# Patient Record
Sex: MALE | Race: WHITE
[De-identification: names, ages, dates, MRNs, and addresses within clinical notes are randomized per-mention and may not be internally consistent; named-entity substitution may affect disease eponyms.]

---

## 2021-01-14 ENCOUNTER — HOSPITAL ENCOUNTER (EMERGENCY)
Dept: HOSPITAL 47 - EC | Age: 41
Discharge: HOME | End: 2021-01-14
Payer: COMMERCIAL

## 2021-01-14 VITALS
RESPIRATION RATE: 22 BRPM | DIASTOLIC BLOOD PRESSURE: 92 MMHG | HEART RATE: 74 BPM | TEMPERATURE: 98.6 F | SYSTOLIC BLOOD PRESSURE: 142 MMHG

## 2021-01-14 DIAGNOSIS — Z88.5: ICD-10-CM

## 2021-01-14 DIAGNOSIS — M25.511: Primary | ICD-10-CM

## 2021-01-14 PROCEDURE — 73030 X-RAY EXAM OF SHOULDER: CPT

## 2021-01-14 PROCEDURE — 99283 EMERGENCY DEPT VISIT LOW MDM: CPT

## 2021-01-14 PROCEDURE — 96372 THER/PROPH/DIAG INJ SC/IM: CPT

## 2021-01-14 NOTE — ED
Upper Extremity HPI





- General


Chief Complaint: Extremity Injury, Upper


Stated Complaint: Rt shoulder pain


Time Seen by Provider: 01/14/21 02:35


Source: patient, family


Mode of arrival: ambulatory


Limitations: no limitations





- History of Present Illness


Initial Comments: 


40 year-old male patient presents to the emergency department for evaluation of 

increased right shoulder pain. Patient states he had a dislocation on 1/3/21 and

has been having pain since. He states that tonight while at work he suddenly 

started having worse pain. States that he has been wearing the sling and not 

using it much. Denies numbness or tingling to the hand. He denies any new 

injury. States that he has not been able to get his pain medications filled due 

to his working hours. Has not been able to follow up with orthopedics because he

is waiting for a referral.  Patient denies any headache, neck pain, back pain, 

chest pain, shortness of breath, dizziness, weakness, abdominal pain, nausea, 

vomiting, or difficulties with bowel movements or urination.





- Related Data


                                Home Medications











 Medication  Instructions  Recorded  Confirmed


 


Acetaminophen Tab [Tylenol Tab] 1,500 mg PO Q6HR PRN 11/25/20 11/25/20


 


Dm/Acetaminophen/Doxylamine [Vicks 30 ml PO Q8H PRN 11/25/20 11/25/20





Nyquil Cold-Flu Liquid]   








                                  Previous Rx's











 Medication  Instructions  Recorded


 


Ondansetron Odt [Zofran Odt] 4 mg PO Q8HR PRN #20 tab 11/25/20











                                    Allergies











Allergy/AdvReac Type Severity Reaction Status Date / Time


 


hydromorphone [From Dilaudid] Allergy  Rash/Hives Verified 01/14/21 02:29


 


meperidine [From Demerol] Allergy  Rash/Hives Verified 01/14/21 02:29














Review of Systems


ROS Statement: 


Those systems with pertinent positive or pertinent negative responses have been 

documented in the HPI.





ROS Other: All systems not noted in ROS Statement are negative.





Past Medical History


Additional Past Medical History / Comment(s): multiple dislocation of rt 

shoulder


History of Any Multi-Drug Resistant Organisms: None Reported


Past Surgical History: Orthopedic Surgery


Additional Past Surgical History / Comment(s): rt shoulder


Past Psychological History: No Psychological Hx Reported


Smoking Status: Never smoker


Past Alcohol Use History: None Reported


Past Drug Use History: None Reported





General Exam


Limitations: no limitations


General appearance: alert, in no apparent distress, other (This is a well-

developed, well-nourished adult male patient in no acute distress.  Vital signs 

upon presentation are temperature 98.6F, pulse 74, respirations 22, blood 

pressure 142/92, pulse ox 97% on room air.)


Respiratory exam: Present: normal lung sounds bilaterally.  Absent: respiratory 

distress, wheezes, rales, rhonchi, stridor


Cardiovascular Exam: Present: regular rate, normal rhythm, normal heart sounds. 

Absent: systolic murmur, diastolic murmur, rubs, gallop, clicks


Extremities exam: Present: normal inspection, tenderness (Right shoulder), 

normal capillary refill, other (Skin to the right arm is pink, warm, dry.  Cap 

refills less than 3 seconds.  Radial pulses 2)).  Absent: full ROM (decreased 

due to pain), pedal edema, joint swelling, calf tenderness


Neurological exam: Present: alert, oriented X3, CN II-XII intact


Psychiatric exam: Present: normal affect, normal mood


Skin exam: Present: warm, dry, intact, normal color.  Absent: rash





Course


                                   Vital Signs











  01/14/21





  02:25


 


Temperature 98.6 F


 


Pulse Rate 74


 


Respiratory 22





Rate 


 


Blood Pressure 142/92


 


O2 Sat by Pulse 97





Oximetry 














Medical Decision Making





- Medical Decision Making


40-year-old male patient presents to the emergency department today for 

evaluation of pain to the right shoulder.  Patient had a dislocation on 01/0

3/2021 and did have successful reduction.  Has not yet been able to follow-up 

with orthopedics due to referral issues also has not picked up his prescription 

from the pharmacy for pain.  Patient states his shoulder started to hurt worse 

tonight.  Physical examination is unremarkable.  He has good neurovascular 

status.  X-rays negative for acute abnormalities.  He will be discharged after 

receiving pain medication in the emergency department.  He is instructed to 

follow-up is as possible with orthopedics.  We did discuss range of motion of 

the shoulder while using the sling to prevent frozen shoulder.  Return 

parameters were discussed in detail.  He verbalizes understanding and agrees 

with this plan.








- Radiology Data


Radiology results: report reviewed, image reviewed


Two-view x-ray of the right shoulder is obtained.  Report was reviewed in its 

entirety.  Impression by Dr. Washington shows no acute osseous abnormalities.





Disposition


Clinical Impression: 


 Shoulder pain, right





Disposition: HOME SELF-CARE


Condition: Good


Instructions (If sedation given, give patient instructions):  Shoulder Pain (ED)


Additional Instructions: 


Take medications as directed.  Follow-up with your primary care physician and 

orthopedic specialist for further evaluation as soon as possible.  Return to the

emergency department for any new, worsening, or concerning symptoms.


Is patient prescribed a controlled substance at d/c from ED?: No


Referrals: 


Tyra Marvin MD [Primary Care Provider] - 1-2 days


Time of Disposition: 03:23

## 2021-01-14 NOTE — XR
EXAM:

  XR Right Shoulder Complete, 2 or More Views

 

CLINICAL HISTORY:

  ITS.REASON XR Reason: Right shoulder pain

 

TECHNIQUE:

  Two or more views of the right shoulder.

 

COMPARISON:

  

1/3/2021

 

FINDINGS:

  Bones/joints:  No acute fracture.  No dislocation.  Arthroplasty 

hardware.

  Soft tissues:  Unremarkable.

 

IMPRESSION:     

  No acute osseous abnormalities.

## 2021-04-15 ENCOUNTER — HOSPITAL ENCOUNTER (OUTPATIENT)
Dept: HOSPITAL 47 - RADNMMAIN | Age: 41
Discharge: HOME | End: 2021-04-15
Attending: FAMILY MEDICINE
Payer: COMMERCIAL

## 2021-04-15 DIAGNOSIS — R07.9: Primary | ICD-10-CM

## 2021-04-15 PROCEDURE — 93351 STRESS TTE COMPLETE: CPT

## 2021-04-15 NOTE — ECHOS
STRESS ECHOCARDIOGRAM



LUMASON:

N/A Vial



INDICATIONS:

Chest pain.



MEDICATIONS:



BASELINE HEART RATE:

89



BASELINE BLOOD PRESSURE:

130/73



MAXIMUM HEART RATE:

154



MAXIMUM BLOOD PRESSURE:

175/98



85% MPHR:

153



100% MPHR:

180



METS:

10



MAXIMUM STAGE REACHED:

IV



TOTAL EXERCISE TIME:

9 minutes



CLINICAL INFORMATION:

Baseline EKG shows sinus rhythm, normal axis, normal intervals.  Patient exercised on

Sarabjit protocol for a total of 9 minutes achieving 10 METs, 85% of predicted maximal

heart rate without chest pain or diagnostic ST-segment depression.



Baseline echo shows normal left ventricular size, wall motion and systolic function.

Postexercise, there is normal hyperdynamic response of all segments of myocardium

noted.



CONCLUSIONS:

1. Good exercise tolerance.

2. Negative stress test by EKG criteria.

3. Negative stress echo.





MMODL / IJN: [id number] / Job#: [id number]

## 2021-04-27 ENCOUNTER — HOSPITAL ENCOUNTER (OUTPATIENT)
Dept: HOSPITAL 47 - RADUSWWP | Age: 41
Discharge: HOME | End: 2021-04-27
Attending: FAMILY MEDICINE
Payer: COMMERCIAL

## 2021-04-27 DIAGNOSIS — K80.20: Primary | ICD-10-CM

## 2021-04-27 PROCEDURE — 76700 US EXAM ABDOM COMPLETE: CPT

## 2021-04-27 NOTE — US
EXAMINATION TYPE: US abdomen complete

 

DATE OF EXAM: 4/27/2021

 

COMPARISON: NONE

 

CLINICAL HISTORY: Hepatic colic w/o cholangitis K80.50. RUQ pain, nausea and bloating x 1 month

 

EXAM MEASUREMENTS:

 

Liver Length:  14.7 cm   

Gallbladder Wall:  0.3 cm   

CBD:  0.3 cm

Spleen:  11.1 cm   

Right Kidney:  9.9 x 6.1 x 4.8 cm 

Left Kidney:  10.6 x 5.7 x 5.6 cm   

 

 

 

Pancreas:  obscured by overlying midline bowel gas

Liver:  limited visualization, scanned intercostally  

Gallbladder:  1.8cm stone

**Evidence for sonographic Pa's sign:  no

CBD:  visualized portions wnl 

Spleen:  wnl   

Right Kidney:  wnl   

Left Kidney:  wnl   

Upper IVC:  limited visualization  

Abd Aorta:  prox portion obscured by overlying midline bowel gas, mid and distal appear wnl

 

 

 

The visualized liver is heterogeneously hyperechoic. Evaluation for focal masses suboptimal due to th
e heterogeneity.  The intrahepatic portion of the IVC and visualized mid and distal abdominal aorta a
re within normal limits.  There is large shadowing 1.8 cm calculus.  No pericholecystic fluid or abno
rmal gallbladder wall thickening. Common bile duct is within normal limits. Suboptimal evaluation of 
pancreas on initial images saved due to shadowing from overlying bowel gas.  The spleen is unremarkab
le.  Kidneys are symmetric and free of hydronephrosis.  No renal lesions are seen on images saved.

 

IMPRESSION: Large gallstone without secondary ultrasound evidence for acute cholecystitis. Heterogene
ous hyperechoic appearance of liver is likely on basis of diffuse fatty infiltration.

## 2021-05-04 ENCOUNTER — HOSPITAL ENCOUNTER (OUTPATIENT)
Dept: HOSPITAL 47 - RADNMMAIN | Age: 41
Discharge: HOME | End: 2021-05-04
Attending: FAMILY MEDICINE
Payer: COMMERCIAL

## 2021-05-04 DIAGNOSIS — K80.30: Primary | ICD-10-CM

## 2021-05-04 PROCEDURE — 78226 HEPATOBILIARY SYSTEM IMAGING: CPT

## 2021-05-04 NOTE — NM
EXAMINATION TYPE: NM hepatobiliary w EF

 

DATE OF EXAM: 5/4/2021

 

COMPARISON: Abdominal ultrasound 4/27/2021

 

HISTORY: Hepatic colic with cholangitis

 

TECHNIQUE: After the intravenous administration of 4.86 mCi Tc 99m Mebrofenin hepatobiliary scintigra
phy is performed.  Immediate images post injection.

 

FINDINGS: 

There is satisfactory initial accumulation of tracer by the liver.  The gallbladder is visualized wit
hin 6 minutes.  The small bowel activity is noted within extending minutes.  At one hour 8 ounces of 
oral ensure plus is given to mimic CCK and gallbladder ejection fraction is calculated at 75 %, in th
e normal range.  Therefore there is no scintigraphic evidence of cystic or common bile duct obstructi
on, no acute or chronic cholecystitis, and no evidence of biliary dyskinesia. 

 

IMPRESSION: Exam is within normal limits.

## 2021-05-18 ENCOUNTER — HOSPITAL ENCOUNTER (OUTPATIENT)
Dept: HOSPITAL 47 - LABPAT | Age: 41
Discharge: HOME | End: 2021-05-18
Attending: SURGERY
Payer: COMMERCIAL

## 2021-05-18 DIAGNOSIS — Z11.52: ICD-10-CM

## 2021-05-18 DIAGNOSIS — Z01.818: Primary | ICD-10-CM

## 2021-05-18 PROCEDURE — 93005 ELECTROCARDIOGRAM TRACING: CPT

## 2021-05-20 ENCOUNTER — HOSPITAL ENCOUNTER (EMERGENCY)
Dept: HOSPITAL 47 - EC | Age: 41
Discharge: HOME | End: 2021-05-20
Payer: COMMERCIAL

## 2021-05-20 VITALS — SYSTOLIC BLOOD PRESSURE: 116 MMHG | DIASTOLIC BLOOD PRESSURE: 87 MMHG | HEART RATE: 78 BPM

## 2021-05-20 VITALS — TEMPERATURE: 97.7 F | RESPIRATION RATE: 18 BRPM

## 2021-05-20 DIAGNOSIS — K80.20: Primary | ICD-10-CM

## 2021-05-20 DIAGNOSIS — Z88.5: ICD-10-CM

## 2021-05-20 LAB
ALBUMIN SERPL-MCNC: 4.4 G/DL (ref 3.5–5)
ALP SERPL-CCNC: 58 U/L (ref 38–126)
ALT SERPL-CCNC: 34 U/L (ref 4–49)
ANION GAP SERPL CALC-SCNC: 9 MMOL/L
AST SERPL-CCNC: 23 U/L (ref 17–59)
BASOPHILS # BLD AUTO: 0.1 K/UL (ref 0–0.2)
BASOPHILS NFR BLD AUTO: 1 %
BUN SERPL-SCNC: 14 MG/DL (ref 9–20)
CALCIUM SPEC-MCNC: 10 MG/DL (ref 8.4–10.2)
CHLORIDE SERPL-SCNC: 105 MMOL/L (ref 98–107)
CO2 SERPL-SCNC: 26 MMOL/L (ref 22–30)
EOSINOPHIL # BLD AUTO: 0.2 K/UL (ref 0–0.7)
EOSINOPHIL NFR BLD AUTO: 2 %
ERYTHROCYTE [DISTWIDTH] IN BLOOD BY AUTOMATED COUNT: 5.06 M/UL (ref 4.3–5.9)
ERYTHROCYTE [DISTWIDTH] IN BLOOD: 12.7 % (ref 11.5–15.5)
GLUCOSE SERPL-MCNC: 86 MG/DL (ref 74–99)
HCT VFR BLD AUTO: 45.5 % (ref 39–53)
HGB BLD-MCNC: 15.8 GM/DL (ref 13–17.5)
LIPASE SERPL-CCNC: 227 U/L (ref 23–300)
LYMPHOCYTES # SPEC AUTO: 2.4 K/UL (ref 1–4.8)
LYMPHOCYTES NFR SPEC AUTO: 27 %
MCH RBC QN AUTO: 31.2 PG (ref 25–35)
MCHC RBC AUTO-ENTMCNC: 34.6 G/DL (ref 31–37)
MCV RBC AUTO: 90 FL (ref 80–100)
MONOCYTES # BLD AUTO: 0.5 K/UL (ref 0–1)
MONOCYTES NFR BLD AUTO: 6 %
NEUTROPHILS # BLD AUTO: 5.4 K/UL (ref 1.3–7.7)
NEUTROPHILS NFR BLD AUTO: 62 %
PLATELET # BLD AUTO: 256 K/UL (ref 150–450)
POTASSIUM SERPL-SCNC: 4.7 MMOL/L (ref 3.5–5.1)
PROT SERPL-MCNC: 7.3 G/DL (ref 6.3–8.2)
SODIUM SERPL-SCNC: 140 MMOL/L (ref 137–145)
WBC # BLD AUTO: 8.7 K/UL (ref 3.8–10.6)

## 2021-05-20 PROCEDURE — 93005 ELECTROCARDIOGRAM TRACING: CPT

## 2021-05-20 PROCEDURE — 36415 COLL VENOUS BLD VENIPUNCTURE: CPT

## 2021-05-20 PROCEDURE — 80053 COMPREHEN METABOLIC PANEL: CPT

## 2021-05-20 PROCEDURE — 96374 THER/PROPH/DIAG INJ IV PUSH: CPT

## 2021-05-20 PROCEDURE — 85025 COMPLETE CBC W/AUTO DIFF WBC: CPT

## 2021-05-20 PROCEDURE — 99284 EMERGENCY DEPT VISIT MOD MDM: CPT

## 2021-05-20 PROCEDURE — 83690 ASSAY OF LIPASE: CPT

## 2021-05-20 NOTE — ED
Medical Decision Making





- Lab Data


Result diagrams: 


                                 05/20/21 13:29





                                 05/20/21 13:29





                                   Lab Results











  05/20/21 05/20/21 Range/Units





  13:29 13:29 


 


WBC  8.7   (3.8-10.6)  k/uL


 


RBC  5.06   (4.30-5.90)  m/uL


 


Hgb  15.8   (13.0-17.5)  gm/dL


 


Hct  45.5   (39.0-53.0)  %


 


MCV  90.0   (80.0-100.0)  fL


 


MCH  31.2   (25.0-35.0)  pg


 


MCHC  34.6   (31.0-37.0)  g/dL


 


RDW  12.7   (11.5-15.5)  %


 


Plt Count  256   (150-450)  k/uL


 


MPV  7.4   


 


Neutrophils %  62   %


 


Lymphocytes %  27   %


 


Monocytes %  6   %


 


Eosinophils %  2   %


 


Basophils %  1   %


 


Neutrophils #  5.4   (1.3-7.7)  k/uL


 


Lymphocytes #  2.4   (1.0-4.8)  k/uL


 


Monocytes #  0.5   (0-1.0)  k/uL


 


Eosinophils #  0.2   (0-0.7)  k/uL


 


Basophils #  0.1   (0-0.2)  k/uL


 


Sodium   140  (137-145)  mmol/L


 


Potassium   4.7  (3.5-5.1)  mmol/L


 


Chloride   105  ()  mmol/L


 


Carbon Dioxide   26  (22-30)  mmol/L


 


Anion Gap   9  mmol/L


 


BUN   14  (9-20)  mg/dL


 


Creatinine   0.80  (0.66-1.25)  mg/dL


 


Est GFR (CKD-EPI)AfAm   >90  (>60 ml/min/1.73 sqM)  


 


Est GFR (CKD-EPI)NonAf   >90  (>60 ml/min/1.73 sqM)  


 


Glucose   86  (74-99)  mg/dL


 


Calcium   10.0  (8.4-10.2)  mg/dL


 


Total Bilirubin   0.2  (0.2-1.3)  mg/dL


 


AST   23  (17-59)  U/L


 


ALT   34  (4-49)  U/L


 


Alkaline Phosphatase   58  ()  U/L


 


Total Protein   7.3  (6.3-8.2)  g/dL


 


Albumin   4.4  (3.5-5.0)  g/dL


 


Lipase   227  ()  U/L














Disposition


Clinical Impression: 


 Cholelithiasis





Disposition: HOME SELF-CARE


Condition: Good


Instructions (If sedation given, give patient instructions):  Gallstones (ED)


Is patient prescribed a controlled substance at d/c from ED?: No


Referrals: 


Tyra Marvin MD [Primary Care Provider] - 1-2 days

## 2021-05-20 NOTE — ED
General Adult HPI





- General


Chief complaint: Abdominal Pain


Stated complaint: Chest Pain, Abd Pain


Time Seen by Provider: 05/20/21 13:14


Source: patient


Mode of arrival: ambulatory


Limitations: no limitations





- History of Present Illness


Initial comments: 


Dictation was produced using dragon dictation software. please excuse any 

grammatical, word or spelling errors. 





This patient was cared for during a federal and state declared state of emergenc

y secondary to Covid 19





Chief Complaint: 40-year-old male with past medical history of gallstone 

presents with gallstone pain.





History of Present Illness: It is a 40-year-old male he was recently diagnosed 

with a large gallstone.  5 days he is scheduled to have a laparoscopic 

cholecystectomy performed by Dr. Jenkins.  Patient for the last 2-3 days has been 

having increasing pain to his right upper quadrant.  His pain rates up into his 

chest.  Call his primary care doctor and PCP told to come to the emergency 

department.  Patient denies any fever.  He had some eggs prior to the onset of 

his symptoms.  Denies any chills or any other constitutional symptoms.








The ROS documented in this emergency department record has been reviewed and 

confirmed by me.  Those systems with pertinent positive or negative responses 

have been documented in the HPI.  All other systems are other negative and/or 

noncontributory.








PHYSICAL EXAM:


General Impression: Alert and oriented x3, not in acute distress


HEENT: Normocephalic atraumatic, extra-ocular movements intact, pupils equal and

reactive to light bilaterally, mucous membranes moist.


Cardiovascular: Heart regular rate and rhythm


Chest: Able to complete full sentences, no retractions, no tachypnea


Abdomen: abdomen soft, non-tender, non-distended, no organomegaly, negative 

Pa sign


Musculoskeletal: Pulses present and equal in all extremities, no peripheral 

edema


Motor:  no focal deficits noted


Neurological: CN II-XII grossly intact, no focal motor or sensory deficits noted


Skin: Intact with no visualized rashes


Psych: Normal affect and mood





ED course: 40-year-old male presents to emergency department for symptomatic 

cholelithiasis.  Vital signs upon arrival are within acceptable limits.


Laboratory evaluation obtained showing no acute processes.  No concern for acute

cholecystitis or choledocholithiasis..  Patient appears improved.  Patient will 

be discharged.  Clinical presentation consistent with symptomatic 

cholelithiasis.








- Related Data


                                Home Medications











 Medication  Instructions  Recorded  Confirmed


 


Acetaminophen Tab [Tylenol Tab] 1,500 mg PO Q6HR PRN 11/25/20 11/25/20


 


Dm/Acetaminophen/Doxylamine [Vicks 30 ml PO Q8H PRN 11/25/20 11/25/20





Nyquil Cold-Flu Liquid]   








                                  Previous Rx's











 Medication  Instructions  Recorded


 


Ondansetron Odt [Zofran Odt] 4 mg PO Q8HR PRN #20 tab 11/25/20











                                    Allergies











Allergy/AdvReac Type Severity Reaction Status Date / Time


 


hydromorphone [From Dilaudid] Allergy  Rash/Hives Verified 05/20/21 12:03


 


meperidine [From Demerol] Allergy  Rash/Hives Verified 05/20/21 12:03














Review of Systems


ROS Statement: 


Those systems with pertinent positive or pertinent negative responses have been 

documented in the HPI.





ROS Other: All systems not noted in ROS Statement are negative.





Past Medical History


Additional Past Medical History / Comment(s): multiple dislocation of rt tamia

ulder


History of Any Multi-Drug Resistant Organisms: None Reported


Past Surgical History: Orthopedic Surgery


Additional Past Surgical History / Comment(s): rt shoulder


Past Psychological History: No Psychological Hx Reported


Smoking Status: Never smoker


Past Alcohol Use History: None Reported


Past Drug Use History: None Reported





General Exam


Limitations: no limitations





Course


                                   Vital Signs











  05/20/21





  12:00


 


Temperature 97.7 F


 


Pulse Rate 92


 


Respiratory 18





Rate 


 


Blood Pressure 137/100


 


O2 Sat by Pulse 95





Oximetry 














Medical Decision Making





- Lab Data


Result diagrams: 


                                 05/20/21 13:29





                                 05/20/21 13:29


                                   Lab Results











  05/20/21 05/20/21 Range/Units





  13:29 13:29 


 


WBC  8.7   (3.8-10.6)  k/uL


 


RBC  5.06   (4.30-5.90)  m/uL


 


Hgb  15.8   (13.0-17.5)  gm/dL


 


Hct  45.5   (39.0-53.0)  %


 


MCV  90.0   (80.0-100.0)  fL


 


MCH  31.2   (25.0-35.0)  pg


 


MCHC  34.6   (31.0-37.0)  g/dL


 


RDW  12.7   (11.5-15.5)  %


 


Plt Count  256   (150-450)  k/uL


 


MPV  7.4   


 


Neutrophils %  62   %


 


Lymphocytes %  27   %


 


Monocytes %  6   %


 


Eosinophils %  2   %


 


Basophils %  1   %


 


Neutrophils #  5.4   (1.3-7.7)  k/uL


 


Lymphocytes #  2.4   (1.0-4.8)  k/uL


 


Monocytes #  0.5   (0-1.0)  k/uL


 


Eosinophils #  0.2   (0-0.7)  k/uL


 


Basophils #  0.1   (0-0.2)  k/uL


 


Sodium   140  (137-145)  mmol/L


 


Potassium   4.7  (3.5-5.1)  mmol/L


 


Chloride   105  ()  mmol/L


 


Carbon Dioxide   26  (22-30)  mmol/L


 


Anion Gap   9  mmol/L


 


BUN   14  (9-20)  mg/dL


 


Creatinine   0.80  (0.66-1.25)  mg/dL


 


Est GFR (CKD-EPI)AfAm   >90  (>60 ml/min/1.73 sqM)  


 


Est GFR (CKD-EPI)NonAf   >90  (>60 ml/min/1.73 sqM)  


 


Glucose   86  (74-99)  mg/dL


 


Calcium   10.0  (8.4-10.2)  mg/dL


 


Total Bilirubin   0.2  (0.2-1.3)  mg/dL


 


AST   23  (17-59)  U/L


 


ALT   34  (4-49)  U/L


 


Alkaline Phosphatase   58  ()  U/L


 


Total Protein   7.3  (6.3-8.2)  g/dL


 


Albumin   4.4  (3.5-5.0)  g/dL


 


Lipase   227  ()  U/L














Disposition


Clinical Impression: 


 Cholelithiasis





Disposition: HOME SELF-CARE


Condition: Good


Is patient prescribed a controlled substance at d/c from ED?: No


Referrals: 


Tyra Marvin MD [Primary Care Provider] - 1-2 days


Time of Disposition: 14:28

## 2021-05-25 ENCOUNTER — HOSPITAL ENCOUNTER (OUTPATIENT)
Dept: HOSPITAL 47 - OR | Age: 41
Discharge: HOME | End: 2021-05-25
Attending: SURGERY
Payer: COMMERCIAL

## 2021-05-25 VITALS — RESPIRATION RATE: 16 BRPM

## 2021-05-25 VITALS — HEART RATE: 72 BPM | DIASTOLIC BLOOD PRESSURE: 79 MMHG | SYSTOLIC BLOOD PRESSURE: 136 MMHG

## 2021-05-25 VITALS — BODY MASS INDEX: 36.5 KG/M2

## 2021-05-25 VITALS — TEMPERATURE: 97.3 F

## 2021-05-25 DIAGNOSIS — K80.10: Primary | ICD-10-CM

## 2021-05-25 DIAGNOSIS — Z88.5: ICD-10-CM

## 2021-05-25 DIAGNOSIS — Z91.040: ICD-10-CM

## 2021-05-25 DIAGNOSIS — E78.00: ICD-10-CM

## 2021-05-25 PROCEDURE — 47562 LAPAROSCOPIC CHOLECYSTECTOMY: CPT

## 2021-05-25 PROCEDURE — 88304 TISSUE EXAM BY PATHOLOGIST: CPT

## 2021-05-25 RX ADMIN — ONDANSETRON ONE MG: 2 INJECTION INTRAMUSCULAR; INTRAVENOUS at 11:59

## 2021-05-25 RX ADMIN — FENTANYL CITRATE ONE MCG: 50 INJECTION, SOLUTION INTRAMUSCULAR; INTRAVENOUS at 14:17

## 2021-05-25 RX ADMIN — FENTANYL CITRATE ONE MCG: 50 INJECTION, SOLUTION INTRAMUSCULAR; INTRAVENOUS at 13:51

## 2021-05-25 RX ADMIN — FENTANYL CITRATE ONE MCG: 50 INJECTION, SOLUTION INTRAMUSCULAR; INTRAVENOUS at 14:10

## 2021-05-25 RX ADMIN — FENTANYL CITRATE ONE MCG: 50 INJECTION, SOLUTION INTRAMUSCULAR; INTRAVENOUS at 14:01

## 2021-05-25 RX ADMIN — ONDANSETRON ONE MG: 2 INJECTION INTRAMUSCULAR; INTRAVENOUS at 13:54

## 2021-05-25 NOTE — P.OP
Date of Procedure: 05/25/21


Preoperative Diagnosis: 


Symptomatic cholelithiasis


Postoperative Diagnosis: 


Symptomatic cholelithiasis


Procedure(s) Performed: 


Robotic cholecystectomy


Anesthesia: MEGAN


Surgeon: Elvia Jenkins


Pathology: other (Gallbladder and contents)


Condition: stable


Disposition: same day


Indications for Procedure: 


41-year-old male presented to the surgery clinic with complaints of right upper 

quadrant pain, on workup he was found to have cholelithiasis.  Based on clinical

picture, he was diagnosed with symptomatically cholelithiasis.  He has opted for

robotic cholecystectomy.  Risks, benefits and alternatives were provided to the 

patient.  He did provide consent prior to attending the operating suite.


Operative Findings: 


Cholelithiasis


Description of Procedure: 


The patient is brought into the operating suite and placed in supine position on

the operating table.  Sedation was provided by anesthesia and the patient 

underwent endotracheal intubation.  The patient was then prepped and draped in 

regular sterile fashion.  An infra umbilical incision was made dissection was 

carried to the fascia.  The fascia was incised in 8 mm trocar was placed.  

Pneumoperitoneum was achieved.  Once pneumoperitoneum was achieved, the patient 

was placed in appropriate position of reverse Trendelenburg and rotated to the 

left.  3 additional 8 mm trochars were placed.  2 were placed in the right lower

quadrant and one was placed in the left upper quadrant.  The robot was then 

docked.  The gallbladder was grasped and elevated and dissection was carried 

along the infundibulum, revealing both the cystic duct and cystic artery.  Both 

the cystic duct and cystic artery were skeletonized.  The cystic duct was 

clearly visualized using ICG technology.  2 clips were placed proximally and the

cystic duct and one was placed distally and the cystic duct was ligated.  2 cl

ips were placed proximally on the artery and 2 clips were placed distally and 

the cystic artery was ligated.  Hemostasis was noted to be maintained and 

cautery was used to dissect the gallbladder from the gallbladder fossa.  The 

gallbladder was then placed in an Endo Catch bag and removed from the abdomen.  

Copious muss of suction and irrigation were used in the right upper quadrant.  

Hemostasis was noted to be maintained.  At this point, the infraumbilical 

incision site fascia was closed using 0 Vicryl suture under direct visualization

using a Emilio-Jason device.  Pneumoperitoneum was then released and all 

ports removed from the abdomen.  All skin incisions were closed with 4-0 Vicryl 

subcuticular suture.  The patient was awakened in the operating suite and taken 

to postanesthesia care unit in stable condition.

## 2021-07-29 ENCOUNTER — HOSPITAL ENCOUNTER (EMERGENCY)
Dept: HOSPITAL 47 - EC | Age: 41
Discharge: HOME | End: 2021-07-29
Payer: COMMERCIAL

## 2021-07-29 VITALS — HEART RATE: 75 BPM | TEMPERATURE: 97.9 F

## 2021-07-29 VITALS — SYSTOLIC BLOOD PRESSURE: 124 MMHG | RESPIRATION RATE: 16 BRPM | DIASTOLIC BLOOD PRESSURE: 88 MMHG
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
## 2021-07-29 DIAGNOSIS — S39.012A: Primary | ICD-10-CM
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
## 2021-07-29 LAB — GLUCOSE BLD-MCNC: 97 MG/DL (ref 75–99)

## 2021-07-29 PROCEDURE — 72100 X-RAY EXAM L-S SPINE 2/3 VWS: CPT

## 2021-07-29 PROCEDURE — 96372 THER/PROPH/DIAG INJ SC/IM: CPT

## 2021-07-29 PROCEDURE — 36415 COLL VENOUS BLD VENIPUNCTURE: CPT

## 2021-07-29 PROCEDURE — 99283 EMERGENCY DEPT VISIT LOW MDM: CPT

## 2021-07-29 NOTE — XR
EXAMINATION TYPE: XR lumbar spine 2 or 3V

 

DATE OF EXAM: 7/29/2021

 

COMPARISON: NONE

 

HISTORY: Low back pain

 

TECHNIQUE: 3 views

 

FINDINGS: Lumbar vertebra have normal alignment. There is posterior fusion surgery at L5-S1 with disc
 prosthesis. There is no compression fracture. Sacroiliac joints are intact. I see no bony destructiv
e process.

 

IMPRESSION: Previous surgery. No acute abnormality of the lumbar spine. No fracture.

## 2021-07-29 NOTE — ED
Back Pain HPI





- General


Chief Complaint: Back Pain/Injury


Stated Complaint: Lower Back Pain


Time Seen by Provider: 07/29/21 04:46


Source: patient, RN notes reviewed, old records reviewed


Limitations: no limitations





- History of Present Illness


Initial Comments: 





41 male to the ER for evaluation of recurrent back injury.  Patient has some 

pain down his right leg.  History of back surgery and fusion.  Injury occurred 

at work with heavy lifting.  Patient is without fevers, bleeding he may be 

urinating more than normal.  No modifying factors for symptoms at home no loss 

of bowel or bladder.  Patient is able to amply without difficulty


MD Complaint: back pain, back injury


-: hour(s)


Similar Symptoms Previously: Yes


Place: home


Radiation: none


Severity: severe


Severity scale (1-10): 8


Quality: sharp


Consistency: constant


Improves With: none


Worsens With: none


Context: while lifting, turning/twisting


Associated Symptoms: denies other symptoms





- Related Data


                                  Previous Rx's











 Medication  Instructions  Recorded


 


HYDROcodone/APAP 5-325MG [Norco 1 tab PO Q6HR PRN 3 Days #12 tab 05/25/21





5-325]  


 


Ibuprofen [Motrin] 600 mg PO Q8HR PRN #24 tab 05/25/21











                                    Allergies











Allergy/AdvReac Type Severity Reaction Status Date / Time


 


hydromorphone [From Dilaudid] Allergy  Rash/Hives Verified 07/29/21 04:51


 


latex Allergy  Rash/Hives Verified 07/29/21 04:51


 


meperidine [From Demerol] Allergy  Rash/Hives Verified 07/29/21 04:51














Review of Systems


ROS Statement: 


Those systems with pertinent positive or pertinent negative responses have been 

documented in the HPI.





ROS Other: All systems not noted in ROS Statement are negative.





Past Medical History


Additional Past Medical History / Comment(s): multiple dislocation of rt 

shoulder, back pain


History of Any Multi-Drug Resistant Organisms: None Reported


Past Surgical History: Back Surgery


Additional Past Surgical History / Comment(s): rt shoulder


Past Psychological History: No Psychological Hx Reported


Smoking Status: Never smoker


Past Alcohol Use History: None Reported


Past Drug Use History: None Reported





General Exam


Limitations: no limitations


General appearance: alert, in no apparent distress


Head exam: Present: atraumatic, normocephalic, normal inspection


Eye exam: Present: normal appearance, PERRL, EOMI.  Absent: scleral icterus, 

conjunctival injection, periorbital swelling


ENT exam: Present: normal exam, mucous membranes moist


Neck exam: Present: normal inspection.  Absent: tenderness, meningismus, 

lymphadenopathy


Respiratory exam: Present: normal lung sounds bilaterally.  Absent: respiratory 

distress, wheezes, rales, rhonchi, stridor


Cardiovascular Exam: Present: regular rate, normal rhythm, normal heart sounds. 

Absent: systolic murmur, diastolic murmur, rubs, gallop, clicks


GI/Abdominal exam: Present: soft, normal bowel sounds.  Absent: distended, 

tenderness, guarding, rebound, rigid


Extremities exam: Present: normal inspection, full ROM, normal capillary refill.

 Absent: tenderness, pedal edema, joint swelling, calf tenderness


Back exam: Present: normal inspection


Neurological exam: Present: alert, oriented X3, CN II-XII intact


Psychiatric exam: Present: normal affect, normal mood


Skin exam: Present: warm, dry, intact, normal color.  Absent: rash





Course


                                   Vital Signs











  07/29/21





  04:46


 


Temperature 97.9 F


 


Pulse Rate 75


 


Respiratory 20





Rate 


 


Blood Pressure 151/91


 


O2 Sat by Pulse 100





Oximetry 














- Reevaluation(s)


Reevaluation #1: 





07/29/21 05:59


Medical record is reviewed


Reevaluation #2: 





07/29/21 05:59


Patient's back pain is improved


Reevaluation #3: 





07/29/21 06:00


Patient is informed of results and questions have been answered





Medical Decision Making





- Medical Decision Making





41 male to the ER  for evaluation of acute on chronic back pain.  Back pain 

improved currently, Motrin and Tylenol pain at home when he can be discharged 

home





- Lab Data


                                   Lab Results











  07/29/21 Range/Units





  05:36 


 


POC Glucose (mg/dL)  97  (75-99)  mg/dL


 


POC Glu Operater ID  Lexy Zabala  














- Radiology Data


Radiology results: report reviewed (X-ray lumbar spine is negative for traumatic

injury, prior surgery looks in place), image reviewed





Disposition


Clinical Impression: 


 Mechanical back pain, Strain of lumbar region, Sciatica, Lumbar radiculopathy





Disposition: HOME SELF-CARE


Condition: Good


Instructions (If sedation given, give patient instructions):  Acute Low Back 

Pain (ED)


Is patient prescribed a controlled substance at d/c from ED?: No


Referrals: 


Tyra Marvin MD [Primary Care Provider] - 1-2 days


VANE Julien DO [Doctor of Osteopathic Medicine] - 1-2 days

## 2021-08-01 ENCOUNTER — HOSPITAL ENCOUNTER (EMERGENCY)
Dept: HOSPITAL 47 - EC | Age: 41
Discharge: HOME | End: 2021-08-01
Payer: COMMERCIAL

## 2021-08-01 VITALS — DIASTOLIC BLOOD PRESSURE: 78 MMHG | SYSTOLIC BLOOD PRESSURE: 134 MMHG | HEART RATE: 82 BPM | RESPIRATION RATE: 16 BRPM

## 2021-08-01 VITALS — TEMPERATURE: 97.8 F
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
## 2021-08-01 DIAGNOSIS — S39.012A: Primary | ICD-10-CM

## 2021-08-01 PROCEDURE — 99283 EMERGENCY DEPT VISIT LOW MDM: CPT

## 2021-08-01 PROCEDURE — 93005 ELECTROCARDIOGRAM TRACING: CPT

## 2021-08-01 PROCEDURE — 72131 CT LUMBAR SPINE W/O DYE: CPT

## 2021-08-01 NOTE — ED
Back Pain HPI





- General


Chief Complaint: Back Pain/Injury


Stated Complaint: Revisit Back Pain, Diarrhea


Time Seen by Provider: 08/01/21 14:20


Source: patient


Limitations: no limitations





- History of Present Illness


Initial Comments: 





41-year-old male presents to the emergency department with a chief complaint of 

back pain.  He reports history of chronic back pain with fusion of L5 and S1.  

States he was here several days ago with the same back pain which she believes 

is secondary to strain while he is at work.  States the pain is located in the 

right lumbosacral region with radiation along the posterior aspect of her right 

lower extremity.  States his symptoms improved while he was here but then they 

began to return.  States is also developed one episode of diarrhea but no nausea

or vomiting or abdominal pain.  He denies any fevers or chills.  Denies any 

saddle anesthesia, urinary retention with overflow or bowel incontinence.





- Related Data


                                Home Medications











 Medication  Instructions  Recorded  Confirmed


 


Cyclobenzaprine [Flexeril] 10 mg PO BID PRN 08/01/21 08/01/21


 


dexAMETHasone [Dexamethasone] 4 mg PO BID 08/01/21 08/01/21











                                    Allergies











Allergy/AdvReac Type Severity Reaction Status Date / Time


 


hydromorphone [From Dilaudid] Allergy  Rash/Hives Verified 08/01/21 16:12


 


latex Allergy  Rash/Hives Verified 08/01/21 16:12


 


meperidine [From Demerol] Allergy  Rash/Hives Verified 08/01/21 16:12














Review of Systems


ROS Statement: 


Those systems with pertinent positive or pertinent negative responses have been 

documented in the HPI.





ROS Other: All systems not noted in ROS Statement are negative.





Past Medical History


Additional Past Medical History / Comment(s): multiple dislocation of rt 

shoulder, back pain


History of Any Multi-Drug Resistant Organisms: None Reported


Past Surgical History: Back Surgery


Additional Past Surgical History / Comment(s): rt shoulder


Past Psychological History: No Psychological Hx Reported


Smoking Status: Never smoker


Past Alcohol Use History: None Reported


Past Drug Use History: None Reported





General Exam


Limitations: no limitations


General appearance: alert, in no apparent distress


Head exam: Present: atraumatic, normocephalic, normal inspection


Eye exam: Present: normal appearance, PERRL, EOMI


Pupils: Present: normal accommodation


ENT exam: Present: normal exam, normal oropharynx, mucous membranes moist


Neck exam: Present: normal inspection, full ROM.  Absent: tenderness


Respiratory exam: Present: normal lung sounds bilaterally.  Absent: respiratory 

distress, wheezes, rales, rhonchi, stridor, chest wall tenderness, accessory 

muscle use


Cardiovascular Exam: Present: regular rate, normal rhythm, normal heart sounds. 

Absent: systolic murmur


Extremities exam: Present: normal inspection, full ROM, normal capillary refill.

 Absent: tenderness


Back exam: Present: normal inspection, full ROM, paraspinal tenderness (Right-

sided paraspinal tenderness in the lumbar sacral region.).  Absent: tenderness, 

CVA tenderness (R), CVA tenderness (L), muscle spasm


Neurological exam: Present: alert, oriented X3, normal gait


Psychiatric exam: Present: normal affect, normal mood


Skin exam: Present: warm, dry, intact, normal color





Course


                                   Vital Signs











  08/01/21 08/01/21 08/01/21





  14:13 15:24 16:49


 


Temperature 97.8 F  


 


Pulse Rate 92 83 82


 


Respiratory 16 18 16





Rate   


 


Blood Pressure 148/90 134/84 134/78


 


O2 Sat by Pulse 100 97 98





Oximetry   














Medical Decision Making





- Medical Decision Making





41-year-old male presents to the emergency department with a chief complaint of 

back pain.  On physical examination, right-sided paraspinal tenderness in the 

lumbosacral region with radiating to the right lower leg.  Likely sciatica type 

symptoms.  This is acute on chronic back pain.  No cauda equina.  Patient was 

given analgesia and a Lidoderm patch.  On Reevaluation, he reports improvement 

in symptoms.  CT imaging of the lumbar spine shows no acute findings.  I will 

discharge the patient with Tylenol 3 starter pack.  Advised to follow-up with an

orthopedic specialist.  Return parameters were thoroughly discussed patient is 

understanding and agreeable.  Case discussed with physician.





Disposition


Clinical Impression: 


 Mechanical back pain, Strain of lumbar region





Disposition: HOME SELF-CARE


Condition: Stable


Instructions (If sedation given, give patient instructions):  Acute Low Back 

Pain (ED)


Additional Instructions: 


Follow with orthopedic specialist.  Return to emergency department if symptoms 

worsen.


Is patient prescribed a controlled substance at d/c from ED?: No


Referrals: 


Tyra Marvin MD [Primary Care Provider] - 1-2 days


Rehan Bautista DO [Doctor of Osteopathic Medicine] - 1-2 days


Time of Disposition: 16:32

## 2021-08-01 NOTE — CT
EXAMINATION TYPE: CT lumbar spine wo con

 

DATE OF EXAM: 8/1/2021 3:10 PM

 

COMPARISON: Lumbar spine radiograph 7/29/2021

 

HISTORY: Low back pain, no injury. History of L5-S1 fusion.

 

CT DLP: 1625.6 mGycm

Automated exposure control for dose reduction was used.

 

TECHNIQUE: Unenhanced CT of the lumbar spine was performed.  Bone and soft tissue window settings are
 submitted as well as coronal and sagittal reconstructions. 

 

Postsurgical changes consistent with posterior fusion at L5-S1 with intervertebral disc spacer. Lumba
r vertebral body heights are maintained. Mild multilevel degenerative changes with anterior osteophyt
es and Schmorl's nodes most pronounced in the upper lumbar and lower thoracic spines.

 

L1-L2: Normal disc space height.  No disc herniation protrusion or central stenosis.  No facet joint 
arthropathy.  No evidence for foraminal encroachment.

 

L2-L3: Normal disc space height.  No disc herniation protrusion or central stenosis.  No facet joint 
arthropathy.  No evidence for foraminal encroachment.

 

L3-L4: Normal disc space height.  No disc herniation protrusion or central stenosis.  No facet joint 
arthropathy.  No evidence for foraminal encroachment.

 

L4-L5: Normal disc space height.  Mild disc bulge. No central stenosis.  No facet joint arthropathy. 
 No evidence for foraminal encroachment.

 

L5-S1: Intervertebral disc spacer is present. No disc herniation protrusion or central stenosis.  No 
facet joint arthropathy.  No evidence for foraminal encroachment.

 

IMPRESSION:

1. Posterior fusion L5-S1 with intervertebral disc spacer. Hardware appears intact.

2. No acute fracture or traumatic subluxation.

3. No spinal canal stenosis or neural foraminal narrowing.

## 2021-08-04 ENCOUNTER — HOSPITAL ENCOUNTER (INPATIENT)
Dept: HOSPITAL 47 - EC | Age: 41
LOS: 4 days | Discharge: HOME | DRG: 392 | End: 2021-08-08
Attending: INTERNAL MEDICINE | Admitting: INTERNAL MEDICINE
Payer: COMMERCIAL

## 2021-08-04 DIAGNOSIS — T38.0X5A: ICD-10-CM

## 2021-08-04 DIAGNOSIS — G89.29: ICD-10-CM

## 2021-08-04 DIAGNOSIS — F41.1: ICD-10-CM

## 2021-08-04 DIAGNOSIS — D72.829: ICD-10-CM

## 2021-08-04 DIAGNOSIS — K52.9: Primary | ICD-10-CM

## 2021-08-04 DIAGNOSIS — Z98.1: ICD-10-CM

## 2021-08-04 DIAGNOSIS — K44.9: ICD-10-CM

## 2021-08-04 DIAGNOSIS — M54.40: ICD-10-CM

## 2021-08-04 LAB
ALBUMIN SERPL-MCNC: 4.1 G/DL (ref 3.5–5)
ALP SERPL-CCNC: 59 U/L (ref 38–126)
ALT SERPL-CCNC: 36 U/L (ref 4–49)
ANION GAP SERPL CALC-SCNC: 9 MMOL/L
APTT BLD: 22.7 SEC (ref 22–30)
AST SERPL-CCNC: 30 U/L (ref 17–59)
BASOPHILS # BLD AUTO: 0.1 K/UL (ref 0–0.2)
BASOPHILS NFR BLD AUTO: 0 %
BUN SERPL-SCNC: 23 MG/DL (ref 9–20)
CALCIUM SPEC-MCNC: 9.5 MG/DL (ref 8.4–10.2)
CHLORIDE SERPL-SCNC: 103 MMOL/L (ref 98–107)
CO2 SERPL-SCNC: 22 MMOL/L (ref 22–30)
EOSINOPHIL # BLD AUTO: 0.1 K/UL (ref 0–0.7)
EOSINOPHIL NFR BLD AUTO: 0 %
ERYTHROCYTE [DISTWIDTH] IN BLOOD BY AUTOMATED COUNT: 5.1 M/UL (ref 4.3–5.9)
ERYTHROCYTE [DISTWIDTH] IN BLOOD: 14.2 % (ref 11.5–15.5)
GLUCOSE SERPL-MCNC: 121 MG/DL (ref 74–99)
HCT VFR BLD AUTO: 47.9 % (ref 39–53)
HGB BLD-MCNC: 16 GM/DL (ref 13–17.5)
INR PPP: 1 (ref ?–1.2)
LYMPHOCYTES # SPEC AUTO: 1.9 K/UL (ref 1–4.8)
LYMPHOCYTES NFR SPEC AUTO: 12 %
MAGNESIUM SPEC-SCNC: 2.2 MG/DL (ref 1.6–2.3)
MCH RBC QN AUTO: 31.4 PG (ref 25–35)
MCHC RBC AUTO-ENTMCNC: 33.4 G/DL (ref 31–37)
MCV RBC AUTO: 93.9 FL (ref 80–100)
MONOCYTES # BLD AUTO: 0.6 K/UL (ref 0–1)
MONOCYTES NFR BLD AUTO: 4 %
NEUTROPHILS # BLD AUTO: 12.9 K/UL (ref 1.3–7.7)
NEUTROPHILS NFR BLD AUTO: 83 %
PLATELET # BLD AUTO: 356 K/UL (ref 150–450)
POTASSIUM SERPL-SCNC: 4.8 MMOL/L (ref 3.5–5.1)
PROT SERPL-MCNC: 7 G/DL (ref 6.3–8.2)
PT BLD: 10.6 SEC (ref 9–12)
SODIUM SERPL-SCNC: 134 MMOL/L (ref 137–145)
WBC # BLD AUTO: 15.6 K/UL (ref 3.8–10.6)

## 2021-08-04 PROCEDURE — 84520 ASSAY OF UREA NITROGEN: CPT

## 2021-08-04 PROCEDURE — 36415 COLL VENOUS BLD VENIPUNCTURE: CPT

## 2021-08-04 PROCEDURE — 84484 ASSAY OF TROPONIN QUANT: CPT

## 2021-08-04 PROCEDURE — 71275 CT ANGIOGRAPHY CHEST: CPT

## 2021-08-04 PROCEDURE — 93005 ELECTROCARDIOGRAM TRACING: CPT

## 2021-08-04 PROCEDURE — 82565 ASSAY OF CREATININE: CPT

## 2021-08-04 PROCEDURE — 87046 STOOL CULTR AEROBIC BACT EA: CPT

## 2021-08-04 PROCEDURE — 85027 COMPLETE CBC AUTOMATED: CPT

## 2021-08-04 PROCEDURE — 83735 ASSAY OF MAGNESIUM: CPT

## 2021-08-04 PROCEDURE — 80053 COMPREHEN METABOLIC PANEL: CPT

## 2021-08-04 PROCEDURE — 85610 PROTHROMBIN TIME: CPT

## 2021-08-04 PROCEDURE — 85730 THROMBOPLASTIN TIME PARTIAL: CPT

## 2021-08-04 PROCEDURE — 87324 CLOSTRIDIUM AG IA: CPT

## 2021-08-04 PROCEDURE — 71046 X-RAY EXAM CHEST 2 VIEWS: CPT

## 2021-08-04 PROCEDURE — 85379 FIBRIN DEGRADATION QUANT: CPT

## 2021-08-04 PROCEDURE — 87045 FECES CULTURE AEROBIC BACT: CPT

## 2021-08-04 PROCEDURE — 80061 LIPID PANEL: CPT

## 2021-08-04 PROCEDURE — 85025 COMPLETE CBC W/AUTO DIFF WBC: CPT

## 2021-08-04 PROCEDURE — 81003 URINALYSIS AUTO W/O SCOPE: CPT

## 2021-08-04 PROCEDURE — 74177 CT ABD & PELVIS W/CONTRAST: CPT

## 2021-08-04 RX ADMIN — MORPHINE SULFATE PRN MG: 2 INJECTION, SOLUTION INTRAMUSCULAR; INTRAVENOUS at 22:14

## 2021-08-04 NOTE — ED
General Adult HPI





- General


Chief complaint: Chest Pain


Stated complaint: Chest Pain


Time Seen by Provider: 08/04/21 16:00


Source: patient, RN notes reviewed, old records reviewed


Mode of arrival: wheelchair


Limitations: no limitations





- History of Present Illness


Initial comments: 





This is a 41-year-old male presents emergency Department stating since this 

morning he's been having left-sided chest pain.  Patient states it feels like 

someone sitting on his chest per patient also states he is a little bit short of

breath.  Patient denies any diaphoretic episodes.  Patient states the pain is 

radiating down his left arm..  Patient states she's been dealing with pre-

significant lower back pain on the right side he is diagnosed with sciatica and 

that is also not allowing him to sleep very well but he is here today because of

the chest pain.  Patient states this pain is increased with movement and it is 

difficult to get a comfortable position.  Patient states chest pain however is 

not associated with any movement.  Patient denies being a diabetic and high 

blood pressure or high cholesterol.  Patient denies any smoking history patient 

is a family history of heart disease.  Patient denies abdominal pain patient 

denies nausea vomiting diarrhea.  Patient denies reproducible pain.  Patient 

states he had a job for about a month where he was doing a lot of heavy lifting 

because his back injury they let him go.  Patient denies headache patient denies

numbness weakness.





- Related Data


                                Home Medications











 Medication  Instructions  Recorded  Confirmed


 


Cyclobenzaprine [Flexeril] 10 mg PO BID PRN 08/01/21 08/04/21


 


dexAMETHasone [Dexamethasone] 4 mg PO BID 08/01/21 08/04/21











                                    Allergies











Allergy/AdvReac Type Severity Reaction Status Date / Time


 


hydromorphone [From Dilaudid] Allergy  Rash/Hives Verified 08/04/21 17:53


 


latex Allergy  Rash/Hives Verified 08/04/21 17:53


 


meperidine [From Demerol] Allergy  Rash/Hives Verified 08/04/21 17:53














Review of Systems


ROS Statement: 


Those systems with pertinent positive or pertinent negative responses have been 

documented in the HPI.





ROS Other: All systems not noted in ROS Statement are negative.





Past Medical History


Past Medical History: No Reported History


Additional Past Medical History / Comment(s): multiple dislocation of rt 

shoulder, back pain


History of Any Multi-Drug Resistant Organisms: None Reported


Past Surgical History: Back Surgery, Orthopedic Surgery


Additional Past Surgical History / Comment(s): rt shoulder


Past Psychological History: No Psychological Hx Reported


Smoking Status: Never smoker


Past Alcohol Use History: None Reported


Past Drug Use History: None Reported





General Exam





- General Exam Comments


Initial Comments: 





GENERAL:


Patient is well-developed and well-nourished.  Patient is nontoxic and well-

hydrated and is in mild distress.





ENT:


Neck is soft and supple.  No significant lymphadenopathy is noted.  Oropharynx 

is clear.  Moist mucous membranes.  Neck has full range of motion without 

eliciting any pain.  





EYES:


The sclera were anicteric and conjunctiva were pink and moist.  Extraocular 

movements were intact and pupils were equal round and reactive to light.  

Eyelids were unremarkable.





PULMONARY:


Unlabored respirations.  Good breath sounds bilaterally.  No audible rales 

rhonchi or wheezing was noted.





CARDIOVASCULAR:


There is a regular rate and rhythm without any murmurs gallops or rubs.  





ABDOMEN:


Soft and nontender with normal bowel sounds.  





SKIN:


Skin is clear with no lesions or rashes and otherwise unremarkable.





NEUROLOGIC:


Patient is alert and oriented x3.  Cranial nerves II through XII are grossly 

intact.  Motor and sensory are also intact.  Normal speech, volume and content. 

Symmetrical smile.  





MUSCULOSKELETAL:


Normal extremities with adequate strength and full range of motion.  No lower 

extremity swelling or edema.  No calf tenderness.





LYMPHATICS:


No significant lymphadenopathy is noted





PSYCHIATRIC:


Normal psychiatric evaluation.  


Limitations: no limitations





Course


                                   Vital Signs











  08/04/21 08/04/21





  15:57 18:49


 


Temperature 98.0 F 


 


Pulse Rate 94 86


 


Respiratory 20 16





Rate  


 


Blood Pressure 123/84 120/80


 


O2 Sat by Pulse 96 99





Oximetry  














Medical Decision Making





- Medical Decision Making





EKG shows normal sinus rhythm at 86 bpm UT interval is 172 QRS is 74 QT interval

340 QTC is 406 per patient's EKG shows no ST segment elevation or depression.





Chest x-ray shows no acute abnormality.





I will begin the room to reevaluate the patient he continued to state that he 

was having both lower back pain and chest pain in the chest pain was radiating 

down his left arm.  Patient appeared to be very anxious I gave him an Ativan as 

well as Toradol this point time.





I spoke with Dr. Wheat he agreed to admit the patient admitted the patient 

wrote admitting orders.





- Lab Data


Result diagrams: 


                                 08/04/21 17:14





                                 08/04/21 17:14


                                   Lab Results











  08/04/21 08/04/21 08/04/21 Range/Units





  17:14 17:14 17:14 


 


WBC  15.6 H    (3.8-10.6)  k/uL


 


RBC  5.10    (4.30-5.90)  m/uL


 


Hgb  16.0    (13.0-17.5)  gm/dL


 


Hct  47.9    (39.0-53.0)  %


 


MCV  93.9    (80.0-100.0)  fL


 


MCH  31.4    (25.0-35.0)  pg


 


MCHC  33.4    (31.0-37.0)  g/dL


 


RDW  14.2    (11.5-15.5)  %


 


Plt Count  356    (150-450)  k/uL


 


MPV  7.0    


 


Neutrophils %  83    %


 


Lymphocytes %  12    %


 


Monocytes %  4    %


 


Eosinophils %  0    %


 


Basophils %  0    %


 


Neutrophils #  12.9 H    (1.3-7.7)  k/uL


 


Lymphocytes #  1.9    (1.0-4.8)  k/uL


 


Monocytes #  0.6    (0-1.0)  k/uL


 


Eosinophils #  0.1    (0-0.7)  k/uL


 


Basophils #  0.1    (0-0.2)  k/uL


 


PT   10.6   (9.0-12.0)  sec


 


INR   1.0   (<1.2)  


 


APTT   22.7   (22.0-30.0)  sec


 


Sodium    134 L  (137-145)  mmol/L


 


Potassium    4.8  (3.5-5.1)  mmol/L


 


Chloride    103  ()  mmol/L


 


Carbon Dioxide    22  (22-30)  mmol/L


 


Anion Gap    9  mmol/L


 


BUN    23 H  (9-20)  mg/dL


 


Creatinine    0.79  (0.66-1.25)  mg/dL


 


Est GFR (CKD-EPI)AfAm    >90  (>60 ml/min/1.73 sqM)  


 


Est GFR (CKD-EPI)NonAf    >90  (>60 ml/min/1.73 sqM)  


 


Glucose    121 H  (74-99)  mg/dL


 


Calcium    9.5  (8.4-10.2)  mg/dL


 


Magnesium    2.2  (1.6-2.3)  mg/dL


 


Total Bilirubin    0.3  (0.2-1.3)  mg/dL


 


AST    30  (17-59)  U/L


 


ALT    36  (4-49)  U/L


 


Alkaline Phosphatase    59  ()  U/L


 


Troponin I     (0.000-0.034)  ng/mL


 


Total Protein    7.0  (6.3-8.2)  g/dL


 


Albumin    4.1  (3.5-5.0)  g/dL














  08/04/21 Range/Units





  17:14 


 


WBC   (3.8-10.6)  k/uL


 


RBC   (4.30-5.90)  m/uL


 


Hgb   (13.0-17.5)  gm/dL


 


Hct   (39.0-53.0)  %


 


MCV   (80.0-100.0)  fL


 


MCH   (25.0-35.0)  pg


 


MCHC   (31.0-37.0)  g/dL


 


RDW   (11.5-15.5)  %


 


Plt Count   (150-450)  k/uL


 


MPV   


 


Neutrophils %   %


 


Lymphocytes %   %


 


Monocytes %   %


 


Eosinophils %   %


 


Basophils %   %


 


Neutrophils #   (1.3-7.7)  k/uL


 


Lymphocytes #   (1.0-4.8)  k/uL


 


Monocytes #   (0-1.0)  k/uL


 


Eosinophils #   (0-0.7)  k/uL


 


Basophils #   (0-0.2)  k/uL


 


PT   (9.0-12.0)  sec


 


INR   (<1.2)  


 


APTT   (22.0-30.0)  sec


 


Sodium   (137-145)  mmol/L


 


Potassium   (3.5-5.1)  mmol/L


 


Chloride   ()  mmol/L


 


Carbon Dioxide   (22-30)  mmol/L


 


Anion Gap   mmol/L


 


BUN   (9-20)  mg/dL


 


Creatinine   (0.66-1.25)  mg/dL


 


Est GFR (CKD-EPI)AfAm   (>60 ml/min/1.73 sqM)  


 


Est GFR (CKD-EPI)NonAf   (>60 ml/min/1.73 sqM)  


 


Glucose   (74-99)  mg/dL


 


Calcium   (8.4-10.2)  mg/dL


 


Magnesium   (1.6-2.3)  mg/dL


 


Total Bilirubin   (0.2-1.3)  mg/dL


 


AST   (17-59)  U/L


 


ALT   (4-49)  U/L


 


Alkaline Phosphatase   ()  U/L


 


Troponin I  <0.012  (0.000-0.034)  ng/mL


 


Total Protein   (6.3-8.2)  g/dL


 


Albumin   (3.5-5.0)  g/dL














Disposition


Clinical Impression: 


 Chest pain, Sciatica





Disposition: ADMITTED AS IP TO THIS HOSP


Referrals: 


Tyra Marvin MD [Primary Care Provider] - 1-2 days


Time of Disposition: 19:20

## 2021-08-04 NOTE — XR
EXAMINATION TYPE: XR chest 2V

 

DATE OF EXAM: 8/4/2021

 

COMPARISON: NONE

 

HISTORY: Chest pain

 

TECHNIQUE:

 

FINDINGS: Heart and mediastinum are normal. There is some small linear density in the left midlung an
d apparent left lung surgery. There is no evidence of a pulmonary mass. There is no pleural effusion.
 There are no hilar masses. There is right shoulder prosthesis.

 

IMPRESSION: No active cardiopulmonary disease. There is some minimal scarring or subsegmental atelect
asis in the left lung without change compared to chest CT scan of 11/25/2020.

## 2021-08-05 LAB
CHOLEST SERPL-MCNC: 185 MG/DL (ref 0–200)
HDLC SERPL-MCNC: 58 MG/DL (ref 40–60)
LDLC SERPL CALC-MCNC: 88.4 MG/DL (ref 0–131)
TRIGL SERPL-MCNC: 193 MG/DL (ref 0–149)
VLDLC SERPL CALC-MCNC: 38.6 MG/DL (ref 5–40)

## 2021-08-05 RX ADMIN — METRONIDAZOLE SCH MLS/HR: 500 INJECTION, SOLUTION INTRAVENOUS at 16:55

## 2021-08-05 RX ADMIN — KETOROLAC TROMETHAMINE SCH MG: 15 INJECTION, SOLUTION INTRAMUSCULAR; INTRAVENOUS at 17:53

## 2021-08-05 RX ADMIN — NITROGLYCERIN SCH INCH: 20 OINTMENT TOPICAL at 01:46

## 2021-08-05 RX ADMIN — MORPHINE SULFATE PRN MG: 4 INJECTION, SOLUTION INTRAMUSCULAR; INTRAVENOUS at 23:35

## 2021-08-05 RX ADMIN — MORPHINE SULFATE PRN MG: 4 INJECTION, SOLUTION INTRAMUSCULAR; INTRAVENOUS at 18:33

## 2021-08-05 RX ADMIN — METRONIDAZOLE SCH MLS/HR: 500 INJECTION, SOLUTION INTRAVENOUS at 12:40

## 2021-08-05 RX ADMIN — METRONIDAZOLE SCH MLS/HR: 500 INJECTION, SOLUTION INTRAVENOUS at 23:31

## 2021-08-05 RX ADMIN — KETOROLAC TROMETHAMINE SCH MG: 15 INJECTION, SOLUTION INTRAMUSCULAR; INTRAVENOUS at 23:31

## 2021-08-05 RX ADMIN — MORPHINE SULFATE PRN MG: 4 INJECTION, SOLUTION INTRAMUSCULAR; INTRAVENOUS at 05:04

## 2021-08-05 RX ADMIN — MORPHINE SULFATE PRN MG: 4 INJECTION, SOLUTION INTRAMUSCULAR; INTRAVENOUS at 09:07

## 2021-08-05 RX ADMIN — MORPHINE SULFATE PRN MG: 4 INJECTION, SOLUTION INTRAMUSCULAR; INTRAVENOUS at 13:46

## 2021-08-05 RX ADMIN — MORPHINE SULFATE PRN MG: 2 INJECTION, SOLUTION INTRAMUSCULAR; INTRAVENOUS at 02:27

## 2021-08-05 RX ADMIN — KETOROLAC TROMETHAMINE SCH MG: 15 INJECTION, SOLUTION INTRAMUSCULAR; INTRAVENOUS at 12:38

## 2021-08-05 RX ADMIN — CYCLOBENZAPRINE HYDROCHLORIDE PRN MG: 10 TABLET, FILM COATED ORAL at 16:55

## 2021-08-05 RX ADMIN — NITROGLYCERIN SCH: 20 OINTMENT TOPICAL at 08:24

## 2021-08-05 RX ADMIN — HYDROCODONE BITARTRATE AND ACETAMINOPHEN PRN EACH: 5; 325 TABLET ORAL at 20:42

## 2021-08-05 RX ADMIN — LEVOFLOXACIN SCH MLS/HR: 5 INJECTION, SOLUTION INTRAVENOUS at 11:13

## 2021-08-05 NOTE — CONS
CONSULTATION



ATTENDING PHYSICIAN:

Dr. Marvin.



HISTORY OF PRESENT ILLNESS:

Mr. Hoffmann is a 41-year-old male with no cardiac history who presented with chest

heaviness on the left side.  The discomfort has been going on throughout the day

yesterday, respirophasic in pattern and positional.  He has a history of back

discomfort and prior fusion.  He has underwent a stress echocardiogram in April of this

year that revealed no evidence of inducible ischemia with preserved left ventricular

size and systolic function.  The patient's activity is limited at times because of his

back.  Because of the persistent symptoms, he came into the emergency room.  He

continues to have discomfort in the chest.  The discomfort continues to have the same

pattern, respirophasic and positional.  He has some tingling in the left arm.  He has

no prior history of myocardial infarction, angina pectoris, or documented congestive

heart failure.  He has no clear PND, orthopnea, or peripheral edema.



His coronary risk factors negative for smoking or diabetes mellitus.  He is not

hypertensive.  He has no history of documented hyperlipidemia.



MEDICATION:

His medications at home include: Dexamethasone and Flexeril.



REVIEW OF SYSTEMS:

RESPIRATORY system: He has no documented history of asthma, emphysema or bronchitis.

GI system: No recent GI bleeding.  No peptic ulcer disease.

 system: No dysuria or hematuria.

NERVOUS SYSTEM: No stroke or seizure.



PHYSICAL EXAMINATION:

He is a 41-year-old male, alert, oriented, in no apparent distress.

Blood pressure 115/80 with a heart rate in the 70s.

HEAD:  Normocephalic.  Eyes sclerae anicteric.

NECK: Good carotid upstroke.  No bruit. No jugular venous distention.

LUNGS:  Clear to auscultation.

HEART: Regular rate and rhythm S1, S2.  No S3.  No S4.  No murmur or rub.

ABDOMEN:  Soft, nontender.  Positive bowel sounds. No megaly.

EXTREMITIES:  No edema.  Intact distal pulses.

Chest discomfort reproducible by palpation and change with position as well as deep

breathing.



LAB DATA:

Lab data revealed troponin less than 0.012 for 3 samples.  BUN and creatinine 23 and

0.79.  Hemoglobin of 16, white blood cell of 15.6.  EKG revealed a sinus mechanism,

normal axis and intervals, QS in the 3 and AVF that was noted in the past, unchanged

with RS prime.  His CT angiogram of the chest that revealed no evidence of pulmonary

embolism.



IMPRESSION:

1. Chest discomfort, has atypical features for ischemic heart disease probably

    noncardiac with recent stress echocardiogram that revealed no evidence of inducible

    ischemia.

2. Musculoskeletal chest discomfort.



RECOMMENDATIONS:

From the cardiac standpoint, I see no evidence to suggest acute coronary syndrome in

view of the most recent stress test.  I see no indication to undergo any further

cardiac workup at this time.  We will see him on as-needed basis.  Please feel free to

call us for any questions.





MMODL / IJN: [id number] / Job#: [id number]

## 2021-08-05 NOTE — CT
EXAMINATION TYPE: CT angio chest

 

DATE OF EXAM: 8/5/2021

 

COMPARISON: 7/25/2020

 

HISTORY: Chest pain

 

CT DLP: 595.3 mGycm

Automated exposure control for dose reduction was used.

 

CONTRAST: 

Performed with IV Contrast, patient injected with 100 mL of Isovue 370.

 

There are 3-D post processed images.

 

The lungs are clear of consolidation. There is some mild atelectasis left posterior lung base. There 
is no pleural effusion. There is no pericardial effusion. Heart size is normal. There is no mediastin
al adenopathy. There are no hilar masses. Thoracic aorta is intact. There is no evidence of aneurysm 
or dissection.

 

There is no evidence of filling defect in the pulmonary arteries.

 

The thoracic spine is intact. Sternum is intact.

 

There is small hiatal hernia.

 

IMPRESSION:

No evidence of pulmonary embolism. There is some mild scarring and atelectasis left lung base similar
 to old exam. No suspicious pulmonary mass. Normal heart. Retinoid Dermatitis Normal Treatment: I recommended more frequent application of Cetaphil or CeraVe to the areas of dermatitis.

## 2021-08-05 NOTE — P.HPIM
History of Present Illness


H&P Date: 21





HISTORY OF PRESENT ILLNESS


This is a 41-year-old male patient of Dr. Marvin with past medical history of 

chronic back pain with lumbar fusion 4 years ago.  He was seen in the ER on  and  for back pain and diarrhea.  On , back x-ray showed no 

acute abnormalities of the lumbar spine.  CAT scan of the lumbar spine on  revealed posterior fusion L5-S1 with intravertebral disc spacer.  Hardware 

appears intact.  No acute fracture or traumatic subluxation.  No spinal canal 

stenosis or neural foraminal narrowing.  Patient was given Tylenol 3 and dex

amethasone.  Patient now presented on  due to chest pain that was on the

left side radiating to his left shoulder and arm as well as left upper quadrant 

bulge.  Patient states he cannot get his left arm to relax.  He contacted Dr. Marvin and had an EKG done that showed changes and patient was sent into the ER. 

He states he was up crying all night due to pain.  Regarding diarrhea, he has ha

d this for 7 days.  He denies any blood in his stools.  He denies any sick 

contacts he states that every time he eats or drinks something or goes right 

through him.  He does relate to being under a lot of stress.  Patient did 

undergo stress echocardiogram in April of this year which revealed no evidence 

of inducible ischemia with preserved left ventricular size and systolic 

function.





Patient presented to [facility] emergency center.  He was 

afebrile, heart rate in the 80s and 90s, blood pressure 123/84, pulse ox 96% on 

room air.  EKG was a sinus rhythm.  WBC 15.6, hemoglobin 16, platelet count 356.

 Sodium 134, potassium 4.8, chloride 103, CO2 22, BUN 23 and creatinine 0.79.  

Blood sugar 121.  Magnesium 2.2, liver function tests were normal.  Troponins 

negative on 3 draws.  Triglycerides 193, cholesterol 185, LDL 88, HDL 58.


Chest x-ray showed no cardiopulmonary disease.


CTA of the chest was negative for pulmonary embolism.  Mild scarring and 

atelectasis in the left lung base similar to old exam.  No suspicious pulmonary 

mass.  Normal heart.


Patient has been seen by cardiology and was atypical chest pain for heart 

disease, while musculoskeletal chest pain and cardiology has signed off.


Due to patient's left upper quadrant tenderness and ongoing diarrhea for 1 week,

CT of the abdomen and pelvis ordered which is delayed until tomorrow due to 

earlier contrast.  Patient started on IV antibiotics for suspected colitis 





REVIEW OF SYSTEMS


Constitutional: No fever, no chills, no night sweats.  No weight change.  No 

weakness, fatigue or lethargy.  No daytime sleepiness.


EENT: No headache.  No blurred vision or double vision, no loss of vision.  No 

loss of Hearing, no ringing in the ears, no dizziness.  No nasal drainage or 

congestion.  No epistaxis.  No sore throat.


Lungs: No shortness of breath, cough, no sputum production.  No wheezing.


Cardiovascular: Reports chest pain, no lower extremity edema.  No palpitations. 

No paroxysmal nocturnal dyspnea.  No orthopnea.  No lightheadedness or 

dizziness.  No syncopal episodes.


Abdominal: Reports abdominal pain.  No nausea, vomiting.  Reports diarrhea.  No 

constipation.  No bloody or tarry stools. Reports loss of appetite.


Genitourinary: No dysuria, increased frequency, urgency.  No urinary retention.


Musculoskeletal: No myalgias.  No muscle weakness, no gait dysfunction, no frequ

ent falls.  No back pain.  No neck pain.


Integumentary: No wounds, no lesions.  No rash or pruritus.  No unusual 

bruising.  No change in hair or nails.


Neurologic: No aphasia. No facial droop. No change in mentation. No head injury.

No headache. No paralysis. No paresthesia.


Psychiatric: No depression.  Reports anxiety.  No mood swings.


Endocrine: No abnormal blood sugars.  No weight change.  No excessive sweating 

or thirst.  No cold intolerance.  





SOCIAL HISTORY


Patient is a lifelong nonsmoker, no illicit drug use, marijuana use or alcohol 

use.  Patient is  and lives alone.  





FAMILY HISTORY


Mother  at age 59 from lupus.  Patient does not know his father.  Patient 

has one brother with autoimmune disorder.  Patient does not have any sisters.  





PHYSICAL EXAMINATION


Gen: This brodie 41-year-old obese male seen sitting on the ER stretcher, eating 

eggs but states he does not have any appetite.


EENT: Head is atraumatic, normocephalic. Pupils equal, round. Sclerae is 

anicteric. 


NECK: Supple. No JVD. No lymphadenopathy. No thyromegaly. 


LUNGS: Clear to auscultation. No wheezes or rhonchi.  No intercostal 

retractions.


HEART: Regular rate and rhythm. No murmur. 


ABDOMEN: Soft. Bowel sounds are present. No masses. Low left rib and left upper 

quadrant tenderness.


EXTREMITIES: No pedal edema.  No calf tenderness.


NEUROLOGICAL: Patient is awake, alert and oriented x3. Cranial nerves 2 through 

12 are grossly intact. 





ASSESSMENT AND PLAN


1. left upper quadrant pain and tenderness with diarrhea, possible colitis.  

Patient started on Levaquin 500 mg IV piggyback daily and Flagyl 500 mg every 8 

hours, continue morphine as needed for pain, Zofran as needed for nausea, CT of 

the abdomen and pelvis with contrast tomorrow.  Toradol 15 mg IV push every 6 

hours as needed for pain control.





2.  Chest pain, musculoskeletal.  Cardiology consult appreciated.  Cardiology 

has signed off.





3.  Chronic low back pain with previous lumbar fusion.  Continue Flexeril 10 mg 

twice daily as needed.





4.  Leukocytosis most likely secondary to steroid use.  Repeat CBC tomorrow.





5.  Generalized anxiety disorder.





6.  GI prophylaxis.  Protonix 40 mg IV push daily.





7.  DVT prophylaxis.  Lovenox daily.





Patient will be admitted to the hospital for a minimum of 2 night stay.





DISCHARGE PLAN


Home.





Impression and plan of care have been directed as dictated by the signing 

physician.  Daya Rubio nurse practitioner acting as scribe for signing 

physician.





Past Medical History


Past Medical History: No Reported History


Additional Past Medical History / Comment(s): multiple dislocation of rt 

shoulder, back pain


History of Any Multi-Drug Resistant Organisms: None Reported


Past Surgical History: Back Surgery, Orthopedic Surgery


Additional Past Surgical History / Comment(s): rt shoulder


Past Psychological History: No Psychological Hx Reported


Smoking Status: Never smoker


Past Alcohol Use History: None Reported


Past Drug Use History: None Reported





Medications and Allergies


                                Home Medications











 Medication  Instructions  Recorded  Confirmed  Type


 


Cyclobenzaprine [Flexeril] 10 mg PO BID PRN 21 History


 


dexAMETHasone [Dexamethasone] 4 mg PO BID 21 History








                                    Allergies











Allergy/AdvReac Type Severity Reaction Status Date / Time


 


hydromorphone [From Dilaudid] Allergy  Rash/Hives Verified 21 17:53


 


latex Allergy  Rash/Hives Verified 21 17:53


 


meperidine [From Demerol] Allergy  Rash/Hives Verified 21 17:53














Physical Exam


Vitals: 


                                   Vital Signs











  Temp Pulse Resp BP Pulse Ox


 


 21 09:09   73  18  115/85  98


 


 21 06:28  96.9 F L  60  16  122/73  96


 


 21 05:03   65  16  131/87  95


 


 21 01:49   71  16  112/80  97


 


 21 20:12     130/79 


 


 21 20:02     131/84 


 


 21 19:56   82  16  115/83  94 L


 


 21 18:49   86  16  120/80  99


 


 21 15:57  98.0 F  94  20  123/84  96








                                Intake and Output











 21





 22:59 06:59 14:59


 


Other:   


 


  Weight 111.13 kg  














Results


CBC & Chem 7: 


                                 21 17:14





                                 21 17:14


Labs: 


                  Abnormal Lab Results - Last 24 Hours (Table)











  21 Range/Units





  17:14 17:14 


 


WBC  15.6 H   (3.8-10.6)  k/uL


 


Neutrophils #  12.9 H   (1.3-7.7)  k/uL


 


Sodium   134 L  (137-145)  mmol/L


 


BUN   23 H  (9-20)  mg/dL


 


Glucose   121 H  (74-99)  mg/dL

## 2021-08-06 LAB
BUN SERPL-SCNC: 24 MG/DL (ref 9–20)
PH UR: 7 [PH] (ref 5–8)
SP GR UR: >1.05 (ref 1–1.03)
UROBILINOGEN UR QL STRIP: <2 MG/DL (ref ?–2)

## 2021-08-06 RX ADMIN — IOPAMIDOL PRN ML: 612 INJECTION, SOLUTION INTRAVENOUS at 09:08

## 2021-08-06 RX ADMIN — ENOXAPARIN SODIUM SCH MG: 40 INJECTION SUBCUTANEOUS at 07:42

## 2021-08-06 RX ADMIN — ONDANSETRON PRN MG: 2 INJECTION INTRAMUSCULAR; INTRAVENOUS at 21:48

## 2021-08-06 RX ADMIN — METRONIDAZOLE SCH MLS/HR: 500 INJECTION, SOLUTION INTRAVENOUS at 07:42

## 2021-08-06 RX ADMIN — MORPHINE SULFATE PRN MG: 4 INJECTION, SOLUTION INTRAMUSCULAR; INTRAVENOUS at 19:27

## 2021-08-06 RX ADMIN — KETOROLAC TROMETHAMINE SCH MG: 15 INJECTION, SOLUTION INTRAMUSCULAR; INTRAVENOUS at 05:51

## 2021-08-06 RX ADMIN — HYDROCODONE BITARTRATE AND ACETAMINOPHEN PRN EACH: 5; 325 TABLET ORAL at 04:22

## 2021-08-06 RX ADMIN — ONDANSETRON PRN MG: 2 INJECTION INTRAMUSCULAR; INTRAVENOUS at 09:17

## 2021-08-06 RX ADMIN — MORPHINE SULFATE PRN MG: 4 INJECTION, SOLUTION INTRAMUSCULAR; INTRAVENOUS at 09:10

## 2021-08-06 RX ADMIN — HYDROCODONE BITARTRATE AND ACETAMINOPHEN PRN EACH: 5; 325 TABLET ORAL at 20:58

## 2021-08-06 RX ADMIN — PANTOPRAZOLE SODIUM SCH MG: 40 INJECTION, POWDER, FOR SOLUTION INTRAVENOUS at 07:42

## 2021-08-06 RX ADMIN — MORPHINE SULFATE PRN MG: 4 INJECTION, SOLUTION INTRAMUSCULAR; INTRAVENOUS at 04:23

## 2021-08-06 RX ADMIN — LEVOFLOXACIN SCH MLS/HR: 5 INJECTION, SOLUTION INTRAVENOUS at 09:10

## 2021-08-06 RX ADMIN — KETOROLAC TROMETHAMINE SCH MG: 15 INJECTION, SOLUTION INTRAMUSCULAR; INTRAVENOUS at 11:01

## 2021-08-06 RX ADMIN — KETOROLAC TROMETHAMINE SCH MG: 15 INJECTION, SOLUTION INTRAMUSCULAR; INTRAVENOUS at 23:22

## 2021-08-06 RX ADMIN — IOPAMIDOL PRN ML: 612 INJECTION, SOLUTION INTRAVENOUS at 10:09

## 2021-08-06 RX ADMIN — KETOROLAC TROMETHAMINE SCH MG: 15 INJECTION, SOLUTION INTRAMUSCULAR; INTRAVENOUS at 17:40

## 2021-08-06 RX ADMIN — METRONIDAZOLE SCH MLS/HR: 500 INJECTION, SOLUTION INTRAVENOUS at 23:22

## 2021-08-06 RX ADMIN — ASPIRIN 81 MG CHEWABLE TABLET SCH MG: 81 TABLET CHEWABLE at 07:41

## 2021-08-06 RX ADMIN — METRONIDAZOLE SCH MLS/HR: 500 INJECTION, SOLUTION INTRAVENOUS at 14:34

## 2021-08-06 RX ADMIN — MORPHINE SULFATE PRN MG: 4 INJECTION, SOLUTION INTRAMUSCULAR; INTRAVENOUS at 13:41

## 2021-08-06 RX ADMIN — ONDANSETRON PRN MG: 2 INJECTION INTRAMUSCULAR; INTRAVENOUS at 14:34

## 2021-08-06 RX ADMIN — CYCLOBENZAPRINE HYDROCHLORIDE PRN MG: 10 TABLET, FILM COATED ORAL at 07:41

## 2021-08-06 RX ADMIN — HYDROCODONE BITARTRATE AND ACETAMINOPHEN PRN EACH: 5; 325 TABLET ORAL at 11:58

## 2021-08-06 NOTE — P.PN
Subjective


Progress Note Date: 08/06/21





HISTORY OF PRESENT ILLNESS


This is a 41-year-old male patient of Dr. Marvin with past medical history of 

chronic back pain with lumbar fusion 4 years ago.  He was seen in the ER on July 29 and August 1 for back pain and diarrhea.  On July 29, back x-ray showed no 

acute abnormalities of the lumbar spine.  CAT scan of the lumbar spine on August 1 revealed posterior fusion L5-S1 with intravertebral disc spacer.  Hardware 

appears intact.  No acute fracture or traumatic subluxation.  No spinal canal 

stenosis or neural foraminal narrowing.  Patient was given Tylenol 3 and 

dexamethasone.  Patient now presented on August 4 due to chest pain that was on 

the left side radiating to his left shoulder and arm as well as left upper 

quadrant bulge.  Patient states he cannot get his left arm to relax.  He 

contacted Dr. Marvin and had an EKG done that showed changes and patient was sent

into the ER.  He states he was up crying all night due to pain.  Regarding 

diarrhea, he has had this for 7 days.  He denies any blood in his stools.  He 

denies any sick contacts he states that every time he eats or drinks something 

or goes right through him.  He does relate to being under a lot of stress.  

Patient did undergo stress echocardiogram in April of this year which revealed 

no evidence of inducible ischemia with preserved left ventricular size and 

systolic function.





Patient presented to [facility] emergency center.  He was 

afebrile, heart rate in the 80s and 90s, blood pressure 123/84, pulse ox 96% on 

room air.  EKG was a sinus rhythm.  WBC 15.6, hemoglobin 16, platelet count 356.

 Sodium 134, potassium 4.8, chloride 103, CO2 22, BUN 23 and creatinine 0.79.  

Blood sugar 121.  Magnesium 2.2, liver function tests were normal.  Troponins 

negative on 3 draws.  Triglycerides 193, cholesterol 185, LDL 88, HDL 58.


Chest x-ray showed no cardiopulmonary disease.


CTA of the chest was negative for pulmonary embolism.  Mild scarring and 

atelectasis in the left lung base similar to old exam.  No suspicious pulmonary 

mass.  Normal heart.


Patient has been seen by cardiology and was atypical chest pain for heart 

disease, while musculoskeletal chest pain and cardiology has signed off.


Due to patient's left upper quadrant tenderness and ongoing diarrhea for 1 week,

CT of the abdomen and pelvis ordered which is delayed until tomorrow due to 

earlier contrast.  Patient started on IV antibiotics for suspected colitis 





8/6: Patient states that his left upper quadrant abdominal pain is not any 

better.  His last bowel movement was before he came into the hospital.  MiraLAX 

started daily.  CAT scan of the abdomen and pelvis will be completed today.  

Repeat blood work reveals BUN 24 and creatinine 0.75.  Repeat CBC and CMP 

ordered for tomorrow.  Patient has been afebrile, heart rate 74, blood pressure 

133/85, pulse ox 95% on room air.








REVIEW OF SYSTEMS


Constitutional: No fever, no chills, no night sweats.  No weight change.  No 

weakness, fatigue or lethargy.  No daytime sleepiness.


EENT: No headache.  No blurred vision or double vision, no loss of vision.  No 

loss of Hearing, no ringing in the ears, no dizziness.  No nasal drainage or 

congestion.  No epistaxis.  No sore throat.


Lungs: No shortness of breath, cough, no sputum production.  No wheezing.


Cardiovascular: Reports chest pain, no lower extremity edema.  No palpitations. 

No paroxysmal nocturnal dyspnea.  No orthopnea.  No lightheadedness or 

dizziness.  No syncopal episodes.


Abdominal: Reports abdominal pain 10 years.  No nausea, vomiting.  Reports 

diarrhea.  No constipation.  No bloody or tarry stools. Reports loss of 

appetite.


Genitourinary: No dysuria, increased frequency, urgency.  No urinary retention.


Musculoskeletal: No myalgias.  No muscle weakness, no gait dysfunction, no 

frequent falls.  No back pain.  No neck pain.


Integumentary: No wounds, no lesions.  No rash or pruritus.  No unusual 

bruising.  No change in hair or nails.


Neurologic: No aphasia. No facial droop. No change in mentation. No head injury.

No headache. No paralysis. No paresthesia.


Psychiatric: No depression.  Reports anxiety.  No mood swings.


Endocrine: No abnormal blood sugars.  No weight change.  No excessive sweating 

or thirst.  No cold intolerance.  





PHYSICAL EXAMINATION


Gen: This brodie 41-year-old obese male seen sitting on the ER stretcher, eating 

eggs but states he does not have any appetite.


EENT: Head is atraumatic, normocephalic. Pupils equal, round. Sclerae is 

anicteric. 


NECK: Supple. No JVD. No lymphadenopathy. No thyromegaly. 


LUNGS: Clear to auscultation. No wheezes or rhonchi.  No intercostal 

retractions.


HEART: Regular rate and rhythm. No murmur. 


ABDOMEN: Soft. Bowel sounds are present. No masses. Low left rib and left upper 

quadrant tenderness.


EXTREMITIES: No pedal edema.  No calf tenderness.


NEUROLOGICAL: Patient is awake, alert and oriented x3. Cranial nerves 2 through 

12 are grossly intact. 





ASSESSMENT AND PLAN


1. left upper quadrant pain and tenderness with diarrhea, possible colitis.  Co

ntinue Levaquin 500 mg IV piggyback daily and Flagyl 500 mg every 8 hours, 

continue morphine as needed for pain, Zofran as needed for nausea, CT of the 

abdomen and pelvis with contrast today.  Toradol 15 mg IV push every 6 hours as 

needed for pain control.





2.  Chest pain, musculoskeletal.  Cardiology consult appreciated.  Cardiology 

has signed off.





3.  Chronic low back pain with previous lumbar fusion.  Continue Flexeril 10 mg 

twice daily as needed.





4.  Leukocytosis most likely secondary to steroid use.  Repeat CBC tomorrow.





5.  Generalized anxiety disorder.





6.  GI prophylaxis.  Protonix 40 mg IV push daily.





7.  DVT prophylaxis.  Lovenox daily.





DISCHARGE PLAN


Home.





Impression and plan of care have been directed as dictated by the signing 

physician.  Daya Rubio nurse practitioner acting as scribe for signing 

physician.





Past Medical History





Objective





- Vital Signs


Vital signs: 


                                   Vital Signs











Temp  97.9 F   08/06/21 05:00


 


Pulse  74   08/06/21 05:00


 


Resp  18   08/06/21 05:00


 


BP  133/85   08/06/21 05:00


 


Pulse Ox  95   08/06/21 05:00








                                 Intake & Output











 08/05/21 08/06/21 08/06/21





 18:59 06:59 18:59


 


Intake Total  1080 


 


Balance  1080 


 


Weight 111.13 kg  


 


Intake:   


 


  Intake, IV Titration  100 





  Amount   


 


    metroNIDAZOLE-NS   100 





    mg In Saline 1 100ml.bag   





    @ 100 mls/hr IVPB Q8HR   





    TYLER Rx#:[id number]   


 


  Oral  980 


 


Other:   


 


  # Voids 1 2 














- Labs


CBC & Chem 7: 


                                 08/04/21 17:14





                                 08/06/21 06:28


Labs: 


                  Abnormal Lab Results - Last 24 Hours (Table)











  08/04/21 08/06/21 Range/Units





  16:16 06:28 


 


BUN   24 H  (9-20)  mg/dL


 


Triglycerides  193.0 H   (0.0-149.0)  mg/dL

## 2021-08-06 NOTE — CT
EXAMINATION TYPE: CT abdomen pelvis w con

 

DATE OF EXAM: 8/6/2021

 

COMPARISON: 11/25/2020

 

HISTORY: 41-year-old male LUQ pain, tenderness, diarrhea.

 

TECHNIQUE: Contiguous axial scanning of the abdomen and pelvis following administration of 100 ml Iso
sunny 300 IV contrast.  Delayed images through the kidneys and coronal/sagittal reconstructions perform
ed.

 

CT DLP: 2036 mGycm

Automated exposure control for dose reduction was used.

 

 

FINDINGS: 

 

Heart normal size without pericardial effusion.

 

Some surgical material noted at the GE junction. There appears to be a moderate-sized hiatal hernia.

 

Left posterior thoracotomy change. Some surgical material here. Subpleural areas of strandy atelectas
is/scarring of the lung bases, particularly on the left. More focal nodular area posterolateral left 
base, axial image 13 measures 2.1 x 1.1 cm, unchanged from 11/25/2020 suggesting some probable scarri
ng. No pleural effusion.

 

Liver enlarged at 18.6 cm. Diffuse low-attenuation. Portal venous system is patent. No biliary ductal
 dilatation.

 

Gallbladder surgically absent.

 

Adrenal glands, kidneys, spleen, pancreas within normal limits.

 

No dilated small bowel, free fluid, or free air. No mesenteric or retroperitoneal lymphadenopathy.

 

Mild overall stool burden. No pericolonic inflammatory change.

 

Bladder is urine distended. Prostate gland measures 3.5 cm wide. No abnormal fluid collection in the 
pelvis or pelvic lymphadenopathy.

 

Bones: Post surgical change of L5-S1 posterior and interbody fusion.

 

 

 

IMPRESSION: 

 

1. THERE IS A MODERATE-SIZED HIATAL HERNIA. POSSIBLE SURGICAL MATERIAL AT THE GE JUNCTION. QUERY A FA
ILED NISSEN FUNDOPLICATION.

2. 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

3. REDEMONSTRATED LEFT LOWER POSTERIOR THORACOTOMY CHANGE WITH SOME UNDERLYING PLEURAL PARENCHYMAL SC
ARRING.

## 2021-08-07 LAB
ALBUMIN SERPL-MCNC: 4.2 G/DL (ref 3.8–4.9)
ALBUMIN/GLOB SERPL: 1.62 G/DL (ref 1.6–3.17)
ALP SERPL-CCNC: 136 U/L (ref 41–126)
ALT SERPL-CCNC: 401 U/L (ref 10–49)
ANION GAP SERPL CALC-SCNC: 7.5 MMOL/L (ref 4–12)
AST SERPL-CCNC: 190 U/L (ref 14–35)
BUN SERPL-SCNC: 14 MG/DL (ref 9–27)
BUN/CREAT SERPL: 14 RATIO (ref 12–20)
CALCIUM SPEC-MCNC: 8.8 MG/DL (ref 8.7–10.3)
CHLORIDE SERPL-SCNC: 100 MMOL/L (ref 96–109)
CO2 SERPL-SCNC: 28.5 MMOL/L (ref 21.6–31.8)
ERYTHROCYTE [DISTWIDTH] IN BLOOD BY AUTOMATED COUNT: 5.15 M/UL (ref 4.3–5.9)
ERYTHROCYTE [DISTWIDTH] IN BLOOD: 14 % (ref 11.5–15.5)
GLOBULIN SER CALC-MCNC: 2.6 G/DL (ref 1.6–3.3)
GLUCOSE SERPL-MCNC: 87 MG/DL (ref 70–110)
HCT VFR BLD AUTO: 49.2 % (ref 39–53)
HGB BLD-MCNC: 16.3 GM/DL (ref 13–17.5)
MCH RBC QN AUTO: 31.6 PG (ref 25–35)
MCHC RBC AUTO-ENTMCNC: 33.1 G/DL (ref 31–37)
MCV RBC AUTO: 95.5 FL (ref 80–100)
PLATELET # BLD AUTO: 224 K/UL (ref 150–450)
POTASSIUM SERPL-SCNC: 4.1 MMOL/L (ref 3.5–5.5)
PROT SERPL-MCNC: 6.8 G/DL (ref 6.2–8.2)
SODIUM SERPL-SCNC: 136 MMOL/L (ref 135–145)
WBC # BLD AUTO: 8 K/UL (ref 3.8–10.6)

## 2021-08-07 RX ADMIN — OFLOXACIN SCH DROPS: 3 SOLUTION/ DROPS OPHTHALMIC at 21:17

## 2021-08-07 RX ADMIN — METRONIDAZOLE SCH MLS/HR: 500 INJECTION, SOLUTION INTRAVENOUS at 08:37

## 2021-08-07 RX ADMIN — METRONIDAZOLE SCH MLS/HR: 500 INJECTION, SOLUTION INTRAVENOUS at 23:31

## 2021-08-07 RX ADMIN — KETOROLAC TROMETHAMINE SCH MG: 15 INJECTION, SOLUTION INTRAMUSCULAR; INTRAVENOUS at 23:30

## 2021-08-07 RX ADMIN — KETOROLAC TROMETHAMINE SCH MG: 15 INJECTION, SOLUTION INTRAMUSCULAR; INTRAVENOUS at 05:33

## 2021-08-07 RX ADMIN — ASPIRIN 81 MG CHEWABLE TABLET SCH MG: 81 TABLET CHEWABLE at 08:38

## 2021-08-07 RX ADMIN — HYDROCODONE BITARTRATE AND ACETAMINOPHEN PRN EACH: 5; 325 TABLET ORAL at 15:30

## 2021-08-07 RX ADMIN — MORPHINE SULFATE PRN MG: 4 INJECTION, SOLUTION INTRAMUSCULAR; INTRAVENOUS at 02:31

## 2021-08-07 RX ADMIN — KETOROLAC TROMETHAMINE SCH MG: 15 INJECTION, SOLUTION INTRAMUSCULAR; INTRAVENOUS at 11:52

## 2021-08-07 RX ADMIN — LEVOFLOXACIN SCH MLS/HR: 5 INJECTION, SOLUTION INTRAVENOUS at 11:54

## 2021-08-07 RX ADMIN — HYDROCODONE BITARTRATE AND ACETAMINOPHEN PRN EACH: 5; 325 TABLET ORAL at 21:16

## 2021-08-07 RX ADMIN — HYDROCODONE BITARTRATE AND ACETAMINOPHEN PRN EACH: 5; 325 TABLET ORAL at 08:38

## 2021-08-07 RX ADMIN — METRONIDAZOLE SCH MLS/HR: 500 INJECTION, SOLUTION INTRAVENOUS at 15:22

## 2021-08-07 RX ADMIN — PANTOPRAZOLE SODIUM SCH MG: 40 INJECTION, POWDER, FOR SOLUTION INTRAVENOUS at 08:37

## 2021-08-07 RX ADMIN — KETOROLAC TROMETHAMINE SCH MG: 15 INJECTION, SOLUTION INTRAMUSCULAR; INTRAVENOUS at 17:30

## 2021-08-07 RX ADMIN — ENOXAPARIN SODIUM SCH: 40 INJECTION SUBCUTANEOUS at 08:34

## 2021-08-07 NOTE — P.GSCN
History of Present Illness


Consult date: 21


History of present illness: 





41-year-old male presented to the emergency department with complaints of left-

sided chest pain and left arm numbness.  Concern was for cardiac issue.  Patient

also complained of left upper quadrant abdominal pain.  Denied any significant 

nausea or vomiting.  He has been nothing by mouth and states that he is hungry 

at this time.  On workup, patient did have a CT of the abdomen and pelvis.  This

did reveal a hiatal hernia with possible failed previous Nissen fundoplication. 

After discussion with the patient, the patient states that he did have a 

paraesophageal hernia repaired approximately 6 or 7 years ago in [facility].  He states this was done through a posterior approach.  He does not 

recall the physician that did perform this.  He states that at that time, this 

was performed secondary to significant hernia that involved 'stomach in his 

chest and wrapped around the esophagus'. 





Review of Systems


All systems: negative





Past Medical History


Past Medical History: No Reported History


Additional Past Medical History / Comment(s): multiple dislocation of rt 

shoulder, back pain


History of Any Multi-Drug Resistant Organisms: None Reported


Past Surgical History: Back Surgery, Orthopedic Surgery


Additional Past Surgical History / Comment(s): rt shoulder


Past Psychological History: No Psychological Hx Reported


Smoking Status: Never smoker


Past Alcohol Use History: None Reported


Past Drug Use History: None Reported





- Past Family History


  ** Mother


Additional Family Medical History / Comment(s): lupus, 





  ** Father


History Unknown: Yes





  ** Brother(s)


Additional Family Medical History / Comment(s): autoimmune disorder





Medications and Allergies


                                Home Medications











 Medication  Instructions  Recorded  Confirmed  Type


 


Cyclobenzaprine [Flexeril] 10 mg PO BID PRN 21 History


 


dexAMETHasone [Dexamethasone] 4 mg PO BID 21 History








                                    Allergies











Allergy/AdvReac Type Severity Reaction Status Date / Time


 


hydromorphone [From Dilaudid] Allergy  Rash/Hives Verified 21 17:53


 


latex Allergy  Rash/Hives Verified 21 17:53


 


meperidine [From Demerol] Allergy  Rash/Hives Verified 21 17:53














Surgical - Exam


Osteopathic Statement: *.  No significant issues noted on an osteopathic 

structural exam other than those noted in the History and Physical/Consult.


                                   Vital Signs











Temp Pulse Resp BP Pulse Ox


 


 98.0 F   94   20   123/84   96 


 


 21 15:57  21 15:57  21 15:57  21 15:57  21 15:57














- General


well developed, well nourished, no distress





- Eyes


normal ocular movement





- ENT


no hearing loss





- Neck


trachea midline





- Respiratory


normal respiratory effort





- Abdomen


Abdomen: soft, non tender





- Psychiatric


oriented to time, oriented to person, oriented to place





Results





- Labs





                                 21 06:18





                                 21 06:28


                  Abnormal Lab Results - Last 24 Hours (Table)











  21 Range/Units





  11:05 


 


Ur Specific Gravity  >1.050 H  (1.001-1.035)  








                      Microbiology - Last 24 Hours (Table)











 21 18:20 Stool Culture - Preliminary





 Stool 














Assessment and Plan


Plan: 





41-year-old male with complaints of left upper quadrant and left chest pain.  On

abdominal workup, patient appears to have a recurrent hiatal hernia with failed 

previous repair.  After further discussion with the patient, does appear that he

did have a repair 6 or 7 years ago.  Due to failed repair, I would recommend a 

foregut specialist for further evaluation and repair as an outpatient.  

Currently there is no evidence of strangulation of the stomach or any nausea or 

vomiting.  I would recommend pain control and outpatient follow-up for 

evaluation for repeat Nissen fundoplication with a foregut specialist in the 

[place] area.

## 2021-08-07 NOTE — P.PN
Subjective


Progress Note Date: 08/07/21





HISTORY OF PRESENT ILLNESS


This is a 41-year-old male patient of Dr. Marvin with past medical history of 

chronic back pain with lumbar fusion 4 years ago.  He was seen in the ER on July 29 and August 1 for back pain and diarrhea.  On July 29, back x-ray showed no 

acute abnormalities of the lumbar spine.  CAT scan of the lumbar spine on August 1 revealed posterior fusion L5-S1 with intravertebral disc spacer.  Hardware 

appears intact.  No acute fracture or traumatic subluxation.  No spinal canal 

stenosis or neural foraminal narrowing.  Patient was given Tylenol 3 and 

dexamethasone.  Patient now presented on August 4 due to chest pain that was on 

the left side radiating to his left shoulder and arm as well as left upper 

quadrant bulge.  Patient states he cannot get his left arm to relax.  He 

contacted Dr. Marvin and had an EKG done that showed changes and patient was sent

into the ER.  He states he was up crying all night due to pain.  Regarding 

diarrhea, he has had this for 7 days.  He denies any blood in his stools.  He 

denies any sick contacts he states that every time he eats or drinks something 

or goes right through him.  He does relate to being under a lot of stress.  

Patient did undergo stress echocardiogram in April of this year which revealed 

no evidence of inducible ischemia with preserved left ventricular size and 

systolic function.





Patient presented to [facility] emergency center.  He was 

afebrile, heart rate in the 80s and 90s, blood pressure 123/84, pulse ox 96% on 

room air.  EKG was a sinus rhythm.  WBC 15.6, hemoglobin 16, platelet count 356.

 Sodium 134, potassium 4.8, chloride 103, CO2 22, BUN 23 and creatinine 0.79.  

Blood sugar 121.  Magnesium 2.2, liver function tests were normal.  Troponins 

negative on 3 draws.  Triglycerides 193, cholesterol 185, LDL 88, HDL 58.


Chest x-ray showed no cardiopulmonary disease.


CTA of the chest was negative for pulmonary embolism.  Mild scarring and 

atelectasis in the left lung base similar to old exam.  No suspicious pulmonary 

mass.  Normal heart.


Patient has been seen by cardiology and was atypical chest pain for heart 

disease, while musculoskeletal chest pain and cardiology has signed off.


Due to patient's left upper quadrant tenderness and ongoing diarrhea for 1 week,

CT of the abdomen and pelvis ordered which is delayed until tomorrow due to 

earlier contrast.  Patient started on IV antibiotics for suspected colitis 





8/6: Patient states that his left upper quadrant abdominal pain is not any 

better.  His last bowel movement was before he came into the hospital.  MiraLAX 

started daily.  CAT scan of the abdomen and pelvis will be completed today.  

Repeat blood work reveals BUN 24 and creatinine 0.75.  Repeat CBC and CMP 

ordered for tomorrow.  Patient has been afebrile, heart rate 74, blood pressure 

133/85, pulse ox 95% on room air.





8/7: Continues to have significant amount of left upper quadrant pain.  Still 

has not had a bowel movement.  CT of the abdomen and pelvis impression: Moderate

size hiatal hernia.  Possible surgical material at the GE junction.  Possible 

failed Nissen fundoplication.  Hepatomegaly 18.6 cm's with hepatic steatosis.  

Redemonstrated left lower posterior thoracotomy change with some underlying 

pleural parenchymal scarring.  Surgical consult ordered.  Discussed with the 

patient that he may need to be transferred if surgical intervention is required.








REVIEW OF SYSTEMS


Constitutional: No fever, no chills, no night sweats.  No weight change.  No 

weakness, fatigue or lethargy.  No daytime sleepiness.


EENT: No headache.  No blurred vision or double vision, no loss of vision.  No 

loss of Hearing, no ringing in the ears, no dizziness.  No nasal drainage or 

congestion.  No epistaxis.  No sore throat.


Lungs: No shortness of breath, cough, no sputum production.  No wheezing.


Cardiovascular: Reports chest pain, no lower extremity edema.  No palpitations. 

No paroxysmal nocturnal dyspnea.  No orthopnea.  No lightheadedness or 

dizziness.  No syncopal episodes.


Abdominal: Reports abdominal pain 10 years.  No nausea, vomiting.  Reports 

diarrhea.  No constipation.  No bloody or tarry stools. Reports loss of 

appetite.


Genitourinary: No dysuria, increased frequency, urgency.  No urinary retention.


Musculoskeletal: No myalgias.  No muscle weakness, no gait dysfunction, no 

frequent falls.  No back pain.  No neck pain.


Integumentary: No wounds, no lesions.  No rash or pruritus.  No unusual 

bruising.  No change in hair or nails.


Neurologic: No aphasia. No facial droop. No change in mentation. No head injury.

No headache. No paralysis. No paresthesia.


Psychiatric: No depression.  Reports anxiety.  No mood swings.


Endocrine: No abnormal blood sugars.  No weight change.  No excessive sweating 

or thirst.  No cold intolerance.  





PHYSICAL EXAMINATION


Gen: This brodie 41-year-old obese male seen sitting on the ER stretcher, eating 

eggs but states he does not have any appetite.


EENT: Head is atraumatic, normocephalic. Pupils equal, round. Sclerae is 

anicteric. 


NECK: Supple. No JVD. No lymphadenopathy. No thyromegaly. 


LUNGS: Clear to auscultation. No wheezes or rhonchi.  No intercostal 

retractions.


HEART: Regular rate and rhythm. No murmur. 


ABDOMEN: Soft. Bowel sounds are present. No masses. Low left rib and left upper 

quadrant tenderness.


EXTREMITIES: No pedal edema.  No calf tenderness.


NEUROLOGICAL: Patient is awake, alert and oriented x3. Cranial nerves 2 through 

12 are grossly intact. 





ASSESSMENT AND PLAN


1. left upper quadrant pain and tenderness with diarrhea, possible colitis.  

Continue Levaquin 500 mg IV piggyback daily and Flagyl 500 mg every 8 hours, 

continue morphine as needed for pain, Zofran as needed for nausea, CT of the 

abdomen and pelvis ulcer noted above.  Consult surgery..  Toradol 15 mg IV push 

every 6 hours as needed for pain control.





2.  Chest pain, musculoskeletal.  Cardiology consult appreciated.  Cardiology 

has signed off.





3.  Chronic low back pain with previous lumbar fusion.  Continue Flexeril 10 mg 

twice daily as needed.





4.  Leukocytosis most likely secondary to steroid use.  Repeat CBC tomorrow.





5.  Generalized anxiety disorder.





6.  GI prophylaxis.  Protonix 40 mg IV push daily.





7.  DVT prophylaxis.  Lovenox daily.





DISCHARGE PLAN


Home.





Impression and plan of care have been directed as dictated by the signing 

physician.  Dawn Gallo nurse practitioner acting as scribe for signing 

physician.








Objective





- Vital Signs


Vital signs: 


                                   Vital Signs











Temp  97.9 F   08/07/21 04:09


 


Pulse  71   08/07/21 04:09


 


Resp  18   08/07/21 04:09


 


BP  136/82   08/07/21 04:09


 


Pulse Ox  95   08/07/21 04:09








                                 Intake & Output











 08/06/21 08/07/21 08/07/21





 18:59 06:59 18:59


 


Intake Total  100 


 


Output Total  200 


 


Balance  -100 


 


Intake:   


 


  Intake, IV Titration  100 





  Amount   


 


    metroNIDAZOLE-NS   100 





    mg In Saline 1 100ml.bag   





    @ 100 mls/hr IVPB Q8HR   





    [facility] Rx#:[id number]   


 


Output:   


 


  Emesis  200 


 


Other:   


 


  # Voids 2 1 














- Labs


CBC & Chem 7: 


                                 08/07/21 06:18





                                 08/07/21 06:18


Labs: 


                  Abnormal Lab Results - Last 24 Hours (Table)











  08/06/21 08/07/21 Range/Units





  11:05 06:18 


 


AST   190 H  (14-35)  U/L


 


ALT   401 H  (10-49)  U/L


 


Alkaline Phosphatase   136 H  ()  U/L


 


Ur Specific Gravity  >1.050 H   (1.001-1.035)  








                      Microbiology - Last 24 Hours (Table)











 08/06/21 18:20 Stool Culture - Preliminary





 Stool

## 2021-08-08 VITALS
HEART RATE: 66 BPM | RESPIRATION RATE: 16 BRPM | SYSTOLIC BLOOD PRESSURE: 112 MMHG | DIASTOLIC BLOOD PRESSURE: 73 MMHG | TEMPERATURE: 98 F

## 2021-08-08 RX ADMIN — ENOXAPARIN SODIUM SCH MG: 40 INJECTION SUBCUTANEOUS at 08:41

## 2021-08-08 RX ADMIN — HYDROCODONE BITARTRATE AND ACETAMINOPHEN PRN EACH: 5; 325 TABLET ORAL at 04:30

## 2021-08-08 RX ADMIN — ASPIRIN 81 MG CHEWABLE TABLET SCH MG: 81 TABLET CHEWABLE at 08:41

## 2021-08-08 RX ADMIN — OFLOXACIN SCH DROPS: 3 SOLUTION/ DROPS OPHTHALMIC at 08:40

## 2021-08-08 RX ADMIN — METRONIDAZOLE SCH MLS/HR: 500 INJECTION, SOLUTION INTRAVENOUS at 08:41

## 2021-08-08 RX ADMIN — KETOROLAC TROMETHAMINE SCH MG: 15 INJECTION, SOLUTION INTRAMUSCULAR; INTRAVENOUS at 05:50

## 2021-08-08 RX ADMIN — PANTOPRAZOLE SODIUM SCH MG: 40 INJECTION, POWDER, FOR SOLUTION INTRAVENOUS at 08:40

## 2021-08-08 RX ADMIN — OFLOXACIN SCH DROPS: 3 SOLUTION/ DROPS OPHTHALMIC at 08:41

## 2021-08-08 NOTE — P.PN
Subjective


Progress Note Date: 08/08/21





Patient seen and examined at bedside.  Still complains of some left upper 

quadrant soreness.  Somewhat improved from yesterday.  Tolerating diet.





Objective





- Vital Signs


Vital signs: 


                                   Vital Signs











Temp  98.0 F   08/08/21 04:26


 


Pulse  66   08/08/21 04:26


 


Resp  16   08/08/21 04:26


 


BP  112/73   08/08/21 04:26


 


Pulse Ox  96   08/08/21 04:26








                                 Intake & Output











 08/07/21 08/08/21 08/08/21





 18:59 06:59 18:59


 


Intake Total  100 


 


Balance  100 


 


Intake:   


 


  Intake, IV Titration  100 





  Amount   


 


    metroNIDAZOLE-NS   100 





    mg In Saline 1 100ml.bag   





    @ 100 mls/hr IVPB Q8HR   





    [facility] Rx#:[id number]   


 


Other:   


 


  # Voids 1 2 


 


  # Bowel Movements  0 














- Constitutional


General appearance: Present: cooperative, no acute distress





- Gastrointestinal


Gastrointestinal Comment(s): 





Soft, nontender, nondistended, no rebound, no guarding





- Psychiatric


Psychiatric: Present: A&O x's 3





- Labs


CBC & Chem 7: 


                                 08/07/21 06:18





                                 08/07/21 06:18


Labs: 


                  Abnormal Lab Results - Last 24 Hours (Table)











  08/07/21 Range/Units





  06:18 


 


AST  190 H  (14-35)  U/L


 


ALT  401 H  (10-49)  U/L


 


Alkaline Phosphatase  136 H  ()  U/L














Assessment and Plan


Plan: 





41-year-old male with recurrent hiatal hernia.  She is surgically stable for 

discharge.  He will require referral to foregut specialist based on the 

recurrent hiatal hernia and significant discomfort.  My office consent this 

referral up for him this week.  He is to call the office to set this up.  

Patient was agreeable with the plan.

## 2021-08-08 NOTE — P.DS
Providers


Date of admission: 


08/05/21 09:37





Attending physician: 


Rishabh Wheat MD





Consults: 





                                        





08/04/21 19:22


Consult Physician Urgent 


   Consulting Provider: [facility]


   Consult Reason/Comments: Chest pain


   Do you want consulting provider notified?: Yes





08/07/21 08:33


Consult Physician Routine 


   Consulting Provider: Elvia Jenkins


   Consult Reason/Comments: abd pain, failed pao


   Do you want consulting provider notified?: Yes











Primary care physician: 


Tyra Marvin





[facility] Course: 





This is a 41-year-old male patient of Dr. Marvin with past medical history of 

chronic back pain with lumbar fusion 4 years ago.  He was seen in the ER on July 29 and August 1 for back pain and diarrhea.  On July 29, back x-ray showed no 

acute abnormalities of the lumbar spine.  CAT scan of the lumbar spine on August 1 revealed posterior fusion L5-S1 with intravertebral disc spacer.  Hardware 

appears intact.  No acute fracture or traumatic subluxation.  No spinal canal 

stenosis or neural foraminal narrowing.  Patient was given Tylenol 3 and 

dexamethasone.  Patient now presented on August 4 due to chest pain that was on 

the left side radiating to his left shoulder and arm as well as left upper 

quadrant bulge.  Patient states he cannot get his left arm to relax.  He 

contacted Dr. Marvin and had an EKG done that showed changes and patient was sent

into the ER.  He states he was up crying all night due to pain.  Regarding 

diarrhea, he has had this for 7 days.  He denies any blood in his stools.  He 

denies any sick contacts he states that every time he eats or drinks something 

or goes right through him.  He does relate to being under a lot of stress.  

Patient did undergo stress echocardiogram in April of this year which revealed 

no evidence of inducible ischemia with preserved left ventricular size and 

systolic function.





Patient presented to [facility] emergency center.  He was 

afebrile, heart rate in the 80s and 90s, blood pressure 123/84, pulse ox 96% on 

room air.  EKG was a sinus rhythm.  WBC 15.6, hemoglobin 16, platelet count 356.

 Sodium 134, potassium 4.8, chloride 103, CO2 22, BUN 23 and creatinine 0.79.  

Blood sugar 121.  Magnesium 2.2, liver function tests were normal.  Troponins 

negative on 3 draws.  Triglycerides 193, cholesterol 185, LDL 88, HDL 58.


Chest x-ray showed no cardiopulmonary disease.


CTA of the chest was negative for pulmonary embolism.  Mild scarring and 

atelectasis in the left lung base similar to old exam.  No suspicious pulmonary 

mass.  Normal heart.


Patient has been seen by cardiology and was atypical chest pain for heart 

disease, while musculoskeletal chest pain and cardiology has signed off.


Due to patient's left upper quadrant tenderness and ongoing diarrhea for 1 week,

CT of the abdomen and pelvis ordered which is delayed until tomorrow due to 

earlier contrast.  Patient started on IV antibiotics for suspected colitis 





8/6: Patient states that his left upper quadrant abdominal pain is not any 

better.  His last bowel movement was before he came into the hospital.  MiraLAX 

started daily.  CAT scan of the abdomen and pelvis will be completed today.  

Repeat blood work reveals BUN 24 and creatinine 0.75.  Repeat CBC and CMP 

ordered for tomorrow.  Patient has been afebrile, heart rate 74, blood pressure 

133/85, pulse ox 95% on room air.





8/7: Continues to have significant amount of left upper quadrant pain.  Still 

has not had a bowel movement.  CT of the abdomen and pelvis impression: Moderate

size hiatal hernia.  Possible surgical material at the GE junction.  Possible 

failed Nissen fundoplication.  Hepatomegaly 18.6 cm's with hepatic steatosis.  

Redemonstrated left lower posterior thoracotomy change with some underlying 

pleural parenchymal scarring.  Surgical consult ordered.  Discussed with the 

patient that he may need to be transferred if surgical intervention is required.





8/8: Patient states that he is feeling better.  He will follow-up in outpatient 

setting for surgical intervention to help with his pain and discomfort.  

Questions were answered.  Patient has no complaint or concerns at this time.





Discharge diagnosis


1. left upper quadrant pain and tenderness with diarrhea, possible colitis.  


2.  Chest pain, musculoskeletal. 


3.  Chronic low back pain with previous lumbar fusion. 


4.  Leukocytosis most likely secondary to steroid use.


5.  Generalized anxiety disorder.





DISCHARGE disposition:


Home.





Impression and plan of care have been directed as dictated by the signing 

physician.  Dawn Gallo nurse practitioner acting as scribe for signing 

physician.





Plan - Discharge Summary


Discharge Rx Participant: No


New Discharge Prescriptions: 


New


   Pantoprazole Sodium [Protonix] 20 mg PO DAILY #30 tablet.dr





Continue


   Cyclobenzaprine [Flexeril] 10 mg PO BID PRN


     PRN Reason: Pain


   dexAMETHasone [Dexamethasone] 4 mg PO BID


Discharge Medication List





Cyclobenzaprine [Flexeril] 10 mg PO BID PRN 08/01/21 [History]


dexAMETHasone [Dexamethasone] 4 mg PO BID 08/01/21 [History]


Pantoprazole Sodium [Protonix] 20 mg PO DAILY #30 tablet. 08/08/21 [Rx]








Follow up Appointment(s)/Referral(s): 


Tyra Marvin MD [Primary Care Provider] - 1-2 days


Elvia Jenkins DO [Doctor of Osteopathic Medicine] - As Needed (Call for 

referral to foregut specialist)

## 2021-08-10 ENCOUNTER — HOSPITAL ENCOUNTER (OUTPATIENT)
Dept: HOSPITAL 47 - LABWHC1 | Age: 41
Discharge: HOME | End: 2021-08-10
Attending: FAMILY MEDICINE
Payer: COMMERCIAL

## 2021-08-10 DIAGNOSIS — N18.2: Primary | ICD-10-CM

## 2021-08-10 DIAGNOSIS — R74.01: ICD-10-CM

## 2021-08-10 DIAGNOSIS — R16.0: ICD-10-CM

## 2021-08-10 LAB
ALBUMIN SERPL-MCNC: 4.4 G/DL (ref 3.8–4.9)
ALBUMIN/GLOB SERPL: 1.63 G/DL (ref 1.6–3.17)
ALP SERPL-CCNC: 95 U/L (ref 41–126)
ALT SERPL-CCNC: 134 U/L (ref 10–49)
ANION GAP SERPL CALC-SCNC: 11 MMOL/L (ref 4–12)
AST SERPL-CCNC: 36 U/L (ref 14–35)
BASOPHILS # BLD AUTO: 0.04 X 10*3/UL (ref 0–0.1)
BASOPHILS NFR BLD AUTO: 0.4 %
BUN SERPL-SCNC: 14 MG/DL (ref 9–27)
BUN/CREAT SERPL: 15.56 RATIO (ref 12–20)
CALCIUM SPEC-MCNC: 9.8 MG/DL (ref 8.7–10.3)
CHLORIDE SERPL-SCNC: 106 MMOL/L (ref 96–109)
CO2 SERPL-SCNC: 27 MMOL/L (ref 21.6–31.8)
EOSINOPHIL # BLD AUTO: 0.14 X 10*3/UL (ref 0.04–0.35)
EOSINOPHIL NFR BLD AUTO: 1.3 %
ERYTHROCYTE [DISTWIDTH] IN BLOOD BY AUTOMATED COUNT: 5.3 X 10*6/UL (ref 4.4–5.6)
ERYTHROCYTE [DISTWIDTH] IN BLOOD: 14.1 % (ref 11.5–14.5)
GLOBULIN SER CALC-MCNC: 2.7 G/DL (ref 1.6–3.3)
GLUCOSE SERPL-MCNC: 110 MG/DL (ref 70–110)
HCT VFR BLD AUTO: 50.5 % (ref 39.6–50)
HGB BLD-MCNC: 16 G/DL (ref 13–17)
LIPASE SERPL-CCNC: 48 U/L (ref 14–60)
LYMPHOCYTES # SPEC AUTO: 2.28 X 10*3/UL (ref 0.9–5)
LYMPHOCYTES NFR SPEC AUTO: 21.7 %
MCH RBC QN AUTO: 30.2 PG (ref 27–32)
MCHC RBC AUTO-ENTMCNC: 31.7 G/DL (ref 32–37)
MCV RBC AUTO: 95.3 FL (ref 80–97)
MONOCYTES # BLD AUTO: 0.95 X 10*3/UL (ref 0.2–1)
MONOCYTES NFR BLD AUTO: 9 %
NEUTROPHILS # BLD AUTO: 7.03 X 10*3/UL (ref 1.8–7.7)
NEUTROPHILS NFR BLD AUTO: 66.9 %
PLATELET # BLD AUTO: 253 X 10*3/UL (ref 140–440)
POTASSIUM SERPL-SCNC: 4.6 MMOL/L (ref 3.5–5.5)
PROT SERPL-MCNC: 7.1 G/DL (ref 6.2–8.2)
SODIUM SERPL-SCNC: 144 MMOL/L (ref 135–145)
VIT B12 SERPL-MCNC: 449 PG/ML (ref 200–944)
WBC # BLD AUTO: 10.51 X 10*3/UL (ref 4.5–10)

## 2021-08-10 PROCEDURE — 85025 COMPLETE CBC W/AUTO DIFF WBC: CPT

## 2021-08-10 PROCEDURE — 36415 COLL VENOUS BLD VENIPUNCTURE: CPT

## 2021-08-10 PROCEDURE — 86140 C-REACTIVE PROTEIN: CPT

## 2021-08-10 PROCEDURE — 86038 ANTINUCLEAR ANTIBODIES: CPT

## 2021-08-10 PROCEDURE — 80074 ACUTE HEPATITIS PANEL: CPT

## 2021-08-10 PROCEDURE — 82306 VITAMIN D 25 HYDROXY: CPT

## 2021-08-10 PROCEDURE — 83690 ASSAY OF LIPASE: CPT

## 2021-08-10 PROCEDURE — 86039 ANTINUCLEAR ANTIBODIES (ANA): CPT

## 2021-08-10 PROCEDURE — 80053 COMPREHEN METABOLIC PANEL: CPT

## 2021-08-10 PROCEDURE — 85652 RBC SED RATE AUTOMATED: CPT

## 2021-08-10 PROCEDURE — 82607 VITAMIN B-12: CPT

## 2021-08-12 ENCOUNTER — HOSPITAL ENCOUNTER (OUTPATIENT)
Dept: HOSPITAL 47 - RADXRMAIN | Age: 41
Discharge: HOME | End: 2021-08-12
Attending: FAMILY MEDICINE
Payer: COMMERCIAL

## 2021-08-12 DIAGNOSIS — R07.81: Primary | ICD-10-CM

## 2021-08-12 PROCEDURE — 71110 X-RAY EXAM RIBS BIL 3 VIEWS: CPT

## 2021-08-12 NOTE — XR
EXAMINATION TYPE: XR ribs bilateral

 

DATE OF EXAM: 8/12/2021

 

COMPARISON: NONE

 

HISTORY: Left lateral rib pain

 

TECHNIQUE: 8 views of the ribs were obtained bilaterally

 

FINDINGS: Severe arthropathy of the left shoulder. Postsurgical clips overlying the left chest. Posts
urgical change involving the right shoulder rib cage grossly intact.

 

IMPRESSION: No acute osseous abnormality.

## 2021-08-16 ENCOUNTER — HOSPITAL ENCOUNTER (OUTPATIENT)
Dept: HOSPITAL 47 - EC | Age: 41
Setting detail: OBSERVATION
LOS: 3 days | Discharge: HOME | End: 2021-08-19
Attending: SURGERY | Admitting: SURGERY
Payer: COMMERCIAL

## 2021-08-16 DIAGNOSIS — K44.9: Primary | ICD-10-CM

## 2021-08-16 DIAGNOSIS — K21.9: ICD-10-CM

## 2021-08-16 DIAGNOSIS — G89.29: ICD-10-CM

## 2021-08-16 DIAGNOSIS — R13.10: ICD-10-CM

## 2021-08-16 DIAGNOSIS — Z79.899: ICD-10-CM

## 2021-08-16 LAB
ALBUMIN SERPL-MCNC: 4.3 G/DL (ref 3.5–5)
ALP SERPL-CCNC: 66 U/L (ref 38–126)
ALT SERPL-CCNC: 39 U/L (ref 4–49)
ANION GAP SERPL CALC-SCNC: 9 MMOL/L
APTT BLD: 23.5 SEC (ref 22–30)
AST SERPL-CCNC: 30 U/L (ref 17–59)
BASOPHILS # BLD AUTO: 0.1 K/UL (ref 0–0.2)
BASOPHILS NFR BLD AUTO: 1 %
BUN SERPL-SCNC: 17 MG/DL (ref 9–20)
CALCIUM SPEC-MCNC: 9.7 MG/DL (ref 8.4–10.2)
CHLORIDE SERPL-SCNC: 105 MMOL/L (ref 98–107)
CO2 SERPL-SCNC: 25 MMOL/L (ref 22–30)
EOSINOPHIL # BLD AUTO: 0.2 K/UL (ref 0–0.7)
EOSINOPHIL NFR BLD AUTO: 2 %
ERYTHROCYTE [DISTWIDTH] IN BLOOD BY AUTOMATED COUNT: 4.99 M/UL (ref 4.3–5.9)
ERYTHROCYTE [DISTWIDTH] IN BLOOD: 14 % (ref 11.5–15.5)
GLUCOSE SERPL-MCNC: 80 MG/DL (ref 74–99)
HCT VFR BLD AUTO: 47.6 % (ref 39–53)
HGB BLD-MCNC: 15.9 GM/DL (ref 13–17.5)
INR PPP: 0.9 (ref ?–1.2)
LIPASE SERPL-CCNC: 185 U/L (ref 23–300)
LYMPHOCYTES # SPEC AUTO: 1.9 K/UL (ref 1–4.8)
LYMPHOCYTES NFR SPEC AUTO: 26 %
MAGNESIUM SPEC-SCNC: 2 MG/DL (ref 1.6–2.3)
MCH RBC QN AUTO: 31.9 PG (ref 25–35)
MCHC RBC AUTO-ENTMCNC: 33.4 G/DL (ref 31–37)
MCV RBC AUTO: 95.3 FL (ref 80–100)
MONOCYTES # BLD AUTO: 0.6 K/UL (ref 0–1)
MONOCYTES NFR BLD AUTO: 8 %
NEUTROPHILS # BLD AUTO: 4.4 K/UL (ref 1.3–7.7)
NEUTROPHILS NFR BLD AUTO: 61 %
PLATELET # BLD AUTO: 271 K/UL (ref 150–450)
POTASSIUM SERPL-SCNC: 4.3 MMOL/L (ref 3.5–5.1)
PROT SERPL-MCNC: 7.2 G/DL (ref 6.3–8.2)
PT BLD: 9.4 SEC (ref 9–12)
SODIUM SERPL-SCNC: 139 MMOL/L (ref 137–145)
WBC # BLD AUTO: 7.1 K/UL (ref 3.8–10.6)

## 2021-08-16 PROCEDURE — 71260 CT THORAX DX C+: CPT

## 2021-08-16 PROCEDURE — 84145 PROCALCITONIN (PCT): CPT

## 2021-08-16 PROCEDURE — 85610 PROTHROMBIN TIME: CPT

## 2021-08-16 PROCEDURE — 74177 CT ABD & PELVIS W/CONTRAST: CPT

## 2021-08-16 PROCEDURE — 83735 ASSAY OF MAGNESIUM: CPT

## 2021-08-16 PROCEDURE — 71046 X-RAY EXAM CHEST 2 VIEWS: CPT

## 2021-08-16 PROCEDURE — 85379 FIBRIN DEGRADATION QUANT: CPT

## 2021-08-16 PROCEDURE — 36415 COLL VENOUS BLD VENIPUNCTURE: CPT

## 2021-08-16 PROCEDURE — 43239 EGD BIOPSY SINGLE/MULTIPLE: CPT

## 2021-08-16 PROCEDURE — 84484 ASSAY OF TROPONIN QUANT: CPT

## 2021-08-16 PROCEDURE — 96376 TX/PRO/DX INJ SAME DRUG ADON: CPT

## 2021-08-16 PROCEDURE — 96361 HYDRATE IV INFUSION ADD-ON: CPT

## 2021-08-16 PROCEDURE — 85730 THROMBOPLASTIN TIME PARTIAL: CPT

## 2021-08-16 PROCEDURE — 85025 COMPLETE CBC W/AUTO DIFF WBC: CPT

## 2021-08-16 PROCEDURE — 99285 EMERGENCY DEPT VISIT HI MDM: CPT

## 2021-08-16 PROCEDURE — 88305 TISSUE EXAM BY PATHOLOGIST: CPT

## 2021-08-16 PROCEDURE — 96374 THER/PROPH/DIAG INJ IV PUSH: CPT

## 2021-08-16 PROCEDURE — 80053 COMPREHEN METABOLIC PANEL: CPT

## 2021-08-16 PROCEDURE — 96375 TX/PRO/DX INJ NEW DRUG ADDON: CPT

## 2021-08-16 PROCEDURE — 93005 ELECTROCARDIOGRAM TRACING: CPT

## 2021-08-16 PROCEDURE — 83880 ASSAY OF NATRIURETIC PEPTIDE: CPT

## 2021-08-16 PROCEDURE — 83690 ASSAY OF LIPASE: CPT

## 2021-08-16 PROCEDURE — 74240 X-RAY XM UPR GI TRC 1CNTRST: CPT

## 2021-08-16 RX ADMIN — PANTOPRAZOLE SODIUM SCH MG: 40 INJECTION, POWDER, FOR SOLUTION INTRAVENOUS at 23:04

## 2021-08-16 RX ADMIN — CEFAZOLIN SCH MLS/HR: 330 INJECTION, POWDER, FOR SOLUTION INTRAMUSCULAR; INTRAVENOUS at 17:38

## 2021-08-16 RX ADMIN — MORPHINE SULFATE PRN MG: 4 INJECTION, SOLUTION INTRAMUSCULAR; INTRAVENOUS at 19:31

## 2021-08-16 RX ADMIN — TEMAZEPAM SCH MG: 15 CAPSULE ORAL at 21:42

## 2021-08-16 NOTE — XR
EXAMINATION TYPE: XR chest 2V

 

DATE OF EXAM: 8/16/2021

 

COMPARISON: 8/12/2021

 

HISTORY: 41-year-old male chest pain

 

TECHNIQUE:  AP and lateral views

 

FINDINGS:  

Partially visualized reversed right shoulder arthroplasty. Heart upper limits of normal size. Stable 
curvilinear scarring or atelectasis left lower lung. No consolidation or pleural effusion.

 

 

IMPRESSION:  

No acute cardiopulmonary process.

## 2021-08-16 NOTE — ED
General Adult HPI





- General


Chief complaint: Chest Pain


Stated complaint: Chest pain, LORETO


Time Seen by Provider: 21 13:14


Source: patient, RN notes reviewed, old records reviewed


Mode of arrival: wheelchair


Limitations: no limitations





- History of Present Illness


Initial comments: 





41-year-old male with central chest pain.  Symptoms have been ongoing for the 

past several days.  He was seen by his primary care physician and sent to the 

emergency department for evaluation.  He states he has a known hiatal hernia and

has had increased central chest burning however he also has sharp pain with 

inspiration.  He has had some episodes of vomiting.  He reports mild dyspnea.  

No fever.  No cough.





- Related Data


                                Home Medications











 Medication  Instructions  Recorded  Confirmed


 


Cyclobenzaprine [Flexeril] 10 mg PO BID PRN 21


 


dexAMETHasone [Dexamethasone] 4 mg PO BID 21








                                  Previous Rx's











 Medication  Instructions  Recorded


 


Pantoprazole Sodium [Protonix] 20 mg PO DAILY #30 tablet. 21











                                    Allergies











Allergy/AdvReac Type Severity Reaction Status Date / Time


 


hydromorphone [From Dilaudid] Allergy  Rash/Hives Verified 21 12:38


 


latex Allergy  Rash/Hives Verified 21 12:38


 


meperidine [From Demerol] Allergy  Rash/Hives Verified 21 12:38














Review of Systems


ROS Statement: 


Those systems with pertinent positive or pertinent negative responses have been 

documented in the HPI.





ROS Other: All systems not noted in ROS Statement are negative.





Past Medical History


Past Medical History: No Reported History


Additional Past Medical History / Comment(s): multiple dislocation of rt 

shoulder, back pain


History of Any Multi-Drug Resistant Organisms: None Reported


Past Surgical History: Back Surgery, Orthopedic Surgery


Additional Past Surgical History / Comment(s): rt shoulder


Past Psychological History: No Psychological Hx Reported


Smoking Status: Never smoker


Past Alcohol Use History: None Reported


Past Drug Use History: None Reported





- Past Family History


  ** Mother


Additional Family Medical History / Comment(s): lupus, 





  ** Father


History Unknown: Yes





  ** Brother(s)


Additional Family Medical History / Comment(s): autoimmune disorder





General Exam


Limitations: no limitations


General appearance: alert, in no apparent distress


Head exam: Present: atraumatic, normocephalic


Eye exam: Present: normal appearance, PERRL


Neck exam: Present: normal inspection.  Absent: tenderness, meningismus


Respiratory exam: Present: normal lung sounds bilaterally.  Absent: respiratory 

distress, wheezes


Cardiovascular Exam: Present: normal rhythm, tachycardia


GI/Abdominal exam: Present: soft.  Absent: distended, tenderness, guarding


Extremities exam: Present: normal inspection, normal capillary refill.  Absent: 

pedal edema, calf tenderness


Neurological exam: Present: alert, oriented X3, CN II-XII intact.  Absent: motor

sensory deficit


Psychiatric exam: Present: normal affect, normal mood


Skin exam: Present: warm, dry, intact.  Absent: cyanosis, diaphoretic





Course


                                   Vital Signs











  21





  12:35 13:38 15:00


 


Temperature 98 F  


 


Pulse Rate 107 H  102 H


 


Pulse Rate [  95 





Cardiac Monitor   





]   


 


Respiratory 18  18





Rate   


 


Blood Pressure 142/95  117/76


 


O2 Sat by Pulse 93 L  100





Oximetry   














EKG Findings





- EKG Comments:


EKG Findings:: EKG: Sinus tachycardia, rate of 102, CA interval 164, QRS 

duration 84,  no ST segment elevation





Medical Decision Making





- Medical Decision Making





41-year-old male history of hiatal hernia, presenting with epigastric pain and 

chest pain.  Patient apparently had been seen by general surgery and was 

awaiting a referral to an outside hospital for possible surgical management of 

hiatal hernia.  Due to his insurance he is unable to be seen by these 

specialists.  I discussed case with Dr. Jenkins was seen the patient previously 

also I discussed case with Dr. Cruz  who will admit this patient for 

evaluation.  He does recommend CT of the chest and pelvis with both IV and oral 

contrast.  This order has been placed.  Patient will be admitted to Dr. barnard's service for surgical evaluation.





- Lab Data


Result diagrams: 


                                 21 13:37





                                 21 13:37


                                   Lab Results











  21 Range/Units





  13:37 13:37 13:37 


 


WBC  7.1    (3.8-10.6)  k/uL


 


RBC  4.99    (4.30-5.90)  m/uL


 


Hgb  15.9    (13.0-17.5)  gm/dL


 


Hct  47.6    (39.0-53.0)  %


 


MCV  95.3    (80.0-100.0)  fL


 


MCH  31.9    (25.0-35.0)  pg


 


MCHC  33.4    (31.0-37.0)  g/dL


 


RDW  14.0    (11.5-15.5)  %


 


Plt Count  271    (150-450)  k/uL


 


MPV  7.5    


 


Neutrophils %  61    %


 


Lymphocytes %  26    %


 


Monocytes %  8    %


 


Eosinophils %  2    %


 


Basophils %  1    %


 


Neutrophils #  4.4    (1.3-7.7)  k/uL


 


Lymphocytes #  1.9    (1.0-4.8)  k/uL


 


Monocytes #  0.6    (0-1.0)  k/uL


 


Eosinophils #  0.2    (0-0.7)  k/uL


 


Basophils #  0.1    (0-0.2)  k/uL


 


PT   9.4   (9.0-12.0)  sec


 


INR   0.9   (<1.2)  


 


APTT   23.5   (22.0-30.0)  sec


 


D-Dimer   0.21   (<0.60)  mg/L FEU


 


Sodium    139  (137-145)  mmol/L


 


Potassium    4.3  (3.5-5.1)  mmol/L


 


Chloride    105  ()  mmol/L


 


Carbon Dioxide    25  (22-30)  mmol/L


 


Anion Gap    9  mmol/L


 


BUN    17  (9-20)  mg/dL


 


Creatinine    0.76  (0.66-1.25)  mg/dL


 


Est GFR (CKD-EPI)AfAm    >90  (>60 ml/min/1.73 sqM)  


 


Est GFR (CKD-EPI)NonAf    >90  (>60 ml/min/1.73 sqM)  


 


Glucose    80  (74-99)  mg/dL


 


Calcium    9.7  (8.4-10.2)  mg/dL


 


Magnesium    2.0  (1.6-2.3)  mg/dL


 


Total Bilirubin    0.2  (0.2-1.3)  mg/dL


 


AST    30  (17-59)  U/L


 


ALT    39  (4-49)  U/L


 


Alkaline Phosphatase    66  ()  U/L


 


Troponin I     (0.000-0.034)  ng/mL


 


NT-Pro-B Natriuret Pep     pg/mL


 


Total Protein    7.2  (6.3-8.2)  g/dL


 


Albumin    4.3  (3.5-5.0)  g/dL


 


Lipase    185  ()  U/L














  21 Range/Units





  13:37 13:37 


 


WBC    (3.8-10.6)  k/uL


 


RBC    (4.30-5.90)  m/uL


 


Hgb    (13.0-17.5)  gm/dL


 


Hct    (39.0-53.0)  %


 


MCV    (80.0-100.0)  fL


 


MCH    (25.0-35.0)  pg


 


MCHC    (31.0-37.0)  g/dL


 


RDW    (11.5-15.5)  %


 


Plt Count    (150-450)  k/uL


 


MPV    


 


Neutrophils %    %


 


Lymphocytes %    %


 


Monocytes %    %


 


Eosinophils %    %


 


Basophils %    %


 


Neutrophils #    (1.3-7.7)  k/uL


 


Lymphocytes #    (1.0-4.8)  k/uL


 


Monocytes #    (0-1.0)  k/uL


 


Eosinophils #    (0-0.7)  k/uL


 


Basophils #    (0-0.2)  k/uL


 


PT    (9.0-12.0)  sec


 


INR    (<1.2)  


 


APTT    (22.0-30.0)  sec


 


D-Dimer    (<0.60)  mg/L FEU


 


Sodium    (137-145)  mmol/L


 


Potassium    (3.5-5.1)  mmol/L


 


Chloride    ()  mmol/L


 


Carbon Dioxide    (22-30)  mmol/L


 


Anion Gap    mmol/L


 


BUN    (9-20)  mg/dL


 


Creatinine    (0.66-1.25)  mg/dL


 


Est GFR (CKD-EPI)AfAm    (>60 ml/min/1.73 sqM)  


 


Est GFR (CKD-EPI)NonAf    (>60 ml/min/1.73 sqM)  


 


Glucose    (74-99)  mg/dL


 


Calcium    (8.4-10.2)  mg/dL


 


Magnesium    (1.6-2.3)  mg/dL


 


Total Bilirubin    (0.2-1.3)  mg/dL


 


AST    (17-59)  U/L


 


ALT    (4-49)  U/L


 


Alkaline Phosphatase    ()  U/L


 


Troponin I  <0.012   (0.000-0.034)  ng/mL


 


NT-Pro-B Natriuret Pep   <11  pg/mL


 


Total Protein    (6.3-8.2)  g/dL


 


Albumin    (3.5-5.0)  g/dL


 


Lipase    ()  U/L














Disposition


Clinical Impression: 


 Chest pain, Hiatal hernia





Disposition: ADMITTED AS IP TO THIS HOSP


Condition: Stable


Is patient prescribed a controlled substance at d/c from ED?: No


Referrals: 


Tyra Marvin MD [Primary Care Provider] - 1-2 days


Decision to Admit Reason: Admit from [facility]


Decision Date: 21


Decision Time: 16:12

## 2021-08-16 NOTE — P.HPIM
History of Present Illness


H&P Date: 21


Chief Complaint: Chest pain with burning sensation,





THIS DICTATION IS FOR A MEDICAL CONSULT DOCUMENT





This is a 41-year-old male patient of Dr. Marvin with past medical history of 

chronic back pain with lumbar fusion 4 years ago.  He was seen in the ER on  and  for back pain and diarrhea.  On , back x-ray showed no 

acute abnormalities of the lumbar spine.  CAT scan of the lumbar spine on  revealed posterior fusion L5-S1 with intravertebral disc spacer.  Hardware 

appears intact.  No acute fracture or traumatic subluxation.  No spinal canal 

stenosis or neural foraminal narrowing.  Patient was given Tylenol 3 and dex

amethasone.  Patient now presented on  due to chest pain that was on the

left side radiating to his left shoulder and arm as well as left upper quadrant 

bulge.  Patient states he cannot get his left arm to relax.  He contacted Dr. Marvin and had an EKG done that showed changes and patient was sent into the ER. 

He states he was up crying all night due to pain.  Regarding diarrhea, he has ha

d this for 7 days.  He denies any blood in his stools.  He denies any sick 

contacts he states that every time he eats or drinks something or goes right 

through him.  He does relate to being under a lot of stress.  Patient did 

undergo stress echocardiogram in April of this year which revealed no evidence 

of inducible ischemia with preserved left ventricular size and systolic 

function.





He was last admitted, 2021, for left upper quadrant pain, tenderness or 

diarrhea, possible colitis, patient was started on anti-bike at that time, and 

cardiology has been seen the patient, with atypical symptoms, they have signed 

off.  He was seen by Dr. Keith at that time secondary to abnormal CAT scan, that

shows a large hiatal hernia, with possible failed AV his Nissen fundoplication, 

with surgical material that probably has been left behind, he does have a 

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  Approximate 6-7 years ago in [facility].  He

was done through a posterior approach, based on recommendation from Dr. Jenkins 

,he needs to be in a tertiary facility, however based on the insurance company, 

this cannot be done as it is out of network, hence the patient comes into the 

emergency room, with instructions to be seen for a follow-up with another 

surgical opinion, Dr. Cruz has accepted this patient.











Review of Systems


Constitutional: Reports as per HPI, Reports chronic pain


Cardiovascular: Reports as per HPI


Respiratory: Reports as per HPI, Denies congestion, Denies cough, Denies cough 

with sputum, Denies dyspnea, Denies excessive sputum, Denies hemoptysis, Denies 

home oxygen, Denies pain, Denies pain on inspiration, Denies pleurisy, Denies 

respiratory infections, Denies sleep apnea, Denies snoring, Denies wheezing


Gastrointestinal: Reports abdominal pain, Reports bloating, Reports dyspepsia, 

Reports early satiety, Reports indigestion


Genitourinary: Reports as per HPI, Denies decreased libido, Denies difficulties 

fathering child, Denies discharge, Denies dysuria, Denies erectile dysfunction, 

Denies flank pain, Denies genital pain, Denies genital sores, Denies hematuria, 

Denies impotence, Denies incontinence, Denies kidney stones, Denies nocturia, 

Denies polyuria, Denies testicular lump, Denies testicular pain, Denies urinary 

frequency, Denies urinary hesitancy, Denies urinary retention


Musculoskeletal: Reports as per HPI, Denies arm numbness/tingling, Denies 

atrophy, Denies fractures, Denies frequent falls, Denies gait dysfunction, 

Denies hot joints, Denies leg numbness/tingling, Denies limitation of motion, 

Denies loss of height, Denies low back pain, Denies morning stiffness, Denies 

muscle cramps, Denies muscle weakness, Denies myalgias, Denies neck pain, Denies

neck stiffness, Denies prior amputations, Denies redness of joints, Denies 

shooting arm pain, Denies shooting leg pain


Integumentary: Reports as per HPI, Denies acne, Denies boils, Denies brittle 

nails, Denies change in hair/nails, Denies color changes, Denies darkening of 

skin, Denies depigmentation, Denies dryness, Denies foot/leg ulcers, Denies 

growths, Denies hirsutism, Denies lesions, Denies onychomycosis, Denies 

pruritus, Denies rash, Denies sores, Denies striae, Denies unusual bruising, 

Denies wounds


Neurological: Reports as per HPI, Denies ataxia, Denies balance difficulties, 

Denies change in mentation, Denies headaches, Denies loss of vision, Denies 

memory loss, Denies sensory deficit, Denies syncope, Denies transient paralysis


Psychiatric: Reports as per HPI, Reports change in sleep habits, Denies 

anhedonia, Denies hallucinations, Denies irritability


Endocrine: Reports as per HPI


Hematologic/Lymphatic: Reports as per HPI, Denies thrombophilia


Allergic/Immunologic: Reports as per HPI, Denies persistent infections





Past Medical History


Past Medical History: No Reported History


Additional Past Medical History / Comment(s): multiple dislocation of rt 

shoulder, back pain


History of Any Multi-Drug Resistant Organisms: None Reported


Past Surgical History: Back Surgery, Orthopedic Surgery


Additional Past Surgical History / Comment(s): rt shoulder


Past Psychological History: No Psychological Hx Reported


Smoking Status: Never smoker


Past Alcohol Use History: None Reported


Past Drug Use History: None Reported





- Past Family History


  ** Mother


Additional Family Medical History / Comment(s): lupus, 





  ** Father


History Unknown: Yes





  ** Brother(s)


Additional Family Medical History / Comment(s): autoimmune disorder





Medications and Allergies


                                Home Medications











 Medication  Instructions  Recorded  Confirmed  Type


 


Cyclobenzaprine [Flexeril] 10 mg PO BID PRN 21 History


 


Pantoprazole Sodium [Protonix] 20 mg PO DAILY #30 tablet. 21 Rx


 


Temazepam [Restoril] 30 mg PO HS 21 History


 


traMADol HCL 50 mg PO TID PRN 21 History








                                    Allergies











Allergy/AdvReac Type Severity Reaction Status Date / Time


 


hydromorphone [From Dilaudid] Allergy  Rash/Hives Verified 21 16:23


 


latex Allergy  Rash/Hives Verified 21 16:23


 


meperidine [From Demerol] Allergy  Rash/Hives Verified 21 16:23














Physical Exam


Vitals: 


                                   Vital Signs











  Temp Pulse Pulse Resp BP Pulse Ox


 


 21 16:47   93   20  129/88  99


 


 21 15:00   102 H   18  117/76  100


 


 21 13:38    95   


 


 21 12:35  98 F  107 H   18  142/95  93 L








                                Intake and Output











 21





 06:59 14:59 22:59


 


Other:   


 


  Weight  108.862 kg 














- Constitutional


General appearance: cooperative, no acute distress





- EENT


Eyes: EOMI, PERRLA, normal appearance


ENT: NA/AT, normal oropharynx





- Neck


Neck: normal ROM





- Respiratory


Respiratory: bilateral: CTA, negative: diminished, dullness, rales





- Cardiovascular


Rhythm: regular


Heart sounds: normal: S1, S2


Abnormal Heart Sounds: no systolic murmur, no diastolic murmur, no rub, no S3 

Gallop, no S4 Gallop, no click, no other





- Gastrointestinal


General gastrointestinal: normal bowel sounds, soft





- Integumentary


Integumentary: decreased turgor, normal





- Neurologic


Neurologic: CNII-XII intact





- Musculoskeletal


Musculoskeletal: gait normal, strength equal bilaterally





- Psychiatric


Psychiatric: A&O x's 3, appropriate affect, intact judgment & insight





Results


CBC & Chem 7: 


                                 21 13:37





                                 21 13:37


Labs: 


                               Laboratory Results











WBC  7.1 k/uL (3.8-10.6)   21  13:37    


 


RBC  4.99 m/uL (4.30-5.90)   21  13:37    


 


Hgb  15.9 gm/dL (13.0-17.5)   21  13:37    


 


Hct  47.6 % (39.0-53.0)   21  13:37    


 


MCV  95.3 fL (80.0-100.0)   21  13:37    


 


MCH  31.9 pg (25.0-35.0)   21  13:37    


 


MCHC  33.4 g/dL (31.0-37.0)   21  13:37    


 


RDW  14.0 % (11.5-15.5)   21  13:37    


 


Plt Count  271 k/uL (150-450)   21  13:37    


 


MPV  7.5   21  13:37    


 


Neutrophils %  61 %  21  13:37    


 


Lymphocytes %  26 %  21  13:37    


 


Monocytes %  8 %  21  13:37    


 


Eosinophils %  2 %  21  13:37    


 


Basophils %  1 %  21  13:37    


 


Neutrophils #  4.4 k/uL (1.3-7.7)   21  13:37    


 


Lymphocytes #  1.9 k/uL (1.0-4.8)   21  13:37    


 


Monocytes #  0.6 k/uL (0-1.0)   21  13:37    


 


Eosinophils #  0.2 k/uL (0-0.7)   21  13:37    


 


Basophils #  0.1 k/uL (0-0.2)   21  13:37    


 


PT  9.4 sec (9.0-12.0)   21  13:37    


 


INR  0.9  (<1.2)   21  13:37    


 


APTT  23.5 sec (22.0-30.0)   21  13:37    


 


D-Dimer  0.21 mg/L FEU (<0.60)   21  13:37    


 


Sodium  139 mmol/L (137-145)   21  13:37    


 


Potassium  4.3 mmol/L (3.5-5.1)   21  13:37    


 


Chloride  105 mmol/L ()   21  13:37    


 


Carbon Dioxide  25 mmol/L (22-30)   21  13:37    


 


Anion Gap  9 mmol/L  21  13:37    


 


BUN  17 mg/dL (9-20)   21  13:37    


 


Creatinine  0.76 mg/dL (0.66-1.25)   21  13:37    


 


Est GFR (CKD-EPI)AfAm  >90  (>60 ml/min/1.73 sqM)   21  13:37    


 


Est GFR (CKD-EPI)NonAf  >90  (>60 ml/min/1.73 sqM)   21  13:37    


 


Glucose  80 mg/dL (74-99)   21  13:37    


 


Calcium  9.7 mg/dL (8.4-10.2)   21  13:37    


 


Magnesium  2.0 mg/dL (1.6-2.3)   21  13:37    


 


Total Bilirubin  0.2 mg/dL (0.2-1.3)   21  13:37    


 


AST  30 U/L (17-59)   21  13:37    


 


ALT  39 U/L (4-49)   21  13:37    


 


Alkaline Phosphatase  66 U/L ()   21  13:37    


 


Troponin I  <0.012 ng/mL (0.000-0.034)   21  13:37    


 


NT-Pro-B Natriuret Pep  <11 pg/mL  21  13:37    


 


Total Protein  7.2 g/dL (6.3-8.2)   21  13:37    


 


Albumin  4.3 g/dL (3.5-5.0)   21  13:37    


 


Lipase  185 U/L ()   21  13:37    














Assessment and Plan


Plan: 





1.  Persistent atypical chest pain, with dyspepsia, Large hiatal hernia, with 

abnormal CT on 2021, findings shows some surgical material noted in the GE

junction, with a moderate size hiatal hernia.  There is a left posterior 

thoracotomy change, was seen by surgery during that last admission in  

however he was referred to see a tertiary care gut specialist,  the elyssa miller, now presents to the emergency room, with a second opinion for 

which Dr. Vila has accepted the patient for evaluation.  Patient most likely 

will need an EGD, to evaluate the surgical material that is noted in the CAT 

scan, and possibly repair of a failed lap Nissen,





2.  Hepatomegaly, most likely secondary to fatty liver, liver function test is 

normal, lipase is normal adrenals and spleen and pancreas are within normal 

limits,





3.  Multiple skeletal pain from injuries in the past, chronic pain,   she does 

have back pain, and multiple dislocation of the right shoulder history of back 

surgery and orthopedic surgery, currently on Flexeril, and tramadol 50





4.  Moderate hiatal hernia, increase Protonix to 40 mg daily, check for H. 

pylori stool exam lipase normal





5.  GI prophylaxis, with Protonix 40 DVT prophylaxis with early ambulation

## 2021-08-16 NOTE — CT
EXAMINATION TYPE: CT ChestAbdPelvis w con

 

DATE OF EXAM: 8/16/2021

 

COMPARISON: CT abdomen pelvis 8/6/2021. CT chest 8/5/2021

 

HISTORY: epigastric pain

 

CT DLP: 2345.6 mGycm

Automated exposure control for dose reduction was used.

 

CONTRAST: 

Performed with IV Contrast, patient injected with 100 mL of Isovue 300.

 

Images obtained from the thoracic inlet to the floor the pelvis with IV contrast.

 

There is some mild reticular interstitial infiltrate and atelectasis at the lung bases. Heart size is
 normal. There is no mediastinal adenopathy. Thoracic aorta is intact. There are no hilar masses. The
re is no pericardial effusion.

 

There is paraesophageal hiatal hernia. Liver and spleen are intact. The bile ducts are not dilated. T
here is no pancreatic mass. Gallbladder appears absent.

 

There is no adrenal mass. Kidneys show satisfactory contrast opacification. There is no hydronephrosi
s. Ureters are not dilated. There is no retroperitoneal adenopathy. Delayed images show normal renal 
excretion. Bladder distends smoothly. There is no inguinal hernia. There is no free fluid in the pelv
is.

 

There is no mesenteric edema. There is no ascites or free air. There is no bowel obstruction. There a
ppears to BE clips from appendectomy.

 

The thoracic and lumbar vertebra appear intact. There is no compression fracture. Sternum is intact. 
There is posterior fusion surgery at L5-S1. The hip joints appear intact. There is no evidence of rib
 fracture. There is right shoulder prosthesis. There is apparent old osteotomy of the posterior left 
mid rib.

 

IMPRESSION:

There is some mild interstitial infiltrate and atelectasis at the lung bases that appears slightly in
creased compared to old exam. Old left side thoracotomy noted.

 

No acute abnormality within the abdomen pelvis. Hiatal hernia noted. Abdomen pelvis not significantly
 different than old exam.

## 2021-08-17 RX ADMIN — CEFAZOLIN SCH MLS/HR: 330 INJECTION, POWDER, FOR SOLUTION INTRAMUSCULAR; INTRAVENOUS at 05:59

## 2021-08-17 RX ADMIN — MORPHINE SULFATE PRN MG: 4 INJECTION, SOLUTION INTRAMUSCULAR; INTRAVENOUS at 22:00

## 2021-08-17 RX ADMIN — MORPHINE SULFATE PRN MG: 4 INJECTION, SOLUTION INTRAMUSCULAR; INTRAVENOUS at 05:58

## 2021-08-17 RX ADMIN — MORPHINE SULFATE PRN MG: 4 INJECTION, SOLUTION INTRAMUSCULAR; INTRAVENOUS at 00:23

## 2021-08-17 RX ADMIN — PANTOPRAZOLE SODIUM SCH MG: 40 INJECTION, POWDER, FOR SOLUTION INTRAVENOUS at 22:00

## 2021-08-17 RX ADMIN — ONDANSETRON PRN MG: 2 INJECTION INTRAMUSCULAR; INTRAVENOUS at 07:43

## 2021-08-17 RX ADMIN — CEFAZOLIN SCH MLS/HR: 330 INJECTION, POWDER, FOR SOLUTION INTRAMUSCULAR; INTRAVENOUS at 20:04

## 2021-08-17 RX ADMIN — MORPHINE SULFATE PRN MG: 4 INJECTION, SOLUTION INTRAMUSCULAR; INTRAVENOUS at 12:42

## 2021-08-17 RX ADMIN — ONDANSETRON PRN MG: 2 INJECTION INTRAMUSCULAR; INTRAVENOUS at 22:07

## 2021-08-17 RX ADMIN — TEMAZEPAM SCH MG: 15 CAPSULE ORAL at 22:00

## 2021-08-17 RX ADMIN — MORPHINE SULFATE PRN MG: 4 INJECTION, SOLUTION INTRAMUSCULAR; INTRAVENOUS at 17:55

## 2021-08-17 RX ADMIN — ONDANSETRON PRN MG: 2 INJECTION INTRAMUSCULAR; INTRAVENOUS at 01:38

## 2021-08-17 RX ADMIN — PANTOPRAZOLE SODIUM SCH MG: 40 INJECTION, POWDER, FOR SOLUTION INTRAVENOUS at 07:43

## 2021-08-17 NOTE — P.PN
Subjective


Progress Note Date: 08/17/21








This is a 41-year-old male patient of Dr. Marvin with past medical history of 

chronic back pain with lumbar fusion 4 years ago.  He was seen in the ER on July 29 and August 1 for back pain and diarrhea.  On July 29, back x-ray showed no 

acute abnormalities of the lumbar spine.  CAT scan of the lumbar spine on August 1 revealed posterior fusion L5-S1 with intravertebral disc spacer.  Hardware 

appears intact.  No acute fracture or traumatic subluxation.  No spinal canal 

stenosis or neural foraminal narrowing.  Patient was given Tylenol 3 and 

dexamethasone.  Patient now presented on August 4 due to chest pain that was on 

the left side radiating to his left shoulder and arm as well as left upper quadr

ant bulge.  Patient states he cannot get his left arm to relax.  He contacted 

Dr. Marvin and had an EKG done that showed changes and patient was sent into the 

ER.  He states he was up crying all night due to pain.  Regarding diarrhea, he 

has had this for 7 days.  He denies any blood in his stools.  He denies any sick

contacts he states that every time he eats or drinks something or goes right 

through him.  He does relate to being under a lot of stress.  Patient did 

undergo stress echocardiogram in April of this year which revealed no evidence 

of inducible ischemia with preserved left ventricular size and systolic 

function.





He was last admitted, 08/05/2021, for left upper quadrant pain, tenderness or 

diarrhea, possible colitis, patient was started on anti-bike at that time, and 

cardiology has been seen the patient, with atypical symptoms, they have signed 

off.  He was seen by Dr. Keith at that time secondary to abnormal CAT scan, that

shows a large hiatal hernia, with possible failed AV his Nissen fundoplication, 

with surgical material that probably has been left behind, he does have a 

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  Approximate 6-7 years ago in [facility].  He

was done through a posterior approach, based on recommendation from Dr. Jenkins 

,he needs to be in a tertiary facility, however based on the insurance company, 

this cannot be done as it is out of network, hence the patient comes into the 

emergency room, with instructions to be seen for a follow-up with another 

surgical opinion, Dr. Cruz has accepted this patient.








8/17.  Patient is to undergo esophagram today, and an EGD tomorrow, by Dr. Cruz, patient has vomiting episodes today, still without any food, still on 

liquid diet, however vomiting is without any food intake, epigastric and left 

rib cage pain, no melena and hematochezia, no fever, no cough.  CAT scan 

reviewed with the patient,





Objective





- Vital Signs


Vital signs: 


                                   Vital Signs











Temp  98.6 F   08/17/21 12:55


 


Pulse  82   08/17/21 12:55


 


Resp  16   08/17/21 12:55


 


BP  121/80   08/17/21 12:55


 


Pulse Ox  96   08/17/21 12:55








                                 Intake & Output











 08/16/21 08/17/21 08/17/21





 18:59 06:59 18:59


 


Weight 108.862 kg  


 


Other:   


 


  Voiding Method  Toilet 


 


  # Voids  1 3


 


  # Bowel Movements   0














- Constitutional


General appearance: Present: cooperative, no acute distress





- EENT


Eyes: Present: EOMI, PERRLA, dentition normal, normal appearance


ENT: Present: NA/AT, normal oropharynx





- Neck


Neck: Present: normal ROM





- Respiratory


Respiratory: bilateral: CTA, negative: diminished, dullness





- Cardiovascular


Rhythm: regular


Heart sounds: normal: S1, S2


Abnormal Heart Sounds: Absent: systolic murmur, diastolic murmur, rub, S3 

Gallop, S4 Gallop, click, other





- Gastrointestinal


General gastrointestinal: Present: normal bowel sounds, soft





- Integumentary


Integumentary: Present: normal, normal turgor





- Neurologic


Neurologic: Present: CNII-XII intact





- Musculoskeletal


Musculoskeletal: Present: gait normal, strength equal bilaterally





- Psychiatric


Psychiatric: Present: A&O x's 3, appropriate affect, intact judgment & insight





- Labs


CBC & Chem 7: 


                                 08/16/21 13:37





                                 08/16/21 13:37





Assessment and Plan


Plan: 





1.  Persistent atypical chest pain, with dyspepsia, Large hiatal hernia, 

intractable vomiting with abnormal CT on 08/06/2021, findings shows some 

surgical material noted in the GE junction, with a moderate size hiatal hernia. 

There is a left posterior thoracotomy change, was seen by surgery during that 

last admission in August 6 however he was referred to see a tertiary care gut 

specialist,  the insurance disallowed these, now presents to the emergency room,

with a second opinion for which Dr. Cruz has accepted the patient for 

evaluation.  Patient most likely will need an EGD, to evaluate the surgical 

material that is noted in the CAT scan, and possibly repair of a failed lap 

Nissen,.  EGD scheduled for 8/18, pending esophagram performed 817





2.  Hepatomegaly, most likely secondary to fatty liver, liver function test is 

normal, lipase is normal adrenals and spleen and pancreas are within normal 

limits,





3.  Multiple skeletal pain from injuries in the past, chronic pain,   she does 

have back pain, and multiple dislocation of the right shoulder history of back 

surgery and orthopedic surgery, currently on Flexeril, and tramadol 50





4.  Moderate hiatal hernia, increase Protonix to 40 mg daily, check for H. 

pylori stool exam lipase normal





5.  GI prophylaxis, with Protonix 40 DVT prophylaxis with early ambulation

## 2021-08-17 NOTE — FL
EXAMINATION TYPE: FL UGI w esophagus

 

DATE OF EXAM: 8/17/2021

 

COMPARISON: Correlation CT 8/16/2021

 

HISTORY: 41-year-old male paraesophageal hernia. Patient with history of hiatal hernia repair 5 years
 ago with recent recurrence of symptoms.

 

TECHNIQUE:  A double contrast esophagram and UGI study is performed.  

 

A total of 2 minutes 14 seconds of fluoroscopic time was utilized during procedure and 59 images obta
ined.

 

 

 

FINDINGS

The esophagus shows normal course, caliber, and motility. No abnormal filling defect or mucosal lesio
n is seen.

 

There is a moderate-sized hiatal hernia which seems to surround the distal esophagus. No abnormal mónica
rowing to the passage of contrast. Reflux could not be elicited with Valsalva or positional maneuvers
.

 

The stomach shows normal distensibility, peristalsis, and mucosal folds.  No evidence of any mass or 
ulcer disease.

 

The duodenal bulb, sweep, and proximal small bowel loops are unremarkable.

 

 

 

IMPRESSION:

Moderate sized hiatal hernia which appears to surround the distal esophagus. Possible moderate-sized 
herniation of a partially intact wrap. Clinically correlate. No other specific abnormality seen.

## 2021-08-17 NOTE — P.GSHP
History of Present Illness


H&P Date: 21


Chief Complaint: Abdominal pain, nausea





This a 41-year-old male who was admitted to the emergency room with complaints 

of abdominal pain and nausea.  Patient has a previous history of diaphragmatic 

hernia repair performed prostate 6 years ago [facility].  Patient had a

recurrent hiatal hernia.  He is had a recent hospital admission.  The patient's 

had a CAT scan performed showed shows no unsteady obstruction or gastric 

obstruction related to his hiatal hernia.





Past Medical History


Past Medical History: No Reported History


Additional Past Medical History / Comment(s): multiple dislocation of rt 

shoulder, back pain


History of Any Multi-Drug Resistant Organisms: None Reported


Past Surgical History: Back Surgery, Orthopedic Surgery


Additional Past Surgical History / Comment(s): rt shoulder


Past Anesthesia/Blood Transfusion Reactions: No Reported Reaction


Past Psychological History: No Psychological Hx Reported


Smoking Status: Never smoker


Past Alcohol Use History: None Reported


Past Drug Use History: None Reported





- Past Family History


  ** Mother


Additional Family Medical History / Comment(s): lupus, 





  ** Father


History Unknown: Yes





  ** Brother(s)


Additional Family Medical History / Comment(s): autoimmune disorder





Medications and Allergies


                                Home Medications











 Medication  Instructions  Recorded  Confirmed  Type


 


Cyclobenzaprine [Flexeril] 10 mg PO BID PRN 21 History


 


Pantoprazole Sodium [Protonix] 20 mg PO DAILY #30 tablet. 21 Rx


 


Temazepam [Restoril] 30 mg PO HS 21 History


 


traMADol HCL 50 mg PO TID PRN 21 History








                                    Allergies











Allergy/AdvReac Type Severity Reaction Status Date / Time


 


hydromorphone [From Dilaudid] Allergy  Rash/Hives Verified 21 16:23


 


latex Allergy  Rash/Hives Verified 21 16:23


 


meperidine [From Demerol] Allergy  Rash/Hives Verified 21 16:23














Surgical - Exam


                                   Vital Signs











Temp Pulse Resp BP Pulse Ox


 


 98 F   107 H  18   142/95   93 L


 


 21 12:35  21 12:35  21 12:35  21 12:35  21 12:35














- General


well developed, well nourished, no distress





- Eyes


PERRL





- ENT


normal pinna





- Neck


no masses





- Respiratory


normal expansion





- Cardiovascular


Rhythm: regular





- Abdomen


Abdomen: soft, non tender





Results





- Labs





                                 21 13:37





                                 21 13:37


                                 Diabetes panel











  21 Range/Units





  13:37 


 


Sodium  139  (137-145)  mmol/L


 


Potassium  4.3  (3.5-5.1)  mmol/L


 


Chloride  105  ()  mmol/L


 


Carbon Dioxide  25  (22-30)  mmol/L


 


BUN  17  (9-20)  mg/dL


 


Creatinine  0.76  (0.66-1.25)  mg/dL


 


Glucose  80  (74-99)  mg/dL


 


Calcium  9.7  (8.4-10.2)  mg/dL


 


AST  30  (17-59)  U/L


 


ALT  39  (4-49)  U/L


 


Alkaline Phosphatase  66  ()  U/L


 


Total Protein  7.2  (6.3-8.2)  g/dL


 


Albumin  4.3  (3.5-5.0)  g/dL








                                  Calcium panel











  21 Range/Units





  13:37 


 


Calcium  9.7  (8.4-10.2)  mg/dL


 


Albumin  4.3  (3.5-5.0)  g/dL








                                 Pituitary panel











  21 Range/Units





  13:37 


 


Sodium  139  (137-145)  mmol/L


 


Potassium  4.3  (3.5-5.1)  mmol/L


 


Chloride  105  ()  mmol/L


 


Carbon Dioxide  25  (22-30)  mmol/L


 


BUN  17  (9-20)  mg/dL


 


Creatinine  0.76  (0.66-1.25)  mg/dL


 


Glucose  80  (74-99)  mg/dL


 


Calcium  9.7  (8.4-10.2)  mg/dL








                                  Adrenal panel











  21 Range/Units





  13:37 


 


Sodium  139  (137-145)  mmol/L


 


Potassium  4.3  (3.5-5.1)  mmol/L


 


Chloride  105  ()  mmol/L


 


Carbon Dioxide  25  (22-30)  mmol/L


 


BUN  17  (9-20)  mg/dL


 


Creatinine  0.76  (0.66-1.25)  mg/dL


 


Glucose  80  (74-99)  mg/dL


 


Calcium  9.7  (8.4-10.2)  mg/dL


 


Total Bilirubin  0.2  (0.2-1.3)  mg/dL


 


AST  30  (17-59)  U/L


 


ALT  39  (4-49)  U/L


 


Alkaline Phosphatase  66  ()  U/L


 


Total Protein  7.2  (6.3-8.2)  g/dL


 


Albumin  4.3  (3.5-5.0)  g/dL














Assessment and Plan


Assessment: 


History of hiatal hernia.  Patient will undergo EGD tomorrow.

## 2021-08-18 VITALS — RESPIRATION RATE: 17 BRPM

## 2021-08-18 RX ADMIN — MORPHINE SULFATE PRN MG: 4 INJECTION, SOLUTION INTRAMUSCULAR; INTRAVENOUS at 23:51

## 2021-08-18 RX ADMIN — PANTOPRAZOLE SODIUM SCH MG: 40 INJECTION, POWDER, FOR SOLUTION INTRAVENOUS at 08:18

## 2021-08-18 RX ADMIN — CEFAZOLIN SCH MLS/HR: 330 INJECTION, POWDER, FOR SOLUTION INTRAMUSCULAR; INTRAVENOUS at 20:08

## 2021-08-18 RX ADMIN — ONDANSETRON PRN MG: 2 INJECTION INTRAMUSCULAR; INTRAVENOUS at 16:21

## 2021-08-18 RX ADMIN — ONDANSETRON PRN MG: 2 INJECTION INTRAMUSCULAR; INTRAVENOUS at 08:23

## 2021-08-18 RX ADMIN — MORPHINE SULFATE PRN MG: 4 INJECTION, SOLUTION INTRAMUSCULAR; INTRAVENOUS at 04:40

## 2021-08-18 RX ADMIN — MORPHINE SULFATE PRN MG: 4 INJECTION, SOLUTION INTRAMUSCULAR; INTRAVENOUS at 14:21

## 2021-08-18 RX ADMIN — CEFAZOLIN SCH MLS/HR: 330 INJECTION, POWDER, FOR SOLUTION INTRAMUSCULAR; INTRAVENOUS at 08:18

## 2021-08-18 RX ADMIN — PANTOPRAZOLE SODIUM SCH MG: 40 INJECTION, POWDER, FOR SOLUTION INTRAVENOUS at 20:06

## 2021-08-18 RX ADMIN — MORPHINE SULFATE PRN MG: 4 INJECTION, SOLUTION INTRAMUSCULAR; INTRAVENOUS at 20:06

## 2021-08-18 RX ADMIN — TEMAZEPAM SCH MG: 15 CAPSULE ORAL at 20:06

## 2021-08-18 RX ADMIN — ONDANSETRON PRN MG: 2 INJECTION INTRAMUSCULAR; INTRAVENOUS at 23:51

## 2021-08-18 RX ADMIN — MORPHINE SULFATE PRN MG: 4 INJECTION, SOLUTION INTRAMUSCULAR; INTRAVENOUS at 08:29

## 2021-08-18 NOTE — P.PN
Subjective


Progress Note Date: 08/18/21








This is a 41-year-old male patient of Dr. Marvin with past medical history of 

chronic back pain with lumbar fusion 4 years ago.  He was seen in the ER on July 29 and August 1 for back pain and diarrhea.  On July 29, back x-ray showed no 

acute abnormalities of the lumbar spine.  CAT scan of the lumbar spine on August 1 revealed posterior fusion L5-S1 with intravertebral disc spacer.  Hardware 

appears intact.  No acute fracture or traumatic subluxation.  No spinal canal 

stenosis or neural foraminal narrowing.  Patient was given Tylenol 3 and 

dexamethasone.  Patient now presented on August 4 due to chest pain that was on 

the left side radiating to his left shoulder and arm as well as left upper quadr

ant bulge.  Patient states he cannot get his left arm to relax.  He contacted 

Dr. Marvin and had an EKG done that showed changes and patient was sent into the 

ER.  He states he was up crying all night due to pain.  Regarding diarrhea, he 

has had this for 7 days.  He denies any blood in his stools.  He denies any sick

contacts he states that every time he eats or drinks something or goes right 

through him.  He does relate to being under a lot of stress.  Patient did 

undergo stress echocardiogram in April of this year which revealed no evidence 

of inducible ischemia with preserved left ventricular size and systolic 

function.





He was last admitted, 08/05/2021, for left upper quadrant pain, tenderness or 

diarrhea, possible colitis, patient was started on anti-bike at that time, and 

cardiology has been seen the patient, with atypical symptoms, they have signed 

off.  He was seen by Dr. Keith at that time secondary to abnormal CAT scan, that

shows a large hiatal hernia, with possible failed AV his Nissen fundoplication, 

with surgical material that probably has been left behind, he does have a 

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  Approximate 6-7 years ago in [facility].  He

was done through a posterior approach, based on recommendation from Dr. Jenkins 

,he needs to be in a tertiary facility, however based on the insurance company, 

this cannot be done as it is out of network, hence the patient comes into the 

emergency room, with instructions to be seen for a follow-up with another 

surgical opinion, Dr. Cruz has accepted this patient.








8/18.  Patient is scheduled  to undergo esophagram today, and an EGD tomorrow, 

by Dr. Cruz, patient has vomiting episodes today, still without any food, 

still on liquid diet, however vomiting is without any food intake, epigastric 

and left rib cage pain, no melena and hematochezia, no fever, no cough.  CAT 

scan reviewed with the patient, upper GI series, showed moderate size hiatal 

hernia, appears to surround the distal esophagus, possible moderate side 

herniation, although partially intact wrap, patient is anticipated to be 

discharged once EGD is done, also requests to have one week off work from today,

to allow recovery





Objective





- Vital Signs


Vital signs: 


                                   Vital Signs











Temp  98.1 F   08/18/21 07:00


 


Pulse  85   08/18/21 08:00


 


Resp  16   08/18/21 08:00


 


BP  133/89   08/18/21 07:00


 


Pulse Ox  98   08/18/21 07:00








                                 Intake & Output











 08/17/21 08/18/21 08/18/21





 18:59 06:59 18:59


 


Output Total  1 


 


Balance  -1 


 


Output:   


 


  Urine  1 


 


Other:   


 


  Voiding Method  Toilet Toilet


 


  # Voids 3  


 


  # Bowel Movements 0  














- Constitutional


General appearance: Present: cooperative, no acute distress, obese





- EENT


Eyes: Present: EOMI, PERRLA, dentition normal





- Neck


Neck: Present: normal ROM





- Respiratory


Respiratory: bilateral: CTA, negative: diminished, dullness





- Cardiovascular


Rhythm: regular


Heart sounds: normal: S1, S2


Abnormal Heart Sounds: Absent: systolic murmur, diastolic murmur, rub, S3 

Gallop, S4 Gallop, click, other





- Gastrointestinal


General gastrointestinal: Present: normal bowel sounds, soft





- Integumentary


Integumentary: Present: decreased turgor, normal





- Neurologic


Neurologic: Present: CNII-XII intact





- Musculoskeletal


Musculoskeletal: Present: gait normal, strength equal bilaterally





- Psychiatric


Psychiatric: Present: A&O x's 3, appropriate affect, intact judgment & insight





- Labs


CBC & Chem 7: 


                                 08/16/21 13:37





                                 08/16/21 13:37





Assessment and Plan


Plan: 





1.  Persistent atypical chest pain, with dyspepsia, Large hiatal hernia, 

intractable vomiting with abnormal CT on 08/06/2021, findings shows some 

surgical material noted in the GE junction, with a moderate size hiatal hernia. 

There is a left posterior thoracotomy change, was seen by surgery during that 

last admission in August 6 however he was referred to see a tertiary care gut 

specialist,  the insurance disallowed these, now presents to the emergency room,

with a second opinion for which Dr. Cruz has accepted the patient for 

evaluation.  Patient most likely will need an EGD, to evaluate the surgical 

material that is noted in the CAT scan, and possibly repair of a failed lap 

Nissen,.  EGD scheduled for 8/18,   esophagram shows moderate sized hiatal 

hiatal hernia, in the distal esophagus, with possible herniation of the partial 

intact wrap  





2.  Hepatomegaly, most likely secondary to fatty liver, liver function test is 

normal, lipase is normal adrenals and spleen and pancreas are within normal 

limits,





3.  Multiple skeletal pain from injuries in the past, chronic pain,   she does 

have back pain, and multiple dislocation of the right shoulder history of back 

surgery and orthopedic surgery, currently on Flexeril, and tramadol 50





4.  Moderate hiatal hernia, increase Protonix to 40 mg daily, check for H. 

pylori stool exam lipase normal





5.  GI prophylaxis, with Protonix 40 DVT prophylaxis with early ambulation

## 2021-08-18 NOTE — P.PN
Progress Note - Text


Progress Note Date: 08/18/21





Patient name stable.  His esophagram shows evidence of a paraesophageal hiatal 

hernia.





On exam her lesser stable abdomen soft.





The endoscopy unit is delayed today.  He'll have his EGD scheduled in the a.m.

## 2021-08-19 VITALS — SYSTOLIC BLOOD PRESSURE: 133 MMHG | HEART RATE: 73 BPM | DIASTOLIC BLOOD PRESSURE: 86 MMHG

## 2021-08-19 VITALS — TEMPERATURE: 97.9 F

## 2021-08-19 RX ADMIN — PANTOPRAZOLE SODIUM SCH MG: 40 INJECTION, POWDER, FOR SOLUTION INTRAVENOUS at 08:01

## 2021-08-19 RX ADMIN — MORPHINE SULFATE PRN MG: 4 INJECTION, SOLUTION INTRAMUSCULAR; INTRAVENOUS at 04:25

## 2021-08-19 RX ADMIN — CEFAZOLIN SCH MLS/HR: 330 INJECTION, POWDER, FOR SOLUTION INTRAMUSCULAR; INTRAVENOUS at 08:01

## 2021-08-19 RX ADMIN — MORPHINE SULFATE PRN MG: 4 INJECTION, SOLUTION INTRAMUSCULAR; INTRAVENOUS at 07:55

## 2021-08-19 RX ADMIN — ONDANSETRON PRN MG: 2 INJECTION INTRAMUSCULAR; INTRAVENOUS at 05:54

## 2021-08-19 NOTE — P.DS
Providers


Date of admission: 


08/16/21 16:17





Expected date of discharge: 08/19/21


Attending physician: 


Eber Cruz





Consults: 





                                        





08/17/21 11:47


Consult Physician Routine 


   Consulting Provider: Tyra Marvin


   Consult Reason/Comments: medical management


   Do you want consulting provider notified?: Already Contacted











Primary care physician: 


Tyra Marvin





[place] [facility] Course: 





Is a 41-year-old male who was admitted through the [facility] complaints of 

dysphagia and GERD.  Patient's a known paraesophageal hernia.  Patient was 

admitted receive fluid hydration.  He underwent.  Patient was discharged home 

after tolerating full liquid diet.  He'll follow-up the office


Procedures: 





EGD


Patient Condition at Discharge: Stable





Plan - Discharge Summary


Discharge Rx Participant: Yes


New Discharge Prescriptions: 


New


   Ondansetron HCl [Zofran] 4 mg PO Q8H PRN #40 tab


     PRN Reason: Nausea And Vomiting


   Citalopram Hydrobromide [CeleXA] 20 mg PO DAILY #30 tab


   Scopolamine [Scopolamine 1 MG/72 HR patch] 1 patch TRANSDERM Q72H #5 patch


   Sucralfate [Carafate] 1 gm PO ACHS #120 tablet


   HYDROcodone/APAP 7.5-325MG [Norco 7.5-325] 1 tab PO Q4-6H PRN #9 tab


     PRN Reason: Pain Control





Continue


   traMADol HCL 50 mg PO TID PRN


     PRN Reason: Pain


   Cyclobenzaprine [Flexeril] 10 mg PO BID PRN


     PRN Reason: Pain


   Temazepam [Restoril] 30 mg PO HS





Changed


   Pantoprazole Sodium [Protonix] 40 mg PO DAILY #30 tablet.dr


Discharge Medication List





Cyclobenzaprine [Flexeril] 10 mg PO BID PRN 08/01/21 [History]


Temazepam [Restoril] 30 mg PO HS 08/16/21 [History]


traMADol HCL 50 mg PO TID PRN 08/16/21 [History]


Citalopram Hydrobromide [CeleXA] 20 mg PO DAILY #30 tab 08/18/21 [Rx]


Ondansetron HCl [Zofran] 4 mg PO Q8H PRN #40 tab 08/18/21 [Rx]


Scopolamine [Scopolamine 1 MG/72 HR patch] 1 patch TRANSDERM Q72H #5 patch 

08/18/21 [Rx]


HYDROcodone/APAP 7.5-325MG [Norco 7.5-325] 1 tab PO Q4-6H PRN #9 tab 08/19/21 

[Rx]


Pantoprazole Sodium [Protonix] 40 mg PO DAILY #30 tablet.dr 08/19/21 [Rx]


Sucralfate [Carafate] 1 gm PO ACHS #120 tablet 08/19/21 [Rx]








Follow up Appointment(s)/Referral(s): 


Tyra Marvin MD [Primary Care Provider] - 1-2 days


Eber Cruz MD [STAFF PHYSICIAN] - 1 Week


Discharge Disposition: HOME SELF-CARE

## 2021-08-19 NOTE — P.OP
Date of Procedure: 08/19/21


Preoperative Diagnosis: 


GERD





Hiatal hernia


Postoperative Diagnosis: 


gerd





Paraesophageal hernia


Anesthesia: MAC


Surgeon: Eber Cruz


Pathology: other (Esophagitis  antrum)


Condition: stable


Description of Procedure: 


The patient's placed on the endoscopy table in the lateral position.  He 

received IV sedation.  The gastroscope placed oropharynx passed in the esophagus

into the stomach.  Scope was placed through the pylorus.  The first second 

portion of the duodenum appeared normal.  Scope was then brought back the antrum

was mildly inflamed.  A biopsies performed.





Scope was then retroflexed remainder stomach appeared normal.  There was a 

hiatal hernia seen.  There was evidence of paraesophageal hiatal hernia with 

stomach up against esophagus.  The scope was then brought back slowly GE 

junction this was at 38 L.  The distal esophagus.  Inflamed.  Biopsies 

performed.  The proximal esophagus.  Normal.  Scope withdrawn for patient.

## 2021-09-02 ENCOUNTER — HOSPITAL ENCOUNTER (INPATIENT)
Dept: HOSPITAL 47 - EC | Age: 41
LOS: 3 days | Discharge: HOME | DRG: 392 | End: 2021-09-05
Attending: INTERNAL MEDICINE | Admitting: INTERNAL MEDICINE
Payer: COMMERCIAL

## 2021-09-02 DIAGNOSIS — Z91.040: ICD-10-CM

## 2021-09-02 DIAGNOSIS — K44.9: Primary | ICD-10-CM

## 2021-09-02 DIAGNOSIS — Z98.1: ICD-10-CM

## 2021-09-02 DIAGNOSIS — E66.01: ICD-10-CM

## 2021-09-02 DIAGNOSIS — G89.29: ICD-10-CM

## 2021-09-02 DIAGNOSIS — R00.0: ICD-10-CM

## 2021-09-02 DIAGNOSIS — E66.9: ICD-10-CM

## 2021-09-02 DIAGNOSIS — Z90.49: ICD-10-CM

## 2021-09-02 DIAGNOSIS — R07.89: ICD-10-CM

## 2021-09-02 DIAGNOSIS — Z88.8: ICD-10-CM

## 2021-09-02 DIAGNOSIS — Z88.5: ICD-10-CM

## 2021-09-02 DIAGNOSIS — F41.1: ICD-10-CM

## 2021-09-02 DIAGNOSIS — Z79.899: ICD-10-CM

## 2021-09-02 DIAGNOSIS — K76.0: ICD-10-CM

## 2021-09-02 DIAGNOSIS — I08.1: ICD-10-CM

## 2021-09-02 LAB
ALBUMIN SERPL-MCNC: 4.3 G/DL (ref 3.5–5)
ALP SERPL-CCNC: 66 U/L (ref 38–126)
ALT SERPL-CCNC: 34 U/L (ref 4–49)
ANION GAP SERPL CALC-SCNC: 9 MMOL/L
APTT BLD: 23.8 SEC (ref 22–30)
AST SERPL-CCNC: 30 U/L (ref 17–59)
BASOPHILS # BLD AUTO: 0.1 K/UL (ref 0–0.2)
BASOPHILS NFR BLD AUTO: 1 %
BUN SERPL-SCNC: 11 MG/DL (ref 9–20)
CALCIUM SPEC-MCNC: 9.6 MG/DL (ref 8.4–10.2)
CHLORIDE SERPL-SCNC: 105 MMOL/L (ref 98–107)
CK SERPL-CCNC: 64 U/L (ref 55–170)
CO2 SERPL-SCNC: 23 MMOL/L (ref 22–30)
EOSINOPHIL # BLD AUTO: 0.2 K/UL (ref 0–0.7)
EOSINOPHIL NFR BLD AUTO: 2 %
ERYTHROCYTE [DISTWIDTH] IN BLOOD BY AUTOMATED COUNT: 4.83 M/UL (ref 4.3–5.9)
ERYTHROCYTE [DISTWIDTH] IN BLOOD: 13.2 % (ref 11.5–15.5)
GLUCOSE SERPL-MCNC: 94 MG/DL (ref 74–99)
HCT VFR BLD AUTO: 44.6 % (ref 39–53)
HGB BLD-MCNC: 15.3 GM/DL (ref 13–17.5)
INR PPP: 0.9 (ref ?–1.2)
LIPASE SERPL-CCNC: 229 U/L (ref 23–300)
LYMPHOCYTES # SPEC AUTO: 2.4 K/UL (ref 1–4.8)
LYMPHOCYTES NFR SPEC AUTO: 30 %
MAGNESIUM SPEC-SCNC: 2 MG/DL (ref 1.6–2.3)
MCH RBC QN AUTO: 31.6 PG (ref 25–35)
MCHC RBC AUTO-ENTMCNC: 34.2 G/DL (ref 31–37)
MCV RBC AUTO: 92.2 FL (ref 80–100)
MONOCYTES # BLD AUTO: 0.5 K/UL (ref 0–1)
MONOCYTES NFR BLD AUTO: 6 %
NEUTROPHILS # BLD AUTO: 4.7 K/UL (ref 1.3–7.7)
NEUTROPHILS NFR BLD AUTO: 59 %
PLATELET # BLD AUTO: 337 K/UL (ref 150–450)
POTASSIUM SERPL-SCNC: 4.3 MMOL/L (ref 3.5–5.1)
PROT SERPL-MCNC: 7.1 G/DL (ref 6.3–8.2)
PT BLD: 9.7 SEC (ref 9–12)
SODIUM SERPL-SCNC: 137 MMOL/L (ref 137–145)
WBC # BLD AUTO: 7.9 K/UL (ref 3.8–10.6)

## 2021-09-02 PROCEDURE — 93306 TTE W/DOPPLER COMPLETE: CPT

## 2021-09-02 PROCEDURE — 71260 CT THORAX DX C+: CPT

## 2021-09-02 PROCEDURE — 85730 THROMBOPLASTIN TIME PARTIAL: CPT

## 2021-09-02 PROCEDURE — 71046 X-RAY EXAM CHEST 2 VIEWS: CPT

## 2021-09-02 PROCEDURE — 85027 COMPLETE CBC AUTOMATED: CPT

## 2021-09-02 PROCEDURE — 84484 ASSAY OF TROPONIN QUANT: CPT

## 2021-09-02 PROCEDURE — 96374 THER/PROPH/DIAG INJ IV PUSH: CPT

## 2021-09-02 PROCEDURE — 80061 LIPID PANEL: CPT

## 2021-09-02 PROCEDURE — 85025 COMPLETE CBC W/AUTO DIFF WBC: CPT

## 2021-09-02 PROCEDURE — 74177 CT ABD & PELVIS W/CONTRAST: CPT

## 2021-09-02 PROCEDURE — 85610 PROTHROMBIN TIME: CPT

## 2021-09-02 PROCEDURE — 96375 TX/PRO/DX INJ NEW DRUG ADDON: CPT

## 2021-09-02 PROCEDURE — 36415 COLL VENOUS BLD VENIPUNCTURE: CPT

## 2021-09-02 PROCEDURE — 83690 ASSAY OF LIPASE: CPT

## 2021-09-02 PROCEDURE — 99285 EMERGENCY DEPT VISIT HI MDM: CPT

## 2021-09-02 PROCEDURE — 85379 FIBRIN DEGRADATION QUANT: CPT

## 2021-09-02 PROCEDURE — 83880 ASSAY OF NATRIURETIC PEPTIDE: CPT

## 2021-09-02 PROCEDURE — 83735 ASSAY OF MAGNESIUM: CPT

## 2021-09-02 PROCEDURE — 82550 ASSAY OF CK (CPK): CPT

## 2021-09-02 PROCEDURE — 93005 ELECTROCARDIOGRAM TRACING: CPT

## 2021-09-02 PROCEDURE — 80053 COMPREHEN METABOLIC PANEL: CPT

## 2021-09-02 RX ADMIN — NITROGLYCERIN SCH INCH: 20 OINTMENT TOPICAL at 19:59

## 2021-09-02 RX ADMIN — HYDROCODONE BITARTRATE AND ACETAMINOPHEN PRN EACH: 10; 325 TABLET ORAL at 20:41

## 2021-09-02 RX ADMIN — SUCRALFATE SCH GM: 1 TABLET ORAL at 19:56

## 2021-09-02 RX ADMIN — SUCRALFATE SCH: 1 TABLET ORAL at 20:40

## 2021-09-02 NOTE — XR
EXAMINATION TYPE: XR chest 2V

 

DATE OF EXAM: 9/2/2021

 

COMPARISON: Chest x-ray and CT August 16, 2021

 

HISTORY: Chest pain after vomiting.

 

TECHNIQUE:  Frontal and lateral views of the chest are obtained.

 

FINDINGS:  Persistent left basilar opacity favoring scarring and/or atelectasis.  Right lung is clear
. No pleural effusion or pneumothorax seen bilaterally. The cardiac silhouette size is stable and wit
hin normal limits. Small slice periesophageal hernia noted retrocardiac region confirmed on CT. Surgi
donna change right humeral head is redemonstrated.

 

IMPRESSION:  Chronic changes without new suspicious acute pulmonary process.

## 2021-09-02 NOTE — ED
Chest Pain HPI





- General


Chief Complaint: Chest Pain


Stated Complaint: Chest Pain


Time Seen by Provider: 21 15:15


Source: patient, RN notes reviewed


Mode of arrival: ambulatory


Limitations: no limitations





- History of Present Illness


Initial Comments: 





$2 yo male with a history of hiatal hernia  presents with C/O sharp chest this 

started 3 days ago he also states it feels like someone sitting on his chest 

radiates up into his neck to his left arm and across his chest he states nothing

seems make it better deep breathing makes it worse he does state he has a hiatal

hernia which is scheduled for repair soon.  No history of heart disease he's a 

nonsmoker.  No cough or phlegm production no fevers chills sweats.  He states 

she's been having decreased oral intake for the past couple days.  No other 

complaints or modifying factors


MD Complaint: chest pain





- Related Data


                                Home Medications











 Medication  Instructions  Recorded  Confirmed


 


Cyclobenzaprine [Flexeril] 10 mg PO BID PRN 21


 


Temazepam [Restoril] 30 mg PO HS 21


 


traMADol HCL 50 mg PO TID PRN 21


 


Dicyclomine [Bentyl] 10 mg PO TID PRN 21


 


Pantoprazole Sodium [Protonix] 20 mg PO DAILY 21








                                  Previous Rx's











 Medication  Instructions  Recorded


 


Citalopram Hydrobromide [CeleXA] 20 mg PO DAILY #30 tab 21


 


Ondansetron HCl [Zofran] 4 mg PO Q8H PRN #40 tab 21


 


Sucralfate [Carafate] 1 gm PO ACHS #120 tablet 21











                                    Allergies











Allergy/AdvReac Type Severity Reaction Status Date / Time


 


hydromorphone [From Dilaudid] Allergy  Rash/Hives Verified 21 17:17


 


latex Allergy  Rash/Hives Verified 21 17:17


 


meperidine [From Demerol] Allergy  Rash/Hives Verified 21 17:17














Review of Systems


ROS Statement: 


Those systems with pertinent positive or pertinent negative responses have been 

documented in the HPI.





ROS Other: All systems not noted in ROS Statement are negative.





EKG Findings





- EKG Results:


EKG: interpreted by ESTHER, sinus rhythm (Sinus rhythm tachycardia rate of 103.  

Interval 170 QRS duration 82 daily since /424 RS are prime or QR pattern 

in V1 may suggest right ventricular conduction delay nonspecific inferior 

configuration)





Past Medical History


Past Medical History: No Reported History


Additional Past Medical History / Comment(s): multiple dislocation of rt 

shoulder, back pain


History of Any Multi-Drug Resistant Organisms: None Reported


Past Surgical History: Back Surgery, Orthopedic Surgery


Additional Past Surgical History / Comment(s): rt shoulder


Past Anesthesia/Blood Transfusion Reactions: No Reported Reaction


Past Psychological History: No Psychological Hx Reported


Smoking Status: Never smoker


Past Alcohol Use History: None Reported


Past Drug Use History: None Reported





- Past Family History


  ** Mother


Additional Family Medical History / Comment(s): lupus, 





  ** Father


History Unknown: Yes





  ** Brother(s)


Additional Family Medical History / Comment(s): autoimmune disorder





General Exam





- General Exam Comments


Initial Comments: 





This is a well-nourished awake alert oriented times female


Limitations: no limitations


General appearance: alert, anxious, in distress


Head exam: Present: atraumatic, normocephalic, normal inspection


Eye exam: Present: normal appearance, PERRL, EOMI.  Absent: scleral icterus, 

conjunctival injection, periorbital swelling


ENT exam: Present: normal exam, mucous membranes moist


Neck exam: Present: normal inspection, full ROM, other (No stridor JVD or 

bruits).  Absent: tenderness, meningismus, lymphadenopathy


Respiratory exam: Present: normal lung sounds bilaterally.  Absent: respiratory 

distress, wheezes, rales, rhonchi, stridor


Cardiovascular Exam: Present: regular rate, normal rhythm, normal heart sounds. 

Absent: systolic murmur, diastolic murmur, rubs, gallop, clicks


GI/Abdominal exam: Present: soft, normal bowel sounds.  Absent: distended, 

tenderness, guarding, rebound, rigid


Extremities exam: Present: normal inspection, full ROM, normal capillary refill.

 Absent: tenderness, pedal edema, joint swelling, calf tenderness


Back exam: Present: normal inspection


Neurological exam: Present: alert, oriented X3, CN II-XII intact


Psychiatric exam: Present: normal affect, normal mood


Skin exam: Present: warm, dry, intact, normal color.  Absent: rash





Course


                                   Vital Signs











  21





  15:04 15:30 16:23


 


Temperature 98.3 F  98.2 F


 


Pulse Rate 104 H 98 91


 


Respiratory 18 19 16





Rate   


 


Blood Pressure 113/76 120/86 116/81


 


O2 Sat by Pulse 96 94 L 95





Oximetry   














- Reevaluation(s)


Reevaluation #1: 





21 16:13


Discussed with the patient regarding his reported ALLERGY to hydromorphone and 

meperidine reveals that he has some blotchiness to his skin when he had no 

shortness breath no overt rashes no difficulty breathing no edema no hives.  He 

was just cautioned about using this in the future.  His doctor at the time.





Chest Pain MDM





- MDM





Imaging reviewed no acute findings.  Patient had initially no relief from 

medications given and later given nitroglycerin with some improvement in his 

pain.  I did discuss findings the patient will be admitted for inpatient 

evaluation of chest pain.  Case is discussed with Dr. Pardo





Disposition


Clinical Impression: 


 Chest pain, Unstable angina pectoris, Hiatal hernia





Disposition: ADMITTED AS IP TO THIS HOSP


Condition: Fair

## 2021-09-03 LAB
ALBUMIN SERPL-MCNC: 4 G/DL (ref 3.5–5)
ALBUMIN/GLOB SERPL: 1.4 {RATIO}
ALP SERPL-CCNC: 72 U/L (ref 38–126)
ALT SERPL-CCNC: 38 U/L (ref 4–49)
ANION GAP SERPL CALC-SCNC: 6 MMOL/L
AST SERPL-CCNC: 36 U/L (ref 17–59)
BUN SERPL-SCNC: 15 MG/DL (ref 9–20)
CALCIUM SPEC-MCNC: 9.4 MG/DL (ref 8.4–10.2)
CHLORIDE SERPL-SCNC: 105 MMOL/L (ref 98–107)
CO2 SERPL-SCNC: 25 MMOL/L (ref 22–30)
ERYTHROCYTE [DISTWIDTH] IN BLOOD BY AUTOMATED COUNT: 4.71 M/UL (ref 4.3–5.9)
ERYTHROCYTE [DISTWIDTH] IN BLOOD: 14 % (ref 11.5–15.5)
GLOBULIN SER CALC-MCNC: 2.8 G/DL
GLUCOSE SERPL-MCNC: 106 MG/DL (ref 74–99)
HCT VFR BLD AUTO: 44.4 % (ref 39–53)
HGB BLD-MCNC: 14.8 GM/DL (ref 13–17.5)
MCH RBC QN AUTO: 31.4 PG (ref 25–35)
MCHC RBC AUTO-ENTMCNC: 33.3 G/DL (ref 31–37)
MCV RBC AUTO: 94.2 FL (ref 80–100)
PLATELET # BLD AUTO: 275 K/UL (ref 150–450)
POTASSIUM SERPL-SCNC: 4.2 MMOL/L (ref 3.5–5.1)
PROT SERPL-MCNC: 6.8 G/DL (ref 6.3–8.2)
SODIUM SERPL-SCNC: 136 MMOL/L (ref 137–145)
WBC # BLD AUTO: 10.7 K/UL (ref 3.8–10.6)

## 2021-09-03 RX ADMIN — CITALOPRAM HYDROBROMIDE SCH MG: 20 TABLET ORAL at 10:52

## 2021-09-03 RX ADMIN — ONDANSETRON PRN MG: 2 INJECTION INTRAMUSCULAR; INTRAVENOUS at 09:05

## 2021-09-03 RX ADMIN — NITROGLYCERIN SCH: 20 OINTMENT TOPICAL at 01:12

## 2021-09-03 RX ADMIN — SUCRALFATE SCH GM: 1 TABLET ORAL at 20:43

## 2021-09-03 RX ADMIN — TEMAZEPAM SCH MG: 15 CAPSULE ORAL at 20:43

## 2021-09-03 RX ADMIN — SUCRALFATE SCH: 1 TABLET ORAL at 13:20

## 2021-09-03 RX ADMIN — CEFAZOLIN SCH MLS/HR: 330 INJECTION, POWDER, FOR SOLUTION INTRAMUSCULAR; INTRAVENOUS at 13:55

## 2021-09-03 RX ADMIN — PANTOPRAZOLE SODIUM SCH MG: 40 INJECTION, POWDER, FOR SOLUTION INTRAVENOUS at 09:06

## 2021-09-03 RX ADMIN — ONDANSETRON PRN MG: 2 INJECTION INTRAMUSCULAR; INTRAVENOUS at 18:20

## 2021-09-03 RX ADMIN — NITROGLYCERIN SCH: 20 OINTMENT TOPICAL at 06:07

## 2021-09-03 RX ADMIN — HEPARIN SODIUM SCH UNIT: 5000 INJECTION INTRAVENOUS; SUBCUTANEOUS at 20:44

## 2021-09-03 RX ADMIN — MORPHINE SULFATE PRN MG: 2 INJECTION, SOLUTION INTRAMUSCULAR; INTRAVENOUS at 23:06

## 2021-09-03 RX ADMIN — IOPAMIDOL PRN ML: 612 INJECTION, SOLUTION INTRAVENOUS at 08:34

## 2021-09-03 RX ADMIN — SUCRALFATE SCH GM: 1 TABLET ORAL at 18:20

## 2021-09-03 RX ADMIN — HYDROCODONE BITARTRATE AND ACETAMINOPHEN PRN EACH: 10; 325 TABLET ORAL at 02:17

## 2021-09-03 RX ADMIN — CEFAZOLIN SCH MLS/HR: 330 INJECTION, POWDER, FOR SOLUTION INTRAMUSCULAR; INTRAVENOUS at 20:44

## 2021-09-03 RX ADMIN — IOPAMIDOL PRN ML: 612 INJECTION, SOLUTION INTRAVENOUS at 09:38

## 2021-09-03 RX ADMIN — MORPHINE SULFATE PRN MG: 2 INJECTION, SOLUTION INTRAMUSCULAR; INTRAVENOUS at 18:19

## 2021-09-03 RX ADMIN — MORPHINE SULFATE PRN MG: 2 INJECTION, SOLUTION INTRAMUSCULAR; INTRAVENOUS at 13:17

## 2021-09-03 RX ADMIN — MORPHINE SULFATE PRN MG: 2 INJECTION, SOLUTION INTRAMUSCULAR; INTRAVENOUS at 09:05

## 2021-09-03 RX ADMIN — SUCRALFATE SCH GM: 1 TABLET ORAL at 10:52

## 2021-09-03 NOTE — ECHOF
Referral Reason:Chest pain



MEASUREMENTS

--------

HEIGHT: 172.7 cm

WEIGHT: 111.1 kg

BP: 

IVSd:   0.8 cm     (0.6 - 1.1)

LVIDd:   4.5 cm     (3.9 - 5.3)

LVPWd:   0.9 cm     (0.6 - 1.1)

EDV(Teich):   91 ml

IVSs:   1.4 cm

LVIDs:   2.6 cm

LVPWs:   1.4 cm

%IVS Thck:   70 %

ESV(Teich):   25 ml

EF(Teich):   73 %

%FS:   42 %

SV(Teich):   66 ml

RVIDd:   3.7 cm     (< 3.3)

Ao Diam:   3.3 cm     (2.0 - 3.7)

LA Diam:   3.9 cm     (2.7 - 3.8)

AV Cusp:   2.0 cm     (1.5 - 2.6)

EPSS:   0.6 cm

MV E Patrick:   0.64 m/s

MV DecT:   238 ms

MV Dec [place]:   2.7 m/s

MV A Patrick:   0.78 m/s

MV E/A Ratio:   0.82 

MV PHT:   69 ms

TR Vmax:   1.55 m/s

TR maxP.56 mmHg

RAP:   5.00 mmHg

RVSP:   14.56 mmHg

MV EF SLOPE:   75.23 mm/s     (70 - 150)

MV EXCURSION:   14.92 mm     (> 18.000)







FINDINGS

--------

Sinus rhythm.

This was a technically good study.

The left ventricular size is normal.   There is borderline concentric left ventricular hypertrophy.  
 Overall left ventricular systolic function is low-normal with, an EF between 50 - 55 %.

The right ventricle is moderate to  severely enlarged.

The left atrial size is normal.

The right atrial size is normal.

The aortic valve is trileaflet, and appears structurally normal. No aortic stenosis or regurgitation.


Mild mitral regurgitation is present.

Mild tricuspid regurgitation present.   Right ventricular systolic pressure is normal at < 35 mmHg.

There is no pulmonic regurgitation present.

The aortic root size is normal.

There is no pericardial effusion.



CONCLUSIONS

--------

1. The left ventricular size is normal.

2. There is borderline concentric left ventricular hypertrophy.

3. Overall left ventricular systolic function is low-normal with, an EF between 50 - 55 %.

4. The right ventricle is moderate to  severely enlarged.

5. The left atrial size is normal.

6. The right atrial size is normal.

7. The aortic valve is trileaflet, and appears structurally normal. No aortic stenosis or regurgitati
on.

8. Mild mitral regurgitation is present.

9. Mild tricuspid regurgitation present.

10. The aortic root size is normal.

11. There is no pericardial effusion.





SONOGRAPHER: Lani Diana RDCS

## 2021-09-03 NOTE — CT
EXAMINATION TYPE: CT ChestAbdPelvis w con

 

DATE OF EXAM: 9/3/2021

 

COMPARISON: 8/16/2021

 

HISTORY: Left sided upper abdominal and chest pain

 

CT DLP: 2437 mGycm

 

CONTRAST: 

CT scan of the chest, abdomen and pelvis is performed with Oral Contrast and with IV Contrast, patien
t injected with 100 mL of Isovue 300.

 

CT  Chest:

LUNGS: Thoracotomy changes redemonstrated left lower lobe pleural-parenchymal thickening. No signific
ant change appreciated. The lungs are otherwise clear. No pulmonary nodule or mass is detected.  No p
leural effusion or CT evidence of interstitial lung disease.

 

MEDIASTINUM:  Thoracic aorta is of normal caliber.  The heart is not enlarged.  No evidence for media
stinal mass or adenopathy.

 

HILAR STRUCTURES: No evidence for mass.  No hilar adenopathy is appreciated.

 

OTHER: No significant abnormality.

 

CONTRAST CT ABDOMEN AND PELVIS FINDINGS: 

 

LIVER/GB: The gallbladder is surgically absent.

 

Hepatomegaly with hepatic steatosis. No space occupying hepatic lesion. Biliary tree is of normal donna
iber. 

 

PANCREAS:  No inflammation.  No distinct mass. 

 

SPLEEN:  No splenic enlargement.  No lesion seen. 

 

ADRENALS:  No nodule.  No thickening. 

 

KIDNEYS/BLADDER:  No hydronephrosis.  No nephrolithiasis.  No distinct renal mass. 

 

BOWEL: Normal appendix.  Normal bowel caliber.  No inflammation. Moderate fixed hiatal hernia redemon
strated. Surgical clips noted.

 

GENITAL ORGANS:  No gross abnormality. 

 

LYMPH NODES:  No greater than 1cm abdominal or pelvic lymph nodes are appreciated.

 

AORTA: No significant abnormality. 

 

OSSEOUS STRUCTURES: Postoperative changes lumbar spine.

 

OTHER:  No significant additional abnormality is seen.  

 

IMPRESSION: 

1. No significant change in virtually prior study.

2. Moderate fixed hiatal hernia.

3. Thoracotomy changes left lower lobe.

4. Hepatomegaly with hepatic steatosis.

## 2021-09-03 NOTE — P.HPIM
History of Present Illness


H&P Date: 21





HISTORY OF PRESENT ILLNESS


This is a 41-year-old male patient of Dr. Marvin with past medical history of 

chronic back pain with lumbar fusion 4 years ago.  Patient with history of 

diaphragmatic hernia repair that was completed at [facility] 6 years

ago and is known to have recurrent hiatal hernia with plan for surgical 

intervention on September 10 with Dr. Cruz.  Patient presented to hospital 

with epigastric pain and chest pain.  Troponins of the negative on 3 draws.  

Patient has been seen by cardiology who an acute coronary syndrome has been 

ruled out.  Heparin drip was subsequently discontinued.  Consult in place with 

general surgery regarding abdominal pain.  Dulcolax suppository has been ordered

but patient is refusing.  He denies having any fever, no cough.  Pain is in the 

epigastric area.  He has not been eating and is complaints of vomiting.





Patient presented to [facility] emergency center.  He was 

afebrile, heart rate 104, blood pressure 113/76, pulse ox 96% on room air.  EKG 

sinus tachycardia with no acute ST changes.  CBC was unremarkable.  D-dimer 0.4.

 CMP all within normal limits.  Troponin negative on 3 draws.  Lipase 229.


Chest x-ray showed no cardiopulmonary disease.


CAT scan of the chest abdomen and pelvis revealed no significant change.  

Moderate fixed hiatal hernia.  Thoracotomy changes in the left lower lobe.  

Hepatomegaly with hepatic steatosis.  


Echocardiogram reveals EF of 50-55% with borderline concentric left hypertrophy,

mild mitral regurgitation, mild tricuspid regurgitation..


Patient has been seen by cardiology and was atypical chest pain for heart 

disease, and cardiology has signed off.


Patient admitted to the [facility] floor.





REVIEW OF SYSTEMS


Constitutional: No fever, no chills, no night sweats.  No weight change.  No 

weakness, fatigue or lethargy.  No daytime sleepiness.


EENT: No headache.  No blurred vision or double vision, no loss of vision.  No 

loss of Hearing, no ringing in the ears, no dizziness.  No nasal drainage or 

congestion.  No epistaxis.  No sore throat.


Lungs: No shortness of breath, cough, no sputum production.  No wheezing.


Cardiovascular: Reports chest pain, no lower extremity edema.  No palpitations. 

No paroxysmal nocturnal dyspnea.  No orthopnea.  No lightheadedness or 

dizziness.  No syncopal episodes.


Abdominal: Reports abdominal pain.  No nausea, vomiting.  Reports diarrhea.  No 

constipation.  No bloody or tarry stools. Reports loss of appetite.


Genitourinary: No dysuria, increased frequency, urgency.  No urinary retention.


Musculoskeletal: No myalgias.  No muscle weakness, no gait dysfunction, no 

frequent falls.  No back pain.  No neck pain.


Integumentary: No wounds, no lesions.  No rash or pruritus.  No unusual br

uising.  No change in hair or nails.


Neurologic: No aphasia. No facial droop. No change in mentation. No head injury.

No headache. No paralysis. No paresthesia.


Psychiatric: No depression.  Reports anxiety.  No mood swings.


Endocrine: No abnormal blood sugars.  No weight change.  No excessive sweating 

or thirst.  No cold intolerance.  





SOCIAL HISTORY


Patient is a lifelong nonsmoker, no illicit drug use, marijuana use or alcohol 

use.  Patient is  and lives alone.  





FAMILY HISTORY


Mother  at age 59 from lupus.  Patient does not know his father.  Patient 

has one brother with autoimmune disorder.  Patient does not have any sisters.  





PHYSICAL EXAMINATION


Gen: This brodie 41-year-old obese male seen sitting on the ER stretcher, eating 

eggs but states he does not have any appetite.


EENT: Head is atraumatic, normocephalic. Pupils equal, round. Sclerae is 

anicteric. 


NECK: Supple. No JVD. No lymphadenopathy. No thyromegaly. 


LUNGS: Clear to auscultation. No wheezes or rhonchi.  No intercostal 

retractions.


HEART: Regular rate and rhythm. No murmur. 


ABDOMEN: Soft. Bowel sounds are present. No masses. Upper quadrantnt tenderness.


EXTREMITIES: No pedal edema.  No calf tenderness.


NEUROLOGICAL: Patient is awake, alert and oriented x3. Cranial nerves 2 through 

12 are grossly intact. 





ASSESSMENT AND PLAN


1.  Abdominal pain with recurrent hiatal hernia with previous history of di

aphragmatic hernia repair at [facility].  General surgery consult, 

Dulcolax suppository was refused by the patient, gastric emptying study ordered,

full liquid diet.  Continue tramadol 50 mg 3 times daily as needed, morphine 2 

mg IV push every 4 hours as needed for pain, Zofran as needed for nausea, 

scopolamine patch for nausea and vomiting.  Continue incentive spirometry to 

reduce incidence of atelectasis and hospital acquired pneumonia.  





2.  Chest pain, musculoskeletal.  Cardiology consult appreciated.  Cardiology 

has signed off.





3.  Chronic low back pain with previous lumbar fusion.  Continue Flexeril 10 mg 

twice daily as needed.





4.   Generalized anxiety disorder.





5.  GI prophylaxis.  Protonix 40 mg IV push daily.





6.  DVT prophylaxiHeparin subcu.





Patient will be admitted to the hospital for a minimum of 2 night stay.





DISCHARGE PLAN


Home.





Impression and plan of care have been directed as dictated by the signing 

physician.  Daya Rubio nurse practitioner acting as scribe for signing 

physician.





Past Medical History


Past Medical History: No Reported History


Additional Past Medical History / Comment(s): multiple dislocation of rt 

shoulder, back pain


History of Any Multi-Drug Resistant Organisms: None Reported


Past Surgical History: Back Surgery, Orthopedic Surgery


Additional Past Surgical History / Comment(s): rt shoulder


Past Anesthesia/Blood Transfusion Reactions: No Reported Reaction


Past Psychological History: No Psychological Hx Reported


Smoking Status: Never smoker


Past Alcohol Use History: None Reported


Past Drug Use History: None Reported





- Past Family History


  ** Mother


Additional Family Medical History / Comment(s): lupus, 





  ** Father


History Unknown: Yes





  ** Brother(s)


Additional Family Medical History / Comment(s): autoimmune disorder





Medications and Allergies


                                Home Medications











 Medication  Instructions  Recorded  Confirmed  Type


 


Cyclobenzaprine [Flexeril] 10 mg PO BID PRN 21 History


 


Temazepam [Restoril] 30 mg PO HS 21 History


 


traMADol HCL 50 mg PO TID PRN 21 History


 


Citalopram Hydrobromide [CeleXA] 20 mg PO DAILY #30 tab 21 Rx


 


Ondansetron HCl [Zofran] 4 mg PO Q8H PRN #40 tab 21 Rx


 


Sucralfate [Carafate] 1 gm PO ACHS #120 tablet 21 Rx


 


Dicyclomine [Bentyl] 10 mg PO TID PRN 21 History


 


Pantoprazole Sodium [Protonix] 20 mg PO DAILY 21 History








                                    Allergies











Allergy/AdvReac Type Severity Reaction Status Date / Time


 


hydromorphone [From Dilaudid] Allergy  Rash/Hives Verified 21 17:17


 


latex Allergy  Rash/Hives Verified 21 17:17


 


meperidine [From Demerol] Allergy  Rash/Hives Verified 21 17:17














Physical Exam


Vitals: 


                                   Vital Signs











  Temp Pulse Pulse Resp BP BP Pulse Ox


 


 21 07:00  98.1 F   73  20   100/61  90 L


 


 21 02:25  97.4 F L   88  16   115/70  96


 


 21 02:00     16   


 


 21 20:03  98.3 F   84  16   117/83  97


 


 21 19:04  98.2 F  78   16  128/74   94 L


 


 21 19:00   87   16  127/94   94 L


 


 21 17:00   87   16  120/74   93 L


 


 21 16:23  98.2 F  91   16  116/81   95


 


 21 15:30   98   19  120/86   94 L


 


 21 15:04  98.3 F  104 H   18  113/76   96








                                Intake and Output











 21





 22:59 06:59 14:59


 


Intake Total  123.488 


 


Balance  123.488 


 


Intake:   


 


  Intake, IV Titration  123.488 





  Amount   


 


    Heparin Sod,Pork in 0.45%  123.488 





    NaCl 25,000 unit In 0.45   





    % NaCl 1 250ml.bag @ 8.   





    998 UNITS/KG/HR 9.999 mls   





    /hr IV .Q24H [facility] Rx#:   





    [id number]   


 


Other:   


 


  Voiding Method  Toilet 


 


  # Voids 2 2 


 


  Weight 111.13 kg  














Results


CBC & Chem 7: 


                                 21 04:35





                                 21 04:35


Labs: 


                  Abnormal Lab Results - Last 24 Hours (Table)











  21 Range/Units





  04:35 04:35 


 


WBC   10.7 H  (3.8-10.6)  k/uL


 


Sodium  136 L   (137-145)  mmol/L


 


Glucose  106 H   (74-99)  mg/dL














Thrombosis Risk Factor Assmnt





- Choose All That Apply


Any of the Below Risk Factors Present?: Yes


Each Factor Represents 1 point: Age 41-60 years, Obesity (BMI >25)


Other Risk Factors: No


Other congenital or acquired thrombophilia - If yes, enter type in comment: No


Thrombosis Risk Factor Assessment Total Risk Factor Score: 2


Thrombosis Risk Factor Assessment Level: Low Risk

## 2021-09-03 NOTE — P.CRDCN
History of Present Illness


Consult date: 21


History of present illness: 





HISTORY OF PRESENT ILLNESS:  





This is a 41-year-old male with a past medical history significant for recent 

diagnosis of paraesophageal hernia and obesity. Patient does not follow with a 

cardiologist and denies any previous cardiac history. We have been asked to see 

the patient in consultation for chest pain. Patient examined at the bedside.  

Patient states the last 3 weeks he has been having intermittent chest disco

mfort.  He denies any radiation of the pain or any shortness of breath.  He 

reports generalized malaise.  He states he has been nauseated and has a 

difficult time eating.





EKG reveals sinus tachycardia with no signs of acute ischemia


Chest xray chronic changes without new suspicious acute pulmonary process


Laboratory data: WBC 10.7.  Hemoglobin 14.8.  Platelet count 275.  D-dimer 0.40.

 Sodium 136.  Potassium 4.2.  BUN 15.  Creatinine 0.92.  Magnesium 2.0.


Current home cardiac medications include none





REVIEW OF SYSTEMS: 


At the time of my exam:


CONSTITUTIONAL: Denies fever or chills.


HEENT: Denies blurred vision, vision changes, or eye pain. Denies hemoptysis 


CARDIOVASCULAR: Denies chest pain. Denies orthopnea. Denies PND. Denies 

palpitations


RESPIRATORY: Denies shortness of breath. 


GASTROINTESTINAL: Denies abdominal pain. Denies nausea or vomiting. 


HEMATOLOGIC: Denies bleeding disorders.


GENITOURINARY:  Denies any blood in urine.


SKIN: Denies pruitis. Denies rash.





PHYSICAL EXAM: 


VITAL SIGNS: Reviewed.


GENERAL: Well-developed in no acute distress. 


HEENT: Head is normocephalic. Pupils are equal, round. Sclerae anicteric. Mucous

membranes of the mouth are moist. Neck supple. No JVD or thyromegaly


LUNGS: Respirations even and unlabored. Lungs essentially clear to auscultation 

bilaterally.


HEART: Regular rate and rhythm.  S1 and S2 heard.


ABDOMEN: Soft. Nondistended. Nontender.


EXTREMITIES: Normal range of motion.  No clubbing or cyanosis.  Peripheral 

pulses intact.  No lower extremity edema


NEUROLOGIC: Awake and alert. Oriented x 3. 





ASSESSMENT: 


Chest pain, atypical, troponin negative 3 


Paraesophageal hernia


Obesity





PLAN: 


An acute coronary event has been ruled out


Obtain 2-D echo to assess cardiac structure and function


Discontinue IV heparin and Nitropaste


Gen. surgery consulted for further evaluation


Further recommendations pending patient course








Nurse practitioner note has been reviewed by physician. Signing provider agrees 

with the documented findings, assessment, and plan of care. 








Past Medical History


Past Medical History: No Reported History


Additional Past Medical History / Comment(s): multiple dislocation of rt 

shoulder, back pain


History of Any Multi-Drug Resistant Organisms: None Reported


Past Surgical History: Back Surgery, Orthopedic Surgery


Additional Past Surgical History / Comment(s): rt shoulder


Past Anesthesia/Blood Transfusion Reactions: No Reported Reaction


Past Psychological History: No Psychological Hx Reported


Smoking Status: Never smoker


Past Alcohol Use History: None Reported


Past Drug Use History: None Reported





- Past Family History


  ** Mother


Additional Family Medical History / Comment(s): lupus, 





  ** Father


History Unknown: Yes





  ** Brother(s)


Additional Family Medical History / Comment(s): autoimmune disorder





Medications and Allergies


                                Home Medications











 Medication  Instructions  Recorded  Confirmed  Type


 


Cyclobenzaprine [Flexeril] 10 mg PO BID PRN 21 History


 


Temazepam [Restoril] 30 mg PO HS 21 History


 


traMADol HCL 50 mg PO TID PRN 21 History


 


Citalopram Hydrobromide [CeleXA] 20 mg PO DAILY #30 tab 21 Rx


 


Ondansetron HCl [Zofran] 4 mg PO Q8H PRN #40 tab 21 Rx


 


Sucralfate [Carafate] 1 gm PO ACHS #120 tablet 21 Rx


 


Dicyclomine [Bentyl] 10 mg PO TID PRN 21 History


 


Pantoprazole Sodium [Protonix] 20 mg PO DAILY 21 History








                                    Allergies











Allergy/AdvReac Type Severity Reaction Status Date / Time


 


hydromorphone [From Dilaudid] Allergy  Rash/Hives Verified 21 17:17


 


latex Allergy  Rash/Hives Verified 21 17:17


 


meperidine [From Demerol] Allergy  Rash/Hives Verified 21 17:17














Physical Exam


Vitals: 


                                   Vital Signs











  Temp Pulse Pulse Resp BP BP Pulse Ox


 


 21 07:00  98.1 F   73  20   100/61  90 L


 


 21 02:25  97.4 F L   88  16   115/70  96


 


 21 02:00     16   


 


 21 20:03  98.3 F   84  16   117/83  97


 


 21 19:04  98.2 F  78   16  128/74   94 L


 


 21 19:00   87   16  127/94   94 L


 


 21 17:00   87   16  120/74   93 L


 


 21 16:23  98.2 F  91   16  116/81   95


 


 21 15:30   98   19  120/86   94 L


 


 21 15:04  98.3 F  104 H   18  113/76   96








                                Intake and Output











 21





 22:59 06:59 14:59


 


Intake Total  123.488 


 


Balance  123.488 


 


Intake:   


 


  Intake, IV Titration  123.488 





  Amount   


 


    Heparin Sod,Pork in 0.45%  123.488 





    NaCl 25,000 unit In 0.45   





    % NaCl 1 250ml.bag @ 8.   





    998 UNITS/KG/HR 9.999 mls   





    /hr IV .Q24H [facility] Rx#:   





    [id number]   


 


Other:   


 


  Voiding Method  Toilet 


 


  # Voids 2 2 


 


  Weight 111.13 kg  














Results





                                 21 04:35





                                 21 04:35


                                 Cardiac Enzymes











  21 Range/Units





  15:28 15:28 19:35 


 


AST  30    (17-59)  U/L


 


Troponin I   <0.012  <0.012  (0.000-0.034)  ng/mL














  21 Range/Units





  22:38 04:35 


 


AST   36  (17-59)  U/L


 


Troponin I  <0.012   (0.000-0.034)  ng/mL








                                   Coagulation











  21 Range/Units





  15:28 04:35 


 


PT  9.7   (9.0-12.0)  sec


 


APTT  23.8  29.0  (22.0-30.0)  sec








                                       CBC











  21 Range/Units





  15:28 04:35 


 


WBC  7.9  10.7 H  (3.8-10.6)  k/uL


 


RBC  4.83  4.71  (4.30-5.90)  m/uL


 


Hgb  15.3  14.8  (13.0-17.5)  gm/dL


 


Hct  44.6  44.4  (39.0-53.0)  %


 


Plt Count  337  275  (150-450)  k/uL








                          Comprehensive Metabolic Panel











  21 Range/Units





  15:28 04:35 


 


Sodium  137  136 L  (137-145)  mmol/L


 


Potassium  4.3  4.2  (3.5-5.1)  mmol/L


 


Chloride  105  105  ()  mmol/L


 


Carbon Dioxide  23  25  (22-30)  mmol/L


 


BUN  11  15  (9-20)  mg/dL


 


Creatinine  0.80  0.92  (0.66-1.25)  mg/dL


 


Glucose  94  106 H  (74-99)  mg/dL


 


Calcium  9.6  9.4  (8.4-10.2)  mg/dL


 


AST  30  36  (17-59)  U/L


 


ALT  34  38  (4-49)  U/L


 


Alkaline Phosphatase  66  72  ()  U/L


 


Total Protein  7.1  6.8  (6.3-8.2)  g/dL


 


Albumin  4.3  4.0  (3.5-5.0)  g/dL








                               Current Medications











Generic Name Dose Route Start Last Admin





  Trade Name Freq  PRN Reason Stop Dose Admin


 


Hydrocodone Bitart/Acetaminophen  1 each  21 17:12  21 17:53





  Hydrocodone/Apap 7.5-325mg 1 Each Tab  PO   1 each





  Q4H PRN   Administration





  Pain Control  


 


Hydrocodone Bitart/Acetaminophen  1 each  21 20:24  21 02:17





  Hydrocodone/Apap 10-325mg 1 Each Tab  PO   1 each





  Q6HR PRN   Administration





  Pain  


 


Aspirin  325 mg  21 09:00 





  Aspirin 325 Mg Tab  PO  





  DAILY TYLER  


 


Atorvastatin Calcium  80 mg  21 09:00 





  Atorvastatin 80 Mg Tab  PO  





  DAILY TYLER  


 


Citalopram Hydrobromide  20 mg  21 09:00 





  Citalopram Hydrobromide 20 Mg Tab  PO  





  DAILY TYLER  


 


Cyclobenzaprine HCl  10 mg  21 17:12 





  Cyclobenzaprine 10 Mg Tab  PO  





  BID PRN  





  Pain  


 


Dicyclomine HCl  10 mg  21 21:30 





  Dicyclomine 10 Mg Cap  PO  





  TID PRN  





  IBS  


 


Heparin Sodium (Porcine)  0 unit  21 05:26  21 06:10





  Heparin Sodium 1,000 Un/Ml (10ml Vl)  IV   4,000 unit





  PER PROTOCOL PRN   Administration





  Low PTT  





  Protocol  


 


Heparin Sodium/Sodium Chloride  250 mls @ 9.999 mls/hr  21 17:15  21

06:11





  25,000 unit/ Sodium Chloride  IV   11.998 units/kg/hr





  .Q24H TYLER   13.333 mls/hr





    Titration





  Protocol  





  8.998 UNITS/KG/HR  


 


Iopamidol  30 ml  21 08:16  21 08:34





  Iopamidol Contrast (Oral Use) Vial  PO  21 08:17  30 ml





  Q60M PRN   Administration





  CT Scan  


 


Morphine Sulfate  2 mg  21 08:52 





  Morphine Sulfate 2 Mg/Ml Syringe  IVP  





  Q4HR PRN  





  Pain/Discomfort  


 


Nitroglycerin  0.4 mg  21 17:10 





  Nitroglycerin Sl Tabs 0.4 Mg Tab  SUBLINGUAL  





  Q5M PRN  





  Chest Pain  


 


Nitroglycerin  1 inch  21 18:00  21 06:07





  Nitroglycerin Oint 1 Inch/Gm Packet  TOPICAL   Not Given





  Q6HR TYLER  


 


Ondansetron HCl  4 mg  21 17:12  21 02:30





  Ondansetron 4 Mg Tab  PO   4 mg





  Q8H PRN   Administration





  Nausea And Vomiting  


 


Ondansetron HCl  4 mg  21 08:52 





  Ondansetron 4 Mg/2 Ml Vial  IVP  





  Q6HR PRN  





  Nausea And Vomiting  


 


Pantoprazole Sodium  40 mg  21 09:00 





  Pantoprazole 40 Mg/10 Ml Vial  IVP  





  DAILY TYLER  


 


Scopolamine  1 patch  21 09:00 





  Scopolamine 1.5mg/72hr Patch  TRANSDERM  





  Q72H TYLER  


 


Sucralfate  1 gm  21 17:30  21 20:40





  Sucralfate 1 Gm Tab  PO   Not Given





  [facility] TYLER  


 


Temazepam  30 mg  21 21:00  21 20:41





  Temazepam 30 Mg Cap  PO   30 mg





  [facility] TYLER   Administration


 


Tramadol HCl  50 mg  21 17:12 





  Tramadol 50 Mg Tab  PO  





  TID PRN  





  Pain  








                                Intake and Output











 21





 22:59 06:59 14:59


 


Intake Total  123.488 


 


Balance  123.488 


 


Intake:   


 


  Intake, IV Titration  123.488 





  Amount   


 


    Heparin Sod,Pork in 0.45%  123.488 





    NaCl 25,000 unit In 0.45   





    % NaCl 1 250ml.bag @ 8.   





    998 UNITS/KG/HR 9.999 mls   





    /hr IV .Q24H [facility] Rx#:   





    [id number]   


 


Other:   


 


  Voiding Method  Toilet 


 


  # Voids 2 2 


 


  Weight 111.13 kg  








                                        





                                 21 04:35 





                                 21 04:35

## 2021-09-03 NOTE — P.GSCN
History of Present Illness


Consult date: 21


History of present illness: 





CHIEF COMPLAINT: Epigastric pain





HISTORY OF PRESENT ILLNESS: This is a 41-year-old male with a known history of 

diaphragmatic hernia repair that was completed at [facility] about 6

years ago.  He has a known recurrent hiatal hernia.  Patient also has a prior 

history of a cholecystectomy.  Patient presented to the hospital complaints of 

epigastric abdominal pain and chest pain.  His troponins were negative 3 sets. 

He's been seen by a cardiology is no evidence of any acute coronary event.  They

discontinue patient's IV heparin.  Surgical services consult in regards to 

patient's epigastric abdominal pain and nausea and vomiting.  Patient reports 

that he is feeling bloated has had recurrent nausea and vomiting.  It was sc

heduled for a Nissen fundoplication next Friday with Dr. berry outpatient.  

Patient has computed tomography scan of the chest abdomen and pelvis showing no 

significant change.  Moderate fixed hiatal hernia.  Thoracotomy changes left 

lower lobe.  Hepatomegaly with hepatic steatosis.  Patient denies any fever 

chills or sweats.  Denies any change in bowel movements.





Patient seen and examined with Dr. berry





PAST MEDICAL HISTORY: 


See list.





PAST SURGICAL HISTORY: 


See list.





MEDICATIONS: 


See list.





ALLERGIES: 


See list.





SOCIAL HISTORY: No illicit drug use.  





REVIEW OF SYSTEMS: 


CONSTITUTIONAL: Denies fever or chills.


HEENT: Denies blurred vision, vision changes, or eye pain. Denies hemoptysis 


CARDIOVASCULAR: Denies chest pain or pressure.


RESPIRATORY: No shortness of breath. 


GASTROINTESTINAL: See HPI for pertinent findings


HEMATOLOGIC: Denies bleeding disorders.


GENITOURINARY:  Denies any blood in urine or increased urinary frequency.  


SKIN: Denies pruitis. Denies rash.





PHYSICAL EXAM: 


VITAL SIGNS: Reviewed


GENERAL: Well-developed in no acute distress. 


HEENT:  No sclera icterus. Extraocular movements grossly intact.  Moist buccal 

mucosa. Head is atraumatic, normocephalic. No nasal drainage.


ABDOMEN:  Soft.  Obese.  Nondistended.  Tenderness epigastric area


NEUROLOGIC:  Alert and oriented.  Cranial nerves II through XII grossly intact.





LABORATORY DATA:


WBC 10.7 hemoglobin 14.8 platelets 275


D-dimer 0.40 sodium 136 potassium 4.2 creatinine 0.92


Magnesium 2.0 LFTs are normal troponins negative 3


Lipase 229








IMAGING:


 computed tomography scan as stated above 





ASSESSMENT: 


1.  Abdominal pain 


2.  Recurrent hiatal hernia 


3.  Prior history of diaphragmatic hernia repair a [facility] 





PLAN: 


-Start patient on full liquid diet 


-Initially ordered a gastric emptying study.  Unfortunately per nuc med study 

cannot be completed until Tuesday 


-Continue conservative management 


-Continue IV fluids 


-Continue antiemetics 


-Patient scheduled for a Nissen fundoplication next Friday outpatient with Dr. berry





Thank you for this consultation





Physician Assistant note has been reviewed by physician. Signing provider agrees

with the documented findings, assessment, and plan of care. 





Past Medical History


Past Medical History: No Reported History


Additional Past Medical History / Comment(s): multiple dislocation of rt 

shoulder, back pain


History of Any Multi-Drug Resistant Organisms: None Reported


Past Surgical History: Back Surgery, Orthopedic Surgery


Additional Past Surgical History / Comment(s): rt shoulder


Past Anesthesia/Blood Transfusion Reactions: No Reported Reaction


Past Psychological History: No Psychological Hx Reported


Smoking Status: Never smoker


Past Alcohol Use History: None Reported


Past Drug Use History: None Reported





- Past Family History


  ** Mother


Additional Family Medical History / Comment(s): lupus, 





  ** Father


History Unknown: Yes





  ** Brother(s)


Additional Family Medical History / Comment(s): autoimmune disorder





Medications and Allergies


                                Home Medications











 Medication  Instructions  Recorded  Confirmed  Type


 


Cyclobenzaprine [Flexeril] 10 mg PO BID PRN 21 History


 


Temazepam [Restoril] 30 mg PO HS 21 History


 


traMADol HCL 50 mg PO TID PRN 21 History


 


Citalopram Hydrobromide [CeleXA] 20 mg PO DAILY #30 tab 21 Rx


 


Ondansetron HCl [Zofran] 4 mg PO Q8H PRN #40 tab 21 Rx


 


Sucralfate [Carafate] 1 gm PO ACHS #120 tablet 21 Rx


 


Dicyclomine [Bentyl] 10 mg PO TID PRN 21 History


 


Pantoprazole Sodium [Protonix] 20 mg PO DAILY 21 History








                                    Allergies











Allergy/AdvReac Type Severity Reaction Status Date / Time


 


hydromorphone [From Dilaudid] Allergy  Rash/Hives Verified 21 17:17


 


latex Allergy  Rash/Hives Verified 21 17:17


 


meperidine [From Demerol] Allergy  Rash/Hives Verified 21 17:17














Surgical - Exam


                                   Vital Signs











Temp Pulse Resp BP Pulse Ox


 


 98.3 F   104 H  18   113/76   96 


 


 21 15:04  21 15:04  21 15:04  21 15:04  21 15:04














Results





- Labs





                                 21 04:35





                                 21 04:35


                  Abnormal Lab Results - Last 24 Hours (Table)











  21 Range/Units





  04:35 04:35 


 


WBC   10.7 H  (3.8-10.6)  k/uL


 


Sodium  136 L   (137-145)  mmol/L


 


Glucose  106 H   (74-99)  mg/dL








                                 Diabetes panel











  21 Range/Units





  15:28 04:35 


 


Sodium  137  136 L  (137-145)  mmol/L


 


Potassium  4.3  4.2  (3.5-5.1)  mmol/L


 


Chloride  105  105  ()  mmol/L


 


Carbon Dioxide  23  25  (22-30)  mmol/L


 


BUN  11  15  (9-20)  mg/dL


 


Creatinine  0.80  0.92  (0.66-1.25)  mg/dL


 


Glucose  94  106 H  (74-99)  mg/dL


 


Calcium  9.6  9.4  (8.4-10.2)  mg/dL


 


AST  30  36  (17-59)  U/L


 


ALT  34  38  (4-49)  U/L


 


Alkaline Phosphatase  66  72  ()  U/L


 


Total Protein  7.1  6.8  (6.3-8.2)  g/dL


 


Albumin  4.3  4.0  (3.5-5.0)  g/dL








                                  Calcium panel











  21 Range/Units





  15:28 04:35 


 


Calcium  9.6  9.4  (8.4-10.2)  mg/dL


 


Albumin  4.3  4.0  (3.5-5.0)  g/dL








                                 Pituitary panel











  21 Range/Units





  15:28 04:35 


 


Sodium  137  136 L  (137-145)  mmol/L


 


Potassium  4.3  4.2  (3.5-5.1)  mmol/L


 


Chloride  105  105  ()  mmol/L


 


Carbon Dioxide  23  25  (22-30)  mmol/L


 


BUN  11  15  (9-20)  mg/dL


 


Creatinine  0.80  0.92  (0.66-1.25)  mg/dL


 


Glucose  94  106 H  (74-99)  mg/dL


 


Calcium  9.6  9.4  (8.4-10.2)  mg/dL








                                  Adrenal panel











  21 Range/Units





  15:28 04:35 


 


Sodium  137  136 L  (137-145)  mmol/L


 


Potassium  4.3  4.2  (3.5-5.1)  mmol/L


 


Chloride  105  105  ()  mmol/L


 


Carbon Dioxide  23  25  (22-30)  mmol/L


 


BUN  11  15  (9-20)  mg/dL


 


Creatinine  0.80  0.92  (0.66-1.25)  mg/dL


 


Glucose  94  106 H  (74-99)  mg/dL


 


Calcium  9.6  9.4  (8.4-10.2)  mg/dL


 


Total Bilirubin  0.2  0.3  (0.2-1.3)  mg/dL


 


AST  30  36  (17-59)  U/L


 


ALT  34  38  (4-49)  U/L


 


Alkaline Phosphatase  66  72  ()  U/L


 


Total Protein  7.1  6.8  (6.3-8.2)  g/dL


 


Albumin  4.3  4.0  (3.5-5.0)  g/dL

## 2021-09-04 LAB
CHOLEST SERPL-MCNC: 200 MG/DL (ref 0–200)
HDLC SERPL-MCNC: 26 MG/DL (ref 40–60)
LDLC SERPL CALC-MCNC: 117.2 MG/DL (ref 0–131)
TRIGL SERPL-MCNC: 284 MG/DL (ref 0–149)
VLDLC SERPL CALC-MCNC: 56.8 MG/DL (ref 5–40)

## 2021-09-04 RX ADMIN — SUCRALFATE SCH GM: 1 TABLET ORAL at 22:03

## 2021-09-04 RX ADMIN — MORPHINE SULFATE PRN MG: 2 INJECTION, SOLUTION INTRAMUSCULAR; INTRAVENOUS at 13:03

## 2021-09-04 RX ADMIN — ONDANSETRON PRN MG: 2 INJECTION INTRAMUSCULAR; INTRAVENOUS at 11:22

## 2021-09-04 RX ADMIN — SUCRALFATE SCH GM: 1 TABLET ORAL at 17:08

## 2021-09-04 RX ADMIN — MORPHINE SULFATE PRN MG: 2 INJECTION, SOLUTION INTRAMUSCULAR; INTRAVENOUS at 22:03

## 2021-09-04 RX ADMIN — MORPHINE SULFATE PRN MG: 2 INJECTION, SOLUTION INTRAMUSCULAR; INTRAVENOUS at 03:22

## 2021-09-04 RX ADMIN — CITALOPRAM HYDROBROMIDE SCH MG: 20 TABLET ORAL at 08:09

## 2021-09-04 RX ADMIN — CEFAZOLIN SCH MLS/HR: 330 INJECTION, POWDER, FOR SOLUTION INTRAMUSCULAR; INTRAVENOUS at 09:03

## 2021-09-04 RX ADMIN — SUCRALFATE SCH GM: 1 TABLET ORAL at 11:22

## 2021-09-04 RX ADMIN — PANTOPRAZOLE SODIUM SCH MG: 40 INJECTION, POWDER, FOR SOLUTION INTRAVENOUS at 08:09

## 2021-09-04 RX ADMIN — MORPHINE SULFATE PRN MG: 2 INJECTION, SOLUTION INTRAMUSCULAR; INTRAVENOUS at 17:12

## 2021-09-04 RX ADMIN — SUCRALFATE SCH GM: 1 TABLET ORAL at 08:09

## 2021-09-04 RX ADMIN — TEMAZEPAM SCH MG: 15 CAPSULE ORAL at 22:03

## 2021-09-04 RX ADMIN — HEPARIN SODIUM SCH UNIT: 5000 INJECTION INTRAVENOUS; SUBCUTANEOUS at 22:02

## 2021-09-04 RX ADMIN — HEPARIN SODIUM SCH UNIT: 5000 INJECTION INTRAVENOUS; SUBCUTANEOUS at 08:10

## 2021-09-04 RX ADMIN — MORPHINE SULFATE PRN MG: 2 INJECTION, SOLUTION INTRAMUSCULAR; INTRAVENOUS at 08:09

## 2021-09-04 RX ADMIN — CEFAZOLIN SCH: 330 INJECTION, POWDER, FOR SOLUTION INTRAMUSCULAR; INTRAVENOUS at 22:09

## 2021-09-04 NOTE — P.PN
Subjective


Progress Note Date: 09/04/21





HISTORY OF PRESENT ILLNESS


This is a 41-year-old male patient of Dr. Marvin with past medical history of 

chronic back pain with lumbar fusion 4 years ago.  Patient with history of di

aphragmatic hernia repair that was completed at [facility] 6 years 

ago and is known to have recurrent hiatal hernia with plan for surgical 

intervention on September 10 with Dr. Cruz.  Patient presented to hospital 

with epigastric pain and chest pain.  Troponins of the negative on 3 draws.  

Patient has been seen by cardiology who an acute coronary syndrome has been 

ruled out.  Heparin drip was subsequently discontinued.  Consult in place with 

general surgery regarding abdominal pain.  Dulcolax suppository has been ordered

but patient is refusing.  He denies having any fever, no cough.  Pain is in the 

epigastric area.  He has not been eating and is complaints of vomiting.





Patient presented to [facility] emergency center.  He was 

afebrile, heart rate 104, blood pressure 113/76, pulse ox 96% on room air.  EKG 

sinus tachycardia with no acute ST changes.  CBC was unremarkable.  D-dimer 0.4.

 CMP all within normal limits.  Troponin negative on 3 draws.  Lipase 229.


Chest x-ray showed no cardiopulmonary disease.


CAT scan of the chest abdomen and pelvis revealed no significant change.  

Moderate fixed hiatal hernia.  Thoracotomy changes in the left lower lobe.  

Hepatomegaly with hepatic steatosis.  


Echocardiogram reveals EF of 50-55% with borderline concentric left hypertrophy,

mild mitral regurgitation, mild tricuspid regurgitation..


Patient has been seen by cardiology and was atypical chest pain for heart 

disease, and cardiology has signed off.


Patient admitted to the [facility] floor.





9/4 patient is much better, however she requires scopolamine patches morphine 

for pain control, he is on Carafate as well as IV Protonix 40, his eating ice 

cream instead, we'll going to offer him ensure gel surgery has requested gastric

emptying time, unfortunately this will be done next Tuesday, as nothing would be

done this weekend.  Patient has options of going home earlier, with scopolamine 

patches, and Carafate.  His Horacio patches with 4 patch would be $23, patient 

cannot afford the previous price of $87.  We'll plan for discharge in a.m., with

outpatient nuclear medicine gastric emptying time, and outpatient surgery with 

Dr. Albert scheduled 





REVIEW O  F SYSTEMS


Constitutional: No fever, no chills, no night sweats.  No weight change.  No 

weakness, fatigue or lethargy.  No daytime sleepiness.


EENT: No headache.  No blurred vision or double vision, no loss of vision.  No 

loss of Hearing, no ringing in the ears, no dizziness.  No nasal drainage or 

congestion.  No epistaxis.  No sore throat.


Lungs: No shortness of breath, cough, no sputum production.  No wheezing.


Cardiovascular: Reports chest pain, no lower extremity edema.  No palpitations. 

No paroxysmal nocturnal dyspnea.  No orthopnea.  No lightheadedness or d

izziness.  No syncopal episodes.


Abdominal: Reports abdominal pain.  No nausea, vomiting.  Reports diarrhea.  No 

constipation.  No bloody or tarry stools. Reports loss of appetite.


Genitourinary: No dysuria, increased frequency, urgency.  No urinary retention.


Musculoskeletal: No myalgias.  No muscle weakness, no gait dysfunction, no 

frequent falls.  No back pain.  No neck pain.


Integumentary: No wounds, no lesions.  No rash or pruritus.  No unusual 

bruising.  No change in hair or nails.


Neurologic: No aphasia. No facial droop. No change in mentation. No head injury.

No headache. No paralysis. No paresthesia.


Psychiatric: No depression.  Reports anxiety.  No mood swings.


Endocrine: No abnormal blood sugars.  No weight change.  No excessive sweating 

or thirst.  No cold intolerance.  








Objective





- Vital Signs


Vital signs: 


                                   Vital Signs











Temp  98.0 F   09/04/21 07:00


 


Pulse  82   09/04/21 07:00


 


Resp  14   09/04/21 07:00


 


BP  112/73   09/04/21 07:00


 


Pulse Ox  98   09/04/21 07:00








                                 Intake & Output











 09/03/21 09/04/21 09/04/21





 18:59 06:59 18:59


 


Output Total 60  


 


Balance -60  


 


Output:   


 


  Emesis 60  


 


Other:   


 


  Voiding Method Toilet Toilet 


 


  # Voids 1  














- Constitutional


General appearance: Present: cooperative, obese





- EENT


Eyes: Present: EOMI, PERRLA, dentition normal





- Neck


Neck: Present: normal ROM





- Respiratory


Respiratory: bilateral: CTA, negative: diminished





- Cardiovascular


Rhythm: regular


Heart sounds: normal: S1





- Gastrointestinal


General gastrointestinal: Present: normal bowel sounds, soft





- Integumentary


Integumentary: Present: decreased turgor, normal





- Neurologic


Neurologic: Present: CNII-XII intact





- Musculoskeletal


Musculoskeletal: Present: gait normal, strength equal bilaterally, left sided 

weakness





- Psychiatric


Psychiatric: Present: A&O x's 3, appropriate affect, intact judgment & insight





- Labs


CBC & Chem 7: 


                                 09/03/21 04:35





                                 09/03/21 04:35


Labs: 


                  Abnormal Lab Results - Last 24 Hours (Table)











  09/03/21 Range/Units





  04:35 


 


Triglycerides  284.0 H  (0.0-149.0)  mg/dL


 


VLDL Cholesterol, Calc  56.80 H  (5.00-40.00)  mg/dL


 


HDL Cholesterol  26.0 L  (40.0-60.0)  mg/dL














Assessment and Plan


Plan: 








ASSESSMENT AND PLAN


1.  Abdominal pain with recurrent hiatal hernia with previous history of 

diaphragmatic hernia repair at [facility].  General surgery consult,

Dulcolax suppository was refused by the patient, gastric emptying study ordered,

full liquid diet.  Continue tramadol 50 mg 3 times daily as needed, morphine 2 

mg IV push every 4 hours as needed for pain, Zofran as needed for nausea, 

scopolamine patch for nausea and vomiting.  Continue incentive spirometry to 

reduce incidence of atelectasis and hospital acquired pneumonia and ensure, good

Rx cost for scopolamine patch discussed with patient,





2.  Chest pain, musculoskeletal.  Cardiology consult appreciated.  Cardiology 

has signed off.





3.  Chronic low back pain with previous lumbar fusion.  Continue Flexeril 10 mg 

twice daily as needed.





4.   Generalized anxiety disorder.





5.  GI prophylaxis.  Protonix 40 mg IV push daily.





6.  DVT prophylaxiHeparin subcu.





Patient will be admitted to the hospital for a minimum of 2 night stay.





DISCHARGE PLAN


Home.

## 2021-09-04 NOTE — P.PN
Subjective


Progress Note Date: 09/04/21




















CHIEF COMPLAINT:  Epigastric pain





HISTORY OF PRESENT ILLNESS: The patient is a 41-year-old male presents with 

recurrent hiatal hernia and now presents with epigastric abdominal pain. He 

still reports pressure unchanged from yesterday.





ROS:  No fevers or chills.  No new chest pain.  No productive sputum





PHYSICAL EXAM: 


VITAL SIGNS: Reviewed


CONSTITUTIONAL:  Well developed and in no acute distress. 


EYES:  Conjuctivae without sclera icterus. Extraocular movements grossly intact.




HEAD, EARS, NOSE, THROAT: Moist buccal mucosa. Head is atraumatic, 

normocephalic. Hears conversational speech. No nasal drainage.


NECK:  No gross thyroidomegaly.  No jugular venous distention.


RESPIRATORY:  Non-labored respirations and equal bilateral excursions.


CARDIOVASCULAR:  Palpable 2+ radial pulses.  Regular rate.  Regular rhythm.


ABDOMEN: Protuberant. No peritonitis.


MUSCULOSKELETAL:  No gross deformity of the lower extremities noted.  No 

clubbing.  No cyanosis.


SKIN: Good skin turgor. Well perfused. 


NEUROLOGIC: Cranial nerves II through XII grossly intact.  No focal or 

lateralizing signs. 


PSYCH:  Appropriate affect.  Alert and oriented to person, place and time. 





CLINICAL LABS: White blood cell count elevated 7.9-10.1.  Hemoglobin normal 

14.8.





STUDIES: CT of the abdomen pelvis and appendix were reviewed demonstrating 

slipped Nissen fundoplasty.  This is my independent interpretation.





ASSESSMENT: 


1.  Recurrent hiatal hernia.





PLAN: 


1.  Conservative management described


2.  Dietary changes reviewed including avoiding straws and carbonated beverages.







Objective





- Vital Signs


Vital signs: 


                                   Vital Signs











Temp  98.0 F   09/04/21 07:00


 


Pulse  82   09/04/21 07:00


 


Resp  14   09/04/21 07:00


 


BP  112/73   09/04/21 07:00


 


Pulse Ox  98   09/04/21 07:00








                                 Intake & Output











 09/03/21 09/04/21 09/04/21





 18:59 06:59 18:59


 


Output Total 60  


 


Balance -60  


 


Output:   


 


  Emesis 60  


 


Other:   


 


  Voiding Method Toilet Toilet 


 


  # Voids 1  














- Labs


CBC & Chem 7: 


                                 09/03/21 04:35





                                 09/03/21 04:35


Labs: 


                  Abnormal Lab Results - Last 24 Hours (Table)











  09/03/21 Range/Units





  04:35 


 


Triglycerides  284.0 H  (0.0-149.0)  mg/dL


 


VLDL Cholesterol, Calc  56.80 H  (5.00-40.00)  mg/dL


 


HDL Cholesterol  26.0 L  (40.0-60.0)  mg/dL














Assessment and Plan


(1) Chest pain


Current Visit: Yes   Status: Acute   Code(s): R07.9 - CHEST PAIN, UNSPECIFIED   

SNOMED Code(s): 70208156


   





(2) Hiatal hernia


Current Visit: Yes   Status: Acute   Code(s): K44.9 - DIAPHRAGMATIC HERNIA 

WITHOUT OBSTRUCTION OR GANGRENE   SNOMED Code(s): 59626109

## 2021-09-05 VITALS
SYSTOLIC BLOOD PRESSURE: 123 MMHG | TEMPERATURE: 97.9 F | HEART RATE: 60 BPM | DIASTOLIC BLOOD PRESSURE: 86 MMHG | RESPIRATION RATE: 14 BRPM

## 2021-09-05 RX ADMIN — PANTOPRAZOLE SODIUM SCH MG: 40 INJECTION, POWDER, FOR SOLUTION INTRAVENOUS at 07:49

## 2021-09-05 RX ADMIN — CEFAZOLIN SCH: 330 INJECTION, POWDER, FOR SOLUTION INTRAMUSCULAR; INTRAVENOUS at 05:39

## 2021-09-05 RX ADMIN — SUCRALFATE SCH GM: 1 TABLET ORAL at 11:49

## 2021-09-05 RX ADMIN — MORPHINE SULFATE PRN MG: 2 INJECTION, SOLUTION INTRAMUSCULAR; INTRAVENOUS at 07:49

## 2021-09-05 RX ADMIN — HYDROCODONE BITARTRATE AND ACETAMINOPHEN PRN EACH: 10; 325 TABLET ORAL at 11:49

## 2021-09-05 RX ADMIN — SUCRALFATE SCH GM: 1 TABLET ORAL at 07:50

## 2021-09-05 RX ADMIN — MORPHINE SULFATE PRN MG: 2 INJECTION, SOLUTION INTRAMUSCULAR; INTRAVENOUS at 03:11

## 2021-09-05 RX ADMIN — HEPARIN SODIUM SCH UNIT: 5000 INJECTION INTRAVENOUS; SUBCUTANEOUS at 07:50

## 2021-09-05 RX ADMIN — CITALOPRAM HYDROBROMIDE SCH MG: 20 TABLET ORAL at 07:50

## 2021-09-05 NOTE — P.DS
Providers


Date of admission: 


09/03/21 08:55





Expected date of discharge: 09/05/21


Attending physician: 


Marck Pardo





Consults: 





                                        





09/02/21 17:10


Consult Physician Urgent 


   Consulting Provider: Brenden Chi


   Consult Reason/Comments: Chest pain


   Do you want consulting provider notified?: Yes





09/03/21 08:51


Consult Physician Routine 


   Consulting Provider: Eber Cruz


   Consult Reason/Comments: esophageal hernia, abd pain


   Do you want consulting provider notified?: Yes











Primary care physician: 


Tyra Marvin





[facility] Course: 





HISTORY OF PRESENT ILLNESS


This is a 41-year-old male patient of Dr. Marvin with past medical history of 

chronic back pain with lumbar fusion 4 years ago.  Patient with history of 

diaphragmatic hernia repair that was completed at [facility] 6 years

ago and is known to have recurrent hiatal hernia with plan for surgical 

intervention on September 10 with Dr. Cruz.  Patient presented to hospital 

with epigastric pain and chest pain.  Troponins of the negative on 3 draws.  

Patient has been seen by cardiology who an acute coronary syndrome has been 

ruled out.  Heparin drip was subsequently discontinued.  Consult in place with 

general surgery regarding abdominal pain.  Dulcolax suppository has been ordered

but patient is refusing.  He denies having any fever, no cough.  Pain is in the 

epigastric area.  He has not been eating and is complaints of vomiting.





Patient presented to [facility] emergency center.  He was 

afebrile, heart rate 104, blood pressure 113/76, pulse ox 96% on room air.  EKG 

sinus tachycardia with no acute ST changes.  CBC was unremarkable.  D-dimer 0.4.

 CMP all within normal limits.  Troponin negative on 3 draws.  Lipase 229.


Chest x-ray showed no cardiopulmonary disease.


CAT scan of the chest abdomen and pelvis revealed no significant change.  

Moderate fixed hiatal hernia.  Thoracotomy changes in the left lower lobe.  

Hepatomegaly with hepatic steatosis.  


Echocardiogram reveals EF of 50-55% with borderline concentric left hypertrophy,

mild mitral regurgitation, mild tricuspid regurgitation..


Patient has been seen by cardiology and was atypical chest pain for heart 

disease, and cardiology has signed off.


Patient admitted to the [facility] floor.





9/4 patient is much better, however she requires scopolamine patches morphine 

for pain control, he is on Carafate as well as IV Protonix 40, his eating ice 

cream instead, we'll going to offer him ensure gel surgery has requested gastric

emptying time, unfortunately this will be done next Tuesday, as nothing would be

done this weekend.  Patient has options of going home earlier, with scopolamine 

patches, and Carafate.  His Horacio patches with 4 patch would be $23, patient 

cannot afford the previous price of $87.  We'll plan for discharge in a.m., with

outpatient nuclear medicine gastric emptying time, and outpatient surgery with 

Dr. Cruz as 


scheduled 


 


9/5: Patient is much better, he agrees on discharge today, his nausea and 

vomiting is controlled with scopolamine patch, a new patch will be replaced 

before discharge today, he also was given 12 pieces of Norco 10 for control of 

pain.  He is going to have his nuclear medicine gastric emptying time on Tuesday

as an outpatient, rather than inpatient.  Follow-up with DrRobinson as outpatient, for 

esophageal surgery Dr Cruz.  Follow-up with Dr. Malcolm, for the 

echocardiogram that noted moderate to severe right ventricular enlargement, has 

normal right ventricular systolic pressure no stenosis noted





Final diagnosis


1.  Abdominal pain with recurrent hiatal hernia with previous history of 

diaphragmatic hernia repair at [facility].  General surgery consult,

Dulcolax suppository was refused by the patient, gastric emptying study ordered,

full liquid diet.  Continue tramadol 50 mg 3 times daily as needed, morphine 2 

mg IV push every 4 hours as needed for pain, Zofran as needed for nausea, 

scopolamine patch for nausea and vomiting.  Continue incentive spirometry to 

reduce incidence of atelectasis and hospital acquired pneumonia and ensure, good

Rx cost for scopolamine patch discussed with patient, symptom control with 

scopolamine patch, Norco 12 pills given.  Continue on Zofran, patient needs to 

call for refills if needed for Zofran.  Continue Protonix, and Carafate





2.  Chest pain, musculoskeletal.  Cardiology consult appreciated.  Cardiology 

has signed off.





3.  Chronic low back pain with previous lumbar fusion.  Continue Flexeril 10 mg 

twice daily as needed.





4.   Generalized anxiety disorder.





5.  Paraesophageal hernia, large, requiring surgery is scheduled this coming 

Friday, Dr. Cruz





6.  Moderate to severe right ventricular enlargement, without any stenosis, 

normal right ventricle systolic pressure, follow-up with Dr. zayda montez as 

outpatient





5.  GI prophylaxis.  Protonix 40 mg IV push daily.





6.  DVT prophylaxiHeparin subcu.





Patient will be admitted to the hospital for a minimum of 2 night stay.





Patient Condition at Discharge: Fair





Plan - Discharge Summary


Discharge Rx Participant: No


New Discharge Prescriptions: 


New


   Scopolamine 1.5MG/72Hr Patch [TransDerm Scop] 1 patch TRANSDERM Q72H #4 patch


   HYDROcodone/APAP 10-325MG [Norco ] 1 each PO Q6HR PRN #12 tab


     PRN Reason: Pain





Continue


   traMADol HCL 50 mg PO TID PRN


     PRN Reason: Pain


   Ondansetron HCl [Zofran] 4 mg PO Q8H PRN #40 tab


     PRN Reason: Nausea And Vomiting


   Citalopram Hydrobromide [CeleXA] 20 mg PO DAILY #30 tab


   Cyclobenzaprine [Flexeril] 10 mg PO BID PRN


     PRN Reason: Pain


   Temazepam [Restoril] 30 mg PO HS


   Sucralfate [Carafate] 1 gm PO ACHS #120 tablet


   Pantoprazole Sodium [Protonix] 20 mg PO DAILY


   Dicyclomine [Bentyl] 10 mg PO TID PRN


     PRN Reason: IBS


Discharge Medication List





Cyclobenzaprine [Flexeril] 10 mg PO BID PRN 08/01/21 [History]


Temazepam [Restoril] 30 mg PO HS 08/16/21 [History]


traMADol HCL 50 mg PO TID PRN 08/16/21 [History]


Citalopram Hydrobromide [CeleXA] 20 mg PO DAILY #30 tab 08/18/21 [Rx]


Ondansetron HCl [Zofran] 4 mg PO Q8H PRN #40 tab 08/18/21 [Rx]


Sucralfate [Carafate] 1 gm PO ACHS #120 tablet 08/19/21 [Rx]


Dicyclomine [Bentyl] 10 mg PO TID PRN 09/02/21 [History]


Pantoprazole Sodium [Protonix] 20 mg PO DAILY 09/02/21 [History]


HYDROcodone/APAP 10-325MG [Norco ] 1 each PO Q6HR PRN #12 tab 09/05/21 

[Rx]


Scopolamine 1.5MG/72Hr Patch [TransDerm Scop] 1 patch TRANSDERM Q72H #4 patch 

09/05/21 [Rx]








Follow up Appointment(s)/Referral(s): 


Tyra Marvin MD [Primary Care Provider] - 1-2 days


Mora Malcolm MD [STAFF PHYSICIAN] - 1 Week (right ventricular mod to 

severe enlargement, normal rvsp)


Eber Cruz MD [STAFF PHYSICIAN] - 1 Week


Discharge Disposition: HOME SELF-CARE

## 2021-09-05 NOTE — P.PN
Subjective


Progress Note Date: 09/05/21




















CHIEF COMPLAINT:  Epigastric pain





HISTORY OF PRESENT ILLNESS: The patient is a 41-year-old male presents with 

recurrent hiatal hernia and now presents with epigastric abdominal pain. He 

reports his epigastric pain is unchanged but stable. No emesis noted. 





ROS:  No fevers or chills.  No new chest pain.  No productive sputum





PHYSICAL EXAM: 


VITAL SIGNS: Reviewed


CONSTITUTIONAL:  Well developed and in no acute distress. 


EYES:  Conjuctivae without sclera icterus. Extraocular movements grossly intact.




HEAD, EARS, NOSE, THROAT: Moist buccal mucosa. Head is atraumatic, 

normocephalic. Hears conversational speech. No nasal drainage.


NECK:  No gross thyroidomegaly.  No jugular venous distention.


RESPIRATORY:  Non-labored respirations and equal bilateral excursions.


CARDIOVASCULAR:  Palpable 2+ radial pulses.  Regular rate.  Regular rhythm.


ABDOMEN: Protuberant. No peritonitis.


MUSCULOSKELETAL:  No gross deformity of the lower extremities noted.  No 

clubbing.  No cyanosis.


SKIN: Good skin turgor. Well perfused. 


NEUROLOGIC: Cranial nerves II through XII grossly intact.  No focal or lateral

izing signs. 


PSYCH:  Appropriate affect.  Alert and oriented to person, place and time. 





CLINICAL LABS: No new labs.





ASSESSMENT: 


1.  Recurrent hiatal hernia.





PLAN: 


1.  Surgical repair as outpatient


2.  Although symptoms have note resolved, he is stable for discharge from a 

surgical standpoint


3.  Dietary surveillance and counseling performed with non-carbonated beverages 

and avoiding straws


4.  Increased weight, BMI 37.3 and morbid obesity puts him at increased risk for

complications for surgical procedure. 





Objective





- Vital Signs


Vital signs: 


                                   Vital Signs











Temp  97.9 F   09/05/21 07:21


 


Pulse  60   09/05/21 07:21


 


Resp  14   09/05/21 07:21


 


BP  123/86   09/05/21 07:21


 


Pulse Ox  99   09/05/21 07:21








                                 Intake & Output











 09/04/21 09/05/21 09/05/21





 18:59 06:59 18:59


 


Other:   


 


  Voiding Method  Toilet 


 


  # Voids 3  














- Labs


CBC & Chem 7: 


                                 09/03/21 04:35





                                 09/03/21 04:35





Assessment and Plan


(1) Chest pain


Current Visit: Yes   Status: Acute   Code(s): R07.9 - CHEST PAIN, UNSPECIFIED   

SNOMED Code(s): 01135676


   





(2) Hiatal hernia


Current Visit: Yes   Status: Acute   Code(s): K44.9 - DIAPHRAGMATIC HERNIA 

WITHOUT OBSTRUCTION OR GANGRENE   SNOMED Code(s): 19760921


   





(3) Morbid obesity due to excess calories


Current Visit: Yes   Status: Acute   Code(s): E66.01 - MORBID (SEVERE) OBESITY 

DUE TO EXCESS CALORIES   SNOMED Code(s): 155855761


   





(4) BMI 37.0-37.9, adult


Current Visit: Yes   Status: Acute   Code(s): Z68.37 - BODY MASS INDEX [BMI] 

37.0-37.9, ADULT   SNOMED Code(s): 413275882

## 2021-09-08 ENCOUNTER — HOSPITAL ENCOUNTER (OUTPATIENT)
Dept: HOSPITAL 47 - LABPAT | Age: 41
Discharge: HOME | End: 2021-09-08
Attending: SURGERY
Payer: COMMERCIAL

## 2021-09-08 DIAGNOSIS — K21.00: ICD-10-CM

## 2021-09-08 DIAGNOSIS — Z01.812: Primary | ICD-10-CM

## 2021-09-08 LAB
BASOPHILS # BLD AUTO: 0.1 K/UL (ref 0–0.2)
BASOPHILS NFR BLD AUTO: 1 %
EOSINOPHIL # BLD AUTO: 0.2 K/UL (ref 0–0.7)
EOSINOPHIL NFR BLD AUTO: 2 %
ERYTHROCYTE [DISTWIDTH] IN BLOOD BY AUTOMATED COUNT: 5.08 M/UL (ref 4.3–5.9)
ERYTHROCYTE [DISTWIDTH] IN BLOOD: 13.7 % (ref 11.5–15.5)
HCT VFR BLD AUTO: 47.5 % (ref 39–53)
HGB BLD-MCNC: 16 GM/DL (ref 13–17.5)
LYMPHOCYTES # SPEC AUTO: 1.9 K/UL (ref 1–4.8)
LYMPHOCYTES NFR SPEC AUTO: 20 %
MCH RBC QN AUTO: 31.5 PG (ref 25–35)
MCHC RBC AUTO-ENTMCNC: 33.7 G/DL (ref 31–37)
MCV RBC AUTO: 93.5 FL (ref 80–100)
MONOCYTES # BLD AUTO: 0.5 K/UL (ref 0–1)
MONOCYTES NFR BLD AUTO: 6 %
NEUTROPHILS # BLD AUTO: 6.7 K/UL (ref 1.3–7.7)
NEUTROPHILS NFR BLD AUTO: 71 %
PLATELET # BLD AUTO: 275 K/UL (ref 150–450)
WBC # BLD AUTO: 9.5 K/UL (ref 3.8–10.6)

## 2021-09-08 PROCEDURE — 85025 COMPLETE CBC W/AUTO DIFF WBC: CPT

## 2021-09-08 PROCEDURE — 36415 COLL VENOUS BLD VENIPUNCTURE: CPT

## 2021-09-10 ENCOUNTER — HOSPITAL ENCOUNTER (OUTPATIENT)
Dept: HOSPITAL 47 - OR | Age: 41
Setting detail: OBSERVATION
LOS: 1 days | Discharge: HOME | End: 2021-09-11
Attending: SURGERY | Admitting: SURGERY
Payer: COMMERCIAL

## 2021-09-10 VITALS — BODY MASS INDEX: 36.5 KG/M2

## 2021-09-10 DIAGNOSIS — R05: ICD-10-CM

## 2021-09-10 DIAGNOSIS — G89.29: ICD-10-CM

## 2021-09-10 DIAGNOSIS — Z84.89: ICD-10-CM

## 2021-09-10 DIAGNOSIS — Z87.39: ICD-10-CM

## 2021-09-10 DIAGNOSIS — F41.1: ICD-10-CM

## 2021-09-10 DIAGNOSIS — Z98.1: ICD-10-CM

## 2021-09-10 DIAGNOSIS — Z91.040: ICD-10-CM

## 2021-09-10 DIAGNOSIS — Z90.49: ICD-10-CM

## 2021-09-10 DIAGNOSIS — Z79.899: ICD-10-CM

## 2021-09-10 DIAGNOSIS — K58.9: ICD-10-CM

## 2021-09-10 DIAGNOSIS — M54.5: ICD-10-CM

## 2021-09-10 DIAGNOSIS — Z88.5: ICD-10-CM

## 2021-09-10 DIAGNOSIS — K21.9: Primary | ICD-10-CM

## 2021-09-10 DIAGNOSIS — Z98.890: ICD-10-CM

## 2021-09-10 DIAGNOSIS — K66.0: ICD-10-CM

## 2021-09-10 DIAGNOSIS — G43.909: ICD-10-CM

## 2021-09-10 DIAGNOSIS — K44.9: ICD-10-CM

## 2021-09-10 PROCEDURE — 87635 SARS-COV-2 COVID-19 AMP PRB: CPT

## 2021-09-10 PROCEDURE — 49329 UNLSTD LAPS PX ABD PERTM&OMN: CPT

## 2021-09-10 PROCEDURE — 43280 LAPAROSCOPY FUNDOPLASTY: CPT

## 2021-09-10 RX ADMIN — MORPHINE SULFATE PRN MG: 2 INJECTION, SOLUTION INTRAMUSCULAR; INTRAVENOUS at 23:37

## 2021-09-10 RX ADMIN — KETOROLAC TROMETHAMINE SCH MG: 15 INJECTION, SOLUTION INTRAMUSCULAR; INTRAVENOUS at 12:33

## 2021-09-10 RX ADMIN — MORPHINE SULFATE PRN MG: 2 INJECTION, SOLUTION INTRAMUSCULAR; INTRAVENOUS at 19:00

## 2021-09-10 RX ADMIN — MORPHINE SULFATE PRN MG: 2 INJECTION, SOLUTION INTRAMUSCULAR; INTRAVENOUS at 14:58

## 2021-09-10 RX ADMIN — KETOROLAC TROMETHAMINE SCH MG: 15 INJECTION, SOLUTION INTRAMUSCULAR; INTRAVENOUS at 16:26

## 2021-09-10 RX ADMIN — PANTOPRAZOLE SODIUM SCH: 40 TABLET, DELAYED RELEASE ORAL at 16:26

## 2021-09-10 RX ADMIN — CITALOPRAM HYDROBROMIDE SCH MG: 20 TABLET ORAL at 16:26

## 2021-09-10 RX ADMIN — FENTANYL CITRATE PRN MCG: 50 INJECTION, SOLUTION INTRAMUSCULAR; INTRAVENOUS at 12:32

## 2021-09-10 RX ADMIN — KETOROLAC TROMETHAMINE SCH MG: 15 INJECTION, SOLUTION INTRAMUSCULAR; INTRAVENOUS at 23:30

## 2021-09-10 RX ADMIN — SUCRALFATE SCH GM: 1 TABLET ORAL at 19:01

## 2021-09-10 RX ADMIN — SUCRALFATE SCH GM: 1 TABLET ORAL at 16:26

## 2021-09-10 RX ADMIN — FENTANYL CITRATE PRN MCG: 50 INJECTION, SOLUTION INTRAMUSCULAR; INTRAVENOUS at 12:21

## 2021-09-10 NOTE — P.OP
Date of Procedure: 09/10/21


Preoperative Diagnosis: 


Recurrent hiatal hernia


Postoperative Diagnosis: 


Dense adhesions


Procedure(s) Performed: 


Labs Lysis of adhesions


Anesthesia: MEGAN


Surgeon: Eber Cruz


Estimated Blood Loss (ml): 5


Pathology: none sent


Condition: stable


Disposition: PACU


Description of Procedure: 


The patient was placed on the operating table in the supine position.  The 

patient received general anesthesia.  And was placed in dorsal lithotomy 

position.  The patient was prepped and draped in the usual sterile fashion.  The

skin incision sites were anesthetized with 1% local Xylocaine.  The skin was 

incised in the left periumbilical area and then using a blade less 5 mm trocar 

under direct visualization panel cavity was entered.  After adequate 

insufflation the laparoscope was then placed into the peritoneal cavity.  Next a

5 mm trochars placed in the right epigastric position.  Another 5 millimeter 

trocar the right lateral position.  Another 5 millimeter trocar in the left 

lateral position a 5 mm trocar is placed in the left epigastric position.  And 

then the initial 5 mm trocar was exchanged for a 10 mm trocar.  The left lateral

lobe liver was retracted.  





The hiatus was visualized.  There were dense adhesions between the stomach and 

liver and stomach and left diaphragm.  Approximately 20 minutes of operative 

time used to lyse adhesions.  At this point decided to abort the case to avoid 

injury to the stomach.  There is no injury of the stomach seen.  The trochars 

withdrawn.  Skin was closed interrupted 3-0 Monocryl suture.

## 2021-09-10 NOTE — P.GSHP
History of Present Illness


H&P Date: 09/10/21


Chief Complaint: GERD, epigastric pain





Is a 41-year-old male who's had chronic issues with epigastric pain and GERD.  

Patient previous hiatal hernia repair reverse [place] several years ago.  

Patient has had several admissions to the hospital due to epigastric pain and 

GERD symptoms patient is developed a recurrent hiatal hernia.  He presents today

for laparoscopic repair.





Past Medical History


Past Medical History: GERD/Reflux


Additional Past Medical History / Comment(s): multiple dislocation of rt 

shoulder, hiatal hernia, hx migraines, IBS, EGD 21


History of Any Multi-Drug Resistant Organisms: None Reported


Past Surgical History: Appendectomy, Back Surgery, Cholecystectomy, Orthopedic 

Surgery, Tonsillectomy


Additional Past Surgical History / Comment(s): rt shoulder arthroscopy, left 

shoulder fusion,


Past Anesthesia/Blood Transfusion Reactions: No Reported Reaction


Smoking Status: Never smoker





- Past Family History


  ** Mother


Additional Family Medical History / Comment(s): lupus, 





  ** Father


History Unknown: Yes





  ** Brother(s)


Additional Family Medical History / Comment(s): autoimmune disorder





Medications and Allergies


                                Home Medications











 Medication  Instructions  Recorded  Confirmed  Type


 


Cyclobenzaprine [Flexeril] 10 mg PO BID PRN 21 History


 


Temazepam [Restoril] 30 mg PO HS 21 History


 


traMADol HCL 50 mg PO TID PRN 08/16/21 09/10/21 History


 


Citalopram Hydrobromide [CeleXA] 20 mg PO DAILY #30 tab 08/18/21 09/10/21 Rx


 


Ondansetron HCl [Zofran] 4 mg PO Q8H PRN #40 tab 21 Rx


 


Sucralfate [Carafate] 1 gm PO ACHS #120 tablet 21 Rx


 


Dicyclomine [Bentyl] 10 mg PO TID PRN 21 History


 


Pantoprazole Sodium [Protonix] 20 mg PO DAILY 09/02/21 09/10/21 History


 


HYDROcodone/APAP 10-325MG [Norco 1 each PO Q6HR PRN #12 tab 09/05/21 09/10/21 Rx





]    








                                    Allergies











Allergy/AdvReac Type Severity Reaction Status Date / Time


 


hydromorphone [From Dilaudid] Allergy  Rash/Hives Verified 21 14:12


 


latex Allergy  Rash/Hives Verified 21 14:12


 


meperidine [From Demerol] Allergy  Rash/Hives Verified 21 14:12














Surgical - Exam


                                   Vital Signs











Temp Pulse Resp BP Pulse Ox


 


 98.4 F   90   18   137/81   97 


 


 09/10/21 09:10  09/10/21 09:10  09/10/21 09:10  09/10/21 09:10  09/10/21 09:10














- General


well developed, well nourished, no distress





- Eyes


PERRL





- ENT


normal pinna





- Neck


no masses





- Respiratory


normal expansion





- Cardiovascular


Rhythm: regular





- Abdomen


Abdomen: soft, non tender





Assessment and Plan


Assessment: 


GERD





Recurrent hiatal hernia





We'll perform laparoscopic repair.

## 2021-09-11 VITALS — SYSTOLIC BLOOD PRESSURE: 113 MMHG | RESPIRATION RATE: 17 BRPM | DIASTOLIC BLOOD PRESSURE: 65 MMHG | TEMPERATURE: 97.4 F

## 2021-09-11 VITALS — HEART RATE: 81 BPM

## 2021-09-11 RX ADMIN — PANTOPRAZOLE SODIUM SCH MG: 40 TABLET, DELAYED RELEASE ORAL at 07:29

## 2021-09-11 RX ADMIN — MORPHINE SULFATE PRN MG: 2 INJECTION, SOLUTION INTRAMUSCULAR; INTRAVENOUS at 04:40

## 2021-09-11 RX ADMIN — CITALOPRAM HYDROBROMIDE SCH MG: 20 TABLET ORAL at 07:29

## 2021-09-11 RX ADMIN — SUCRALFATE SCH GM: 1 TABLET ORAL at 07:29

## 2021-09-11 RX ADMIN — KETOROLAC TROMETHAMINE SCH MG: 15 INJECTION, SOLUTION INTRAMUSCULAR; INTRAVENOUS at 05:41

## 2021-09-11 RX ADMIN — MORPHINE SULFATE PRN MG: 2 INJECTION, SOLUTION INTRAMUSCULAR; INTRAVENOUS at 09:16

## 2021-09-11 NOTE — P.DS
Providers


Date of admission: 


09/11/21 01:05





Expected date of discharge: 09/11/21


Attending physician: 


Eber Cruz





Consults: 





                                        





09/10/21 11:40


Consult Physician Routine 


   Consulting Provider: Tyra Marvin


   Consult Reason/Comments: Medical management


   Do you want consulting provider notified?: Yes











Primary care physician: 


Tyra Marvin





[facility] Course: 





Patient brought in the hospital yesterday for repair recurrent hiatal hernia.  

During the operation apparently too many adhesions were identified and the 

procedure was aborted.  Patient was kept overnight for observation.  Doing b

helen today.  Pain is controlled.  He would like to go home.  We'll discharge.





Plan - Discharge Summary


Discharge Rx Participant: Yes


New Discharge Prescriptions: 


No Action


   traMADol HCL 50 mg PO TID PRN


     PRN Reason: Pain


   Ondansetron HCl [Zofran] 4 mg PO Q8H PRN #40 tab


     PRN Reason: Nausea And Vomiting


   Citalopram Hydrobromide [CeleXA] 20 mg PO DAILY #30 tab


   HYDROcodone/APAP 10-325MG [Norco ] 1 tab PO Q6HR PRN


     PRN Reason: Pain


   Cyclobenzaprine [Flexeril] 10 mg PO BID PRN


     PRN Reason: Pain


   Temazepam [Restoril] 30 mg PO HS


   Sucralfate [Carafate] 1 gm PO ACHS #120 tablet


   Pantoprazole Sodium [Protonix] 20 mg PO DAILY


   Dicyclomine [Bentyl] 10 mg PO TID PRN


     PRN Reason: IBS


Discharge Medication List





Cyclobenzaprine [Flexeril] 10 mg PO BID PRN 08/01/21 [History]


Temazepam [Restoril] 30 mg PO HS 08/16/21 [History]


traMADol HCL 50 mg PO TID PRN 08/16/21 [History]


Citalopram Hydrobromide [CeleXA] 20 mg PO DAILY #30 tab 08/18/21 [Rx]


Ondansetron HCl [Zofran] 4 mg PO Q8H PRN #40 tab 08/18/21 [Rx]


Sucralfate [Carafate] 1 gm PO ACHS #120 tablet 08/19/21 [Rx]


Dicyclomine [Bentyl] 10 mg PO TID PRN 09/02/21 [History]


Pantoprazole Sodium [Protonix] 20 mg PO DAILY 09/02/21 [History]


HYDROcodone/APAP 10-325MG [Norco ] 1 tab PO Q6HR PRN 09/11/21 [History]

## 2021-09-11 NOTE — P.CONS
History of Present Illness





- Reason for Consult


Consult date: 21


Medical management





- History of Present Illness





HISTORY OF PRESENT ILLNESS


This is a 41-year-old male patient of Dr. Marvin with past medical history of 

chronic back pain with lumbar fusion 4 years ago.  Patient with history of 

diaphragmatic hernia repair that was completed at [facility] 6 years

ago and is known to have recurrent hiatal hernia with plan for surgical 

intervention on September 10 with Dr. Cruz.  Patient underwent lysis of 

adhesions yesterday and has had no post-op complications. He complains of a 

little dry cough. No chest pain, shortness of breath, nausea/vomiting. He denies

fever and chills. He states his abdominin is sore and he feels tired. Patient is

tolerating clear liquid diet. Last BM yesterday morning. He is afebrile, HR 79, 

/65, PO 97% on room air.  He is anticipating discharge home later today.  

Medication reconciliation reviewed.








REVIEW OF SYSTEMS


Constitutional: No fever, no chills, no night sweats.  No weight change.  No 

weakness, fatigue or lethargy.  No daytime sleepiness.


EENT: No headache.  No blurred vision or double vision, no loss of vision.  No 

loss of Hearing, no ringing in the ears, no dizziness.  No nasal drainage or 

congestion.  No epistaxis.  No sore throat.


Lungs: No shortness of breath, cough, no sputum production.  No wheezing.


Cardiovascular: Reports chest pain, no lower extremity edema.  No palpitations. 

No paroxysmal nocturnal dyspnea.  No orthopnea.  No lightheadedness or 

dizziness.  No syncopal episodes.


Abdominal: Reports abdominal discomfort.  No nausea, vomiting.  Reports 

diarrhea.  No constipation.  No bloody or tarry stools.  No loss of appetite.


Genitourinary: No dysuria, increased frequency, urgency.  No urinary retention.


Musculoskeletal: No myalgias.  No muscle weakness, no gait dysfunction, no 

frequent falls.  No back pain.  No neck pain.


Integumentary: No wounds, no lesions.  No rash or pruritus.  No unusual 

bruising.  No change in hair or nails.


Neurologic: No aphasia. No facial droop. No change in mentation. No head injury.

No headache. No paralysis. No paresthesia.


Psychiatric: No depression.  Reports anxiety.  No mood swings.


Endocrine: No abnormal blood sugars.  No weight change.  No excessive sweating 

or thirst.  No cold intolerance.  





SOCIAL HISTORY


Patient is a lifelong nonsmoker, no illicit drug use, marijuana use or alcohol 

use.  Patient is  and lives alone.  





FAMILY HISTORY


Mother  at age 59 from lupus.  Patient does not know his father.  Patient 

has one brother with autoimmune disorder.  Patient does not have any sisters.  





PHYSICAL EXAMINATION


Gen: This brodie 41-year-old obese male seen resting in bed in no acute distress.


EENT: Head is atraumatic, normocephalic. Pupils equal, round. Sclerae is 

anicteric. 


NECK: Supple. No JVD. No lymphadenopathy. No thyromegaly. 


LUNGS: Clear to auscultation. No wheezes or rhonchi.  No intercostal retra

ctions.


HEART: Regular rate and rhythm. No murmur. 


ABDOMEN: Soft. Bowel sounds are present. No masses.  Surgical sutures sites show

no signs of infection


EXTREMITIES: No pedal edema.  No calf tenderness.


NEUROLOGICAL: Patient is awake, alert and oriented x3. Cranial nerves 2 through 

12 are grossly intact. 





ASSESSMENT AND PLAN


1.  Abdominal pain with recurrent hiatal hernia with previous history of 

diaphragmatic hernia repair at [facility] status post lysis of 

adhesions, postop day #1.  No postop complications.  Patient is cleared for 

discharge by medicine.  Patient has been on Protonix and Carafate, Bentyl.





2.  Chronic low back pain with previous lumbar fusion.  Continue Flexeril 10 mg 

twice daily as needed.





4.   Generalized anxiety disorder.  Continue citalopram 20 mg daily.





5.  GI prophylaxis.  Protonix 40 mg IV push daily.





6.  DVT prophylaxiHeparin subcu.











DISCHARGE PLAN


Home.





Impression and plan of care have been directed as dictated by the signing 

physician.  Daya Rubio nurse practitioner acting as scribe for signing 

physician.





Past Medical History


Past Medical History: GERD/Reflux


Additional Past Medical History / Comment(s): multiple dislocation of rt 

shoulder, hiatal hernia, hx migraines, IBS, EGD 21


History of Any Multi-Drug Resistant Organisms: None Reported


Past Surgical History: Appendectomy, Back Surgery, Cholecystectomy, Orthopedic 

Surgery, Tonsillectomy


Additional Past Surgical History / Comment(s): rt shoulder arthroscopy, left 

shoulder fusion,


Past Anesthesia/Blood Transfusion Reactions: No Reported Reaction


Past Psychological History: No Psychological Hx Reported


Smoking Status: Never smoker


Past Alcohol Use History: None Reported


Past Drug Use History: None Reported





- Past Family History


  ** Mother


Additional Family Medical History / Comment(s): lupus, 





  ** Father


History Unknown: Yes





  ** Brother(s)


Additional Family Medical History / Comment(s): autoimmune disorder





Medications and Allergies


                                Home Medications











 Medication  Instructions  Recorded  Confirmed  Type


 


Cyclobenzaprine [Flexeril] 10 mg PO BID PRN 21 History


 


Temazepam [Restoril] 30 mg PO HS 21 History


 


traMADol HCL 50 mg PO TID PRN 21 History


 


Citalopram Hydrobromide [CeleXA] 20 mg PO DAILY #30 tab 21 Rx


 


Ondansetron HCl [Zofran] 4 mg PO Q8H PRN #40 tab 21 Rx


 


Sucralfate [Carafate] 1 gm PO ACHS #120 tablet 21 Rx


 


Dicyclomine [Bentyl] 10 mg PO TID PRN 21 History


 


Pantoprazole Sodium [Protonix] 20 mg PO DAILY 21 History


 


HYDROcodone/APAP 10-325MG [Norco 1 tab PO Q6HR PRN 21 History





]    








                                    Allergies











Allergy/AdvReac Type Severity Reaction Status Date / Time


 


hydromorphone [From Dilaudid] Allergy  Rash/Hives Verified 21 14:12


 


latex Allergy  Rash/Hives Verified 21 14:12


 


meperidine [From Demerol] Allergy  Rash/Hives Verified 21 14:12














Physical Exam


Vitals: 


                                   Vital Signs











  Temp Pulse Pulse Resp BP Pulse Ox


 


 21 07:00  97.4 F L  79   17  113/65  97


 


 21 01:25  98.2 F   71  16  113/70  94 L


 


 09/10/21 19:08  97.9 F   94  18  110/71  96


 


 09/10/21 14:00   79   15  


 


 09/10/21 13:25  98.0 F  79   18  119/74  98


 


 09/10/21 13:02   84   16  121/64  92 L


 


 09/10/21 12:47   91   16  111/59  95


 


 09/10/21 12:32   74   18  122/57  94 L


 


 09/10/21 12:17   82   18  124/64  100


 


 09/10/21 12:01   82   18  120/70  100


 


 09/10/21 11:50  98.5 F  81   16  131/78  100


 


 09/10/21 09:10  98.4 F   90  18  137/81  97








                                Intake and Output











 09/10/21 09/11/21 09/11/21





 22:59 06:59 14:59


 


Other:   


 


  Voiding Method Toilet Toilet 


 


  # Voids 1 1

## 2021-09-13 ENCOUNTER — HOSPITAL ENCOUNTER (OUTPATIENT)
Dept: HOSPITAL 47 - EC | Age: 41
Setting detail: OBSERVATION
LOS: 1 days | Discharge: HOME | End: 2021-09-14
Attending: INTERNAL MEDICINE | Admitting: INTERNAL MEDICINE
Payer: COMMERCIAL

## 2021-09-13 DIAGNOSIS — J98.11: ICD-10-CM

## 2021-09-13 DIAGNOSIS — Z84.89: ICD-10-CM

## 2021-09-13 DIAGNOSIS — J98.4: ICD-10-CM

## 2021-09-13 DIAGNOSIS — Z88.5: ICD-10-CM

## 2021-09-13 DIAGNOSIS — Z87.39: ICD-10-CM

## 2021-09-13 DIAGNOSIS — K58.9: ICD-10-CM

## 2021-09-13 DIAGNOSIS — Z90.49: ICD-10-CM

## 2021-09-13 DIAGNOSIS — G89.29: ICD-10-CM

## 2021-09-13 DIAGNOSIS — Z79.899: ICD-10-CM

## 2021-09-13 DIAGNOSIS — K44.9: ICD-10-CM

## 2021-09-13 DIAGNOSIS — R07.89: Primary | ICD-10-CM

## 2021-09-13 DIAGNOSIS — Z91.040: ICD-10-CM

## 2021-09-13 DIAGNOSIS — K21.9: ICD-10-CM

## 2021-09-13 DIAGNOSIS — F41.1: ICD-10-CM

## 2021-09-13 DIAGNOSIS — M54.5: ICD-10-CM

## 2021-09-13 DIAGNOSIS — G43.909: ICD-10-CM

## 2021-09-13 DIAGNOSIS — Z98.890: ICD-10-CM

## 2021-09-13 DIAGNOSIS — Z98.1: ICD-10-CM

## 2021-09-13 LAB
ALBUMIN SERPL-MCNC: 4.2 G/DL (ref 3.5–5)
ALP SERPL-CCNC: 70 U/L (ref 38–126)
ALT SERPL-CCNC: 48 U/L (ref 4–49)
AMYLASE SERPL-CCNC: 58 U/L (ref 30–110)
ANION GAP SERPL CALC-SCNC: 11 MMOL/L
APTT BLD: 22.4 SEC (ref 22–30)
AST SERPL-CCNC: 33 U/L (ref 17–59)
BASOPHILS # BLD AUTO: 0.1 K/UL (ref 0–0.2)
BASOPHILS NFR BLD AUTO: 1 %
BUN SERPL-SCNC: 15 MG/DL (ref 9–20)
CALCIUM SPEC-MCNC: 9.9 MG/DL (ref 8.4–10.2)
CHLORIDE SERPL-SCNC: 103 MMOL/L (ref 98–107)
CO2 SERPL-SCNC: 25 MMOL/L (ref 22–30)
EOSINOPHIL # BLD AUTO: 0.2 K/UL (ref 0–0.7)
EOSINOPHIL NFR BLD AUTO: 2 %
ERYTHROCYTE [DISTWIDTH] IN BLOOD BY AUTOMATED COUNT: 4.9 M/UL (ref 4.3–5.9)
ERYTHROCYTE [DISTWIDTH] IN BLOOD: 12.9 % (ref 11.5–15.5)
GLUCOSE SERPL-MCNC: 137 MG/DL (ref 74–99)
HCT VFR BLD AUTO: 44.9 % (ref 39–53)
HGB BLD-MCNC: 15.7 GM/DL (ref 13–17.5)
INR PPP: 0.9 (ref ?–1.2)
LIPASE SERPL-CCNC: 177 U/L (ref 23–300)
LYMPHOCYTES # SPEC AUTO: 2.8 K/UL (ref 1–4.8)
LYMPHOCYTES NFR SPEC AUTO: 30 %
MAGNESIUM SPEC-SCNC: 2.1 MG/DL (ref 1.6–2.3)
MCH RBC QN AUTO: 32 PG (ref 25–35)
MCHC RBC AUTO-ENTMCNC: 34.9 G/DL (ref 31–37)
MCV RBC AUTO: 91.6 FL (ref 80–100)
MONOCYTES # BLD AUTO: 0.7 K/UL (ref 0–1)
MONOCYTES NFR BLD AUTO: 7 %
NEUTROPHILS # BLD AUTO: 5.3 K/UL (ref 1.3–7.7)
NEUTROPHILS NFR BLD AUTO: 59 %
PLATELET # BLD AUTO: 261 K/UL (ref 150–450)
POTASSIUM SERPL-SCNC: 4.1 MMOL/L (ref 3.5–5.1)
PROT SERPL-MCNC: 7 G/DL (ref 6.3–8.2)
PT BLD: 9.7 SEC (ref 9–12)
SODIUM SERPL-SCNC: 139 MMOL/L (ref 137–145)
WBC # BLD AUTO: 9.1 K/UL (ref 3.8–10.6)

## 2021-09-13 PROCEDURE — 85025 COMPLETE CBC W/AUTO DIFF WBC: CPT

## 2021-09-13 PROCEDURE — 96376 TX/PRO/DX INJ SAME DRUG ADON: CPT

## 2021-09-13 PROCEDURE — 99285 EMERGENCY DEPT VISIT HI MDM: CPT

## 2021-09-13 PROCEDURE — 74177 CT ABD & PELVIS W/CONTRAST: CPT

## 2021-09-13 PROCEDURE — 80061 LIPID PANEL: CPT

## 2021-09-13 PROCEDURE — 96375 TX/PRO/DX INJ NEW DRUG ADDON: CPT

## 2021-09-13 PROCEDURE — 83735 ASSAY OF MAGNESIUM: CPT

## 2021-09-13 PROCEDURE — 83690 ASSAY OF LIPASE: CPT

## 2021-09-13 PROCEDURE — 84484 ASSAY OF TROPONIN QUANT: CPT

## 2021-09-13 PROCEDURE — 71275 CT ANGIOGRAPHY CHEST: CPT

## 2021-09-13 PROCEDURE — 36415 COLL VENOUS BLD VENIPUNCTURE: CPT

## 2021-09-13 PROCEDURE — 93005 ELECTROCARDIOGRAM TRACING: CPT

## 2021-09-13 PROCEDURE — 87635 SARS-COV-2 COVID-19 AMP PRB: CPT

## 2021-09-13 PROCEDURE — 80053 COMPREHEN METABOLIC PANEL: CPT

## 2021-09-13 PROCEDURE — 96374 THER/PROPH/DIAG INJ IV PUSH: CPT

## 2021-09-13 PROCEDURE — 82150 ASSAY OF AMYLASE: CPT

## 2021-09-13 PROCEDURE — 85730 THROMBOPLASTIN TIME PARTIAL: CPT

## 2021-09-13 PROCEDURE — 85610 PROTHROMBIN TIME: CPT

## 2021-09-13 RX ADMIN — PANTOPRAZOLE SODIUM SCH MG: 40 INJECTION, POWDER, FOR SOLUTION INTRAVENOUS at 20:30

## 2021-09-13 NOTE — CT
EXAMINATION TYPE: CT abdomen pelvis w con

 

DATE OF EXAM: 9/13/2021

 

COMPARISON: 8/6/2021 and 9/3/2021

 

HISTORY: Chest and epigastric pain. Nausea.

 

CT DLP: 1862.4 mGycm

Automated exposure control for dose reduction was used.

 

CONTRAST: 

Performed with IV Contrast, patient injected with 100ml mL of Isovue 370.

 

Images obtained from the diaphragm to the floor the pelvis with oral and IV contrast.

 

FINDINGS:

There is some atelectasis at the left posterior lung base. Heart size is normal. There is no pleural 
effusion. There is no pericardial effusion. There is small hiatal hernia. There is paraesophageal hia
ibrahima hernia.

 

Liver spleen stomach pancreas appear intact. The bile ducts are not dilated. Gallbladder appears abse
nt.

 

There is no adrenal mass. Kidneys show satisfactory contrast opacification. There is no hydronephrosi
s. Ureters are not dilated. There is no retroperitoneal adenopathy. Bladder distends smoothly. There 
is no inguinal hernia. There is no free fluid in the pelvis. There is no evidence of a pelvic mass.

 

There is no mesenteric edema. There is no ascites or free air. There is no evidence of a bowel obstru
ction. Appendix is not seen.

 

There is metal artifact from posterior fusion surgery at L5-S1. Lumbar vertebra have normal alignment
. There is no compression fracture. The bony pelvis appears intact. The hip joints are intact.

 

IMPRESSION:

Small hiatal hernia. No acute abnormality in the abdomen pelvis. Subsegmental atelectasis and scarrin
g at the left posterior lung base. No significant change compared to old exam.

## 2021-09-13 NOTE — ED
General Adult HPI





- General


Chief complaint: Chest Pain


Stated complaint: Chest pain


Time Seen by Provider: 21 17:30


Source: patient, RN notes reviewed


Mode of arrival: ambulatory


Limitations: no limitations





- History of Present Illness


Initial comments: 





Patient is a pleasant 41-year-old male presenting to the emergency Department 

with complaints of chest and abdominal discomfort.  Onset of symptoms was 

Friday.  Patient has chronic symptoms similar to this secondary to hiatal 

hernia.  Patient did have a Nissen fundoplication done 6 years ago.  Patient was

going to have revision done Friday.  They did go in for surgery however patient 

had too much scar tissue and they were unable to complete surgery.  Patient has 

had discomfort since that time.  Discomfort is similar to his chronic pain 

however more intense than normal.  Discomfort is currently 10/10.  At some 

nausea and vomiting.  Decreased oral intake.





- Related Data


                                Home Medications











 Medication  Instructions  Recorded  Confirmed


 


Cyclobenzaprine [Flexeril] 10 mg PO BID PRN 21


 


Temazepam [Restoril] 30 mg PO HS 21


 


traMADol HCL 50 mg PO TID PRN 21


 


Dicyclomine [Bentyl] 10 mg PO TID PRN 21


 


Pantoprazole Sodium [Protonix] 20 mg PO DAILY 21


 


HYDROcodone/APAP 10-325MG [Norco 1 tab PO Q6HR PRN 21





]   








                                  Previous Rx's











 Medication  Instructions  Recorded


 


Citalopram Hydrobromide [CeleXA] 20 mg PO DAILY #30 tab 21


 


Ondansetron HCl [Zofran] 4 mg PO Q8H PRN #40 tab 21


 


Sucralfate [Carafate] 1 gm PO ACHS #120 tablet 21











                                    Allergies











Allergy/AdvReac Type Severity Reaction Status Date / Time


 


hydromorphone [From Dilaudid] Allergy  Rash/Hives Verified 21 17:25


 


latex Allergy  Rash/Hives Verified 21 17:25


 


meperidine [From Demerol] Allergy  Rash/Hives Verified 21 17:25














Review of Systems


ROS Statement: 


Those systems with pertinent positive or pertinent negative responses have been 

documented in the HPI.





ROS Other: All systems not noted in ROS Statement are negative.


Constitutional: Denies: fever


Eyes: Denies: eye pain


ENT: Denies: ear pain


Respiratory: Denies: dyspnea


Cardiovascular: Reports: as per HPI


Endocrine: Denies: fatigue


Gastrointestinal: Reports: as per HPI, abdominal pain, nausea, vomiting


Genitourinary: Denies: dysuria


Musculoskeletal: Denies: back pain


Skin: Denies: rash


Neurological: Denies: weakness





Past Medical History


Past Medical History: GERD/Reflux


Additional Past Medical History / Comment(s): multiple dislocation of rt 

shoulder, hiatal hernia, hx migraines, IBS, EGD 21


History of Any Multi-Drug Resistant Organisms: None Reported


Past Surgical History: Appendectomy, Back Surgery, Cholecystectomy, Orthopedic 

Surgery, Tonsillectomy


Additional Past Surgical History / Comment(s): rt shoulder arthroscopy, left 

shoulder fusion,


Past Anesthesia/Blood Transfusion Reactions: No Reported Reaction


Past Psychological History: No Psychological Hx Reported


Smoking Status: Never smoker


Past Alcohol Use History: None Reported


Past Drug Use History: None Reported





- Past Family History


  ** Mother


Additional Family Medical History / Comment(s): lupus, 





  ** Father


History Unknown: Yes





  ** Brother(s)


Additional Family Medical History / Comment(s): autoimmune disorder





General Exam


Limitations: no limitations


General appearance: alert, in no apparent distress


Head exam: Present: normocephalic


Eye exam: Present: normal appearance


Neck exam: Present: normal inspection


Respiratory exam: Present: normal lung sounds bilaterally


Cardiovascular Exam: Present: regular rate, normal rhythm


  ** Expanded


Peripheral pulses: 2+: Radial (R), Radial (L), Posterior Tibialis (R), Posterior

Tibialis (L)


GI/Abdominal exam: Present: soft, tenderness (Mild diffuse tenderness).  Absent:

distended, guarding, rebound, rigid


Extremities exam: Present: normal inspection.  Absent: calf tenderness


Neurological exam: Present: alert


Psychiatric exam: Present: normal affect, normal mood


Skin exam: Present: normal color





Course


                                   Vital Signs











  21





  19:04 19:59


 


Temperature  98.5 F


 


Pulse Rate 89 95


 


Respiratory 18 18





Rate  


 


Blood Pressure 123/80 122/86


 


O2 Sat by Pulse 96 97





Oximetry  














EKG Findings





- EKG Comments:


EKG Findings:: Normal sinus rhythm with a rate of 86.  .  QRS 84.  .

 .  Left axis.  Inferior Q waves.  No acute ST change.





Medical Decision Making





- Medical Decision Making





Patient reevaluated and still appears uncomfortable.  Patient requests further 

pain medication.  Patient updated on results and plan.  Case was discussed with 

Dr. Herbert, who will admit covering for Dr. Marvin.  She would like consults with 

Dr. Cruz as well as cardiology.





- Lab Data


Result diagrams: 


                                 21 18:00





                                 21 18:00


                                   Lab Results











  21 Range/Units





  18:00 18:00 18:00 


 


WBC  9.1    (3.8-10.6)  k/uL


 


RBC  4.90    (4.30-5.90)  m/uL


 


Hgb  15.7    (13.0-17.5)  gm/dL


 


Hct  44.9    (39.0-53.0)  %


 


MCV  91.6    (80.0-100.0)  fL


 


MCH  32.0    (25.0-35.0)  pg


 


MCHC  34.9    (31.0-37.0)  g/dL


 


RDW  12.9    (11.5-15.5)  %


 


Plt Count  261    (150-450)  k/uL


 


MPV  7.2    


 


Neutrophils %  59    %


 


Lymphocytes %  30    %


 


Monocytes %  7    %


 


Eosinophils %  2    %


 


Basophils %  1    %


 


Neutrophils #  5.3    (1.3-7.7)  k/uL


 


Lymphocytes #  2.8    (1.0-4.8)  k/uL


 


Monocytes #  0.7    (0-1.0)  k/uL


 


Eosinophils #  0.2    (0-0.7)  k/uL


 


Basophils #  0.1    (0-0.2)  k/uL


 


PT   9.7   (9.0-12.0)  sec


 


INR   0.9   (<1.2)  


 


APTT   22.4   (22.0-30.0)  sec


 


Sodium    139  (137-145)  mmol/L


 


Potassium    4.1  (3.5-5.1)  mmol/L


 


Chloride    103  ()  mmol/L


 


Carbon Dioxide    25  (22-30)  mmol/L


 


Anion Gap    11  mmol/L


 


BUN    15  (9-20)  mg/dL


 


Creatinine    0.87  (0.66-1.25)  mg/dL


 


Est GFR (CKD-EPI)AfAm    >90  (>60 ml/min/1.73 sqM)  


 


Est GFR (CKD-EPI)NonAf    >90  (>60 ml/min/1.73 sqM)  


 


Glucose    137 H  (74-99)  mg/dL


 


Calcium    9.9  (8.4-10.2)  mg/dL


 


Magnesium    2.1  (1.6-2.3)  mg/dL


 


Total Bilirubin    0.3  (0.2-1.3)  mg/dL


 


AST    33  (17-59)  U/L


 


ALT    48  (4-49)  U/L


 


Alkaline Phosphatase    70  ()  U/L


 


Troponin I     (0.000-0.034)  ng/mL


 


Total Protein    7.0  (6.3-8.2)  g/dL


 


Albumin    4.2  (3.5-5.0)  g/dL


 


Amylase    58  ()  U/L


 


Lipase    177  ()  U/L














  21 Range/Units





  18:00 


 


WBC   (3.8-10.6)  k/uL


 


RBC   (4.30-5.90)  m/uL


 


Hgb   (13.0-17.5)  gm/dL


 


Hct   (39.0-53.0)  %


 


MCV   (80.0-100.0)  fL


 


MCH   (25.0-35.0)  pg


 


MCHC   (31.0-37.0)  g/dL


 


RDW   (11.5-15.5)  %


 


Plt Count   (150-450)  k/uL


 


MPV   


 


Neutrophils %   %


 


Lymphocytes %   %


 


Monocytes %   %


 


Eosinophils %   %


 


Basophils %   %


 


Neutrophils #   (1.3-7.7)  k/uL


 


Lymphocytes #   (1.0-4.8)  k/uL


 


Monocytes #   (0-1.0)  k/uL


 


Eosinophils #   (0-0.7)  k/uL


 


Basophils #   (0-0.2)  k/uL


 


PT   (9.0-12.0)  sec


 


INR   (<1.2)  


 


APTT   (22.0-30.0)  sec


 


Sodium   (137-145)  mmol/L


 


Potassium   (3.5-5.1)  mmol/L


 


Chloride   ()  mmol/L


 


Carbon Dioxide   (22-30)  mmol/L


 


Anion Gap   mmol/L


 


BUN   (9-20)  mg/dL


 


Creatinine   (0.66-1.25)  mg/dL


 


Est GFR (CKD-EPI)AfAm   (>60 ml/min/1.73 sqM)  


 


Est GFR (CKD-EPI)NonAf   (>60 ml/min/1.73 sqM)  


 


Glucose   (74-99)  mg/dL


 


Calcium   (8.4-10.2)  mg/dL


 


Magnesium   (1.6-2.3)  mg/dL


 


Total Bilirubin   (0.2-1.3)  mg/dL


 


AST   (17-59)  U/L


 


ALT   (4-49)  U/L


 


Alkaline Phosphatase   ()  U/L


 


Troponin I  <0.012  (0.000-0.034)  ng/mL


 


Total Protein   (6.3-8.2)  g/dL


 


Albumin   (3.5-5.0)  g/dL


 


Amylase   ()  U/L


 


Lipase   ()  U/L














- Radiology Data


Radiology results: report reviewed (Computed tomography scan of the chest 

abdomen and pelvis negative for pulmonary embolism.  Hiatal hernia.)





Disposition


Clinical Impression: 


 Chest pain, Hiatal hernia





Disposition: ADMITTED AS IP TO THIS \[facility]\""


Is patient prescribed a controlled substance at d/c from ED?: No


Referrals: 


Tyra Marvin MD [Primary Care Provider] - 1-2 days


Decision Time: 20:06

## 2021-09-13 NOTE — CT
EXAMINATION TYPE: CT angio chest

 

DATE OF EXAM: 9/13/2021

 

COMPARISON: 8/5/2021

 

HISTORY: Chest and epigastric pain. Nausea.

 

CT DLP: 703.9 mGycm

Automated exposure control for dose reduction was used.

 

CONTRAST: 

Performed with IV Contrast, patient injected with 100ml mL of Isovue 370.

 

Images obtained from the thoracic inlet to the diaphragm with IV contrast. There are 3-D post process
ed images.

 

There is some mild atelectasis left lung base. There is no evidence of a pulmonary mass. There is no 
pleural effusion. There is no pericardial effusion. Heart size is normal.

 

There is no mediastinal adenopathy. There are no hilar masses. Thoracic aorta is intact. There is no 
aneurysm or dissection. There is normal contrast opacification of the pulmonary arteries. There are n
o filling defects.

 

Thoracic vertebra appear intact. There is no compression fracture. Sternum is intact.

 

IMPRESSION:

No evidence of pulmonary embolism. There is some atelectasis left lung base similar to old exam. No s
uspicious pulmonary mass.

## 2021-09-14 VITALS — SYSTOLIC BLOOD PRESSURE: 113 MMHG | DIASTOLIC BLOOD PRESSURE: 81 MMHG | HEART RATE: 85 BPM

## 2021-09-14 VITALS — RESPIRATION RATE: 16 BRPM

## 2021-09-14 VITALS — TEMPERATURE: 98 F

## 2021-09-14 LAB
CHOLEST SERPL-MCNC: 180 MG/DL (ref 0–200)
HDLC SERPL-MCNC: 44 MG/DL (ref 40–60)
LDLC SERPL CALC-MCNC: 96.8 MG/DL (ref 0–131)
TRIGL SERPL-MCNC: 196 MG/DL (ref 0–149)
VLDLC SERPL CALC-MCNC: 39.2 MG/DL (ref 5–40)

## 2021-09-14 RX ADMIN — MORPHINE SULFATE PRN MG: 4 INJECTION, SOLUTION INTRAMUSCULAR; INTRAVENOUS at 01:05

## 2021-09-14 RX ADMIN — ONDANSETRON PRN MG: 2 INJECTION INTRAMUSCULAR; INTRAVENOUS at 01:07

## 2021-09-14 RX ADMIN — NITROGLYCERIN SCH INCH: 20 OINTMENT TOPICAL at 12:41

## 2021-09-14 RX ADMIN — NITROGLYCERIN SCH: 20 OINTMENT TOPICAL at 01:07

## 2021-09-14 RX ADMIN — PANTOPRAZOLE SODIUM SCH MG: 40 INJECTION, POWDER, FOR SOLUTION INTRAVENOUS at 07:54

## 2021-09-14 RX ADMIN — NITROGLYCERIN SCH: 20 OINTMENT TOPICAL at 07:15

## 2021-09-14 RX ADMIN — ONDANSETRON PRN MG: 2 INJECTION INTRAMUSCULAR; INTRAVENOUS at 09:34

## 2021-09-14 RX ADMIN — MORPHINE SULFATE PRN MG: 4 INJECTION, SOLUTION INTRAMUSCULAR; INTRAVENOUS at 07:55

## 2021-09-14 NOTE — P.GSCN
History of Present Illness


Consult date: 21


Requesting physician: Rishabh Wheat


History of present illness: 





CHIEF COMPLAINT: Chest pain, nausea and vomiting





HISTORY OF PRESENT ILLNESS: This is a 41-year-old male with a known history of 

diaphragmatic hernia repair that was completed at the [facility] 

about 6 years ago.  He has a known recurrent hiatal hernia and was recently 

hospitalized and was scheduled to undergo Nissen fundoplication 09/10/2021.  The

patient had dense adhesions and therefore Nissen fundoplication was aborted, and

the patient underwent lysis of his adhesions.  The patient was discharged home 

on 2021.  He returned back to the emergency department yesterday evening 

with complaints of chest pain which she states radiates to his shoulder, nausea 

and vomiting.  Patient states he has had nausea and vomiting and unable to keep 

any food down for weeks.  Patient denies feeling like anything is getting stuck,

states that he just can't eat because every time he does he feels like it just 

sits in his stomach and comes back up.  There is no pain with swallowing.  Is 

having normal bowel movements.  He is complaining of some sharp pain in his left

upper quadrant.  He did have an EGD done on 2021 that showed gastroesophag

eal reflux disease and paraesophageal hernia.  He he denies any coffee-ground 

emesis or hematemesis.  States he is still having some chest discomfort which 

feels almost like a spasm.  States the chest discomfort comes and any time at 

times he will get sweaty and feeling like his heart is racing.  He denies any 

fevers or chills.  He has been evaluated by cardiology and troponins have been 

negative.  Cardiology reports acute coronary syndrome was ruled out.  He 

underwent a CT of the abdomen and pelvis which shows a small hiatal hernia.  No 

acute abnormality in the abdomen pelvis.  Subsegmental atelectasis and scarring 

at the left posterior lung base.  No significant change compared to old exam.  

Admitting labs WBC 9.1 hemoglobin 15.7 platelet count 261,000 INR 0.9 sodium 139

potassium 4.1 BUN 15 creatinine 0.87 glucose 137 albumin 4.2 amylase 58 lipase 

177





PAST MEDICAL HISTORY: 


See list.





PAST SURGICAL HISTORY: 


See list.





MEDICATIONS: 


See list.





ALLERGIES: 


See list.





SOCIAL HISTORY: No illicit drug use.  





REVIEW OF SYSTEMS: 


CONSTITUTIONAL: Denies fever or chills.


HEENT: Denies blurred vision, vision changes, or eye pain. Denies hemoptysis 


ENDOCRINE: Denies heat or cold intolerance.


CARDIOVASCULAR: Denies chest pain or pressure.


RESPIRATORY: No shortness of breath. 


GASTROINTESTINAL: Denies abdominal pain. Denies nausea or vomiting.


NEURO: Denies history of seizures.


PSYCH: No depression or suicidal ideation


HEMATOLOGIC: Denies bleeding disorders.


LYMPHATIC:  The patient denies any lumps and bumps around the neck. 


GENITOURINARY:  Denies any blood in urine or increased urinary frequency.  


MUSCULOSKELETAL:  Denies myalgias. Denies joint swelling. Denies decreased range

of motion beyond patients baseline.


SKIN: Denies pruitis. Denies rash.





PHYSICAL EXAM: 


VITAL SIGNS: Reviewed


GENERAL: Well-developed in no acute distress. 


HEENT:  No sclera icterus. Extraocular movements grossly intact.  Moist buccal 

mucosa. 


Head is atraumatic, normocephalic. 


NECK:  Supple without lymphadenopathy.


CHEST:  Non-labored respirations


ABDOMEN:  Soft.   Obese.  Nondistended Mild left upper quadrant tenderness.


NEUROLOGIC:   alert and oriented. .  Cranial nerves II through XII grossly 

intact.


SKIN: Well perfused.  Good skin turgor. 





LABORATORY DATA:


WBC 9.1 hemoglobin 15.7 platelet count 261,000 INR 0.9 


Sodium 139 potassium 4.1 BUN 15 creatinine 0.87 glucose 137


Albumin 4.2 amylase 58 lipase 177











IMAGING:


CT abdomen and pelvis shows small hiatal hernia.  No acute abnormality in the 

abdomen and pelvis.  Subsegmental atelectasis and scarring at the left posterior

lung base.  No significant change compared to old exam.











ASSESSMENT: 


1.  Hiatal hernia 


2.  Nausea and vomiting


3.  GERD


4.  Status post recent lysis of adhesions, aborted NiSsen fundoplication due to 

dense adhesions 


5.  Chest pain, cardiology on consult, acute coronary syndrome ruled out








PLAN: 


-Started on clear liquid diet, advance as tolerated


-No surgical intervention planned


-Recommend patient follow up with [facility] as previously discussed


-Continue Protonix for GI prophylaxis


-Patient is cleared for discharge from Gen. surgery 





The impression and plan of care has been dictated as directed.





Dr. Cruz


I performed a history and examination of this patient,  discussed the same with 

the dictator.  I agree with the dictator's note ,documented as a scribe.  Any 

additional findings or plans will be noted. 





Past Medical History


Past Medical History: GERD/Reflux


Additional Past Medical History / Comment(s): multiple dislocation of rt 

shoulder, hiatal hernia, hx migraines, IBS, EGD 21


History of Any Multi-Drug Resistant Organisms: None Reported


Past Surgical History: Appendectomy, Back Surgery, Cholecystectomy, Orthopedic 

Surgery, Tonsillectomy


Additional Past Surgical History / Comment(s): rt shoulder arthroscopy, left 

shoulder fusion,


Past Anesthesia/Blood Transfusion Reactions: No Reported Reaction


Past Psychological History: No Psychological Hx Reported


Smoking Status: Never smoker


Past Alcohol Use History: None Reported


Past Drug Use History: None Reported





- Past Family History


  ** Mother


Additional Family Medical History / Comment(s): lupus, 





  ** Father


History Unknown: Yes





  ** Brother(s)


Additional Family Medical History / Comment(s): autoimmune disorder





Medications and Allergies


                                Home Medications











 Medication  Instructions  Recorded  Confirmed  Type


 


Cyclobenzaprine [Flexeril] 10 mg PO BID PRN 21 History


 


Temazepam [Restoril] 30 mg PO HS 21 History


 


traMADol HCL 50 mg PO TID PRN 21 History


 


Citalopram Hydrobromide [CeleXA] 20 mg PO DAILY #30 tab 21 Rx


 


Ondansetron HCl [Zofran] 4 mg PO Q8H PRN #40 tab 21 Rx


 


Sucralfate [Carafate] 1 gm PO ACHS #120 tablet 21 Rx


 


Dicyclomine [Bentyl] 10 mg PO TID PRN 21 History


 


Pantoprazole Sodium [Protonix] 20 mg PO DAILY 21 History


 


HYDROcodone/APAP 10-325MG [Norco 1 tab PO Q6HR PRN 21 History





]    








                                    Allergies











Allergy/AdvReac Type Severity Reaction Status Date / Time


 


hydromorphone [From Dilaudid] Allergy  Rash/Hives Verified 21 17:25


 


latex Allergy  Rash/Hives Verified 21 17:25


 


meperidine [From Demerol] Allergy  Rash/Hives Verified 21 17:25














Surgical - Exam


                                   Vital Signs











Pulse Resp BP Pulse Ox


 


 89   18   123/80   96 


 


 21 19:04  21 19:04  21 19:04  21 19:04














Results





- Labs





                                 21 18:00





                                 21 18:00


                  Abnormal Lab Results - Last 24 Hours (Table)











  21 Range/Units





  18:00 05:03 


 


Glucose  137 H   (74-99)  mg/dL


 


Triglycerides   196.0 H  (0.0-149.0)  mg/dL








                                 Diabetes panel











  21 Range/Units





  18:00 05:03 


 


Sodium  139   (137-145)  mmol/L


 


Potassium  4.1   (3.5-5.1)  mmol/L


 


Chloride  103   ()  mmol/L


 


Carbon Dioxide  25   (22-30)  mmol/L


 


BUN  15   (9-20)  mg/dL


 


Creatinine  0.87   (0.66-1.25)  mg/dL


 


Glucose  137 H   (74-99)  mg/dL


 


Calcium  9.9   (8.4-10.2)  mg/dL


 


AST  33   (17-59)  U/L


 


ALT  48   (4-49)  U/L


 


Alkaline Phosphatase  70   ()  U/L


 


Total Protein  7.0   (6.3-8.2)  g/dL


 


Albumin  4.2   (3.5-5.0)  g/dL


 


Triglycerides   196.0 H  (0.0-149.0)  mg/dL


 


HDL Cholesterol   44.0  (40.0-60.0)  mg/dL








                                  Calcium panel











  21 Range/Units





  18:00 


 


Calcium  9.9  (8.4-10.2)  mg/dL


 


Albumin  4.2  (3.5-5.0)  g/dL








                                 Pituitary panel











  21 Range/Units





  18:00 


 


Sodium  139  (137-145)  mmol/L


 


Potassium  4.1  (3.5-5.1)  mmol/L


 


Chloride  103  ()  mmol/L


 


Carbon Dioxide  25  (22-30)  mmol/L


 


BUN  15  (9-20)  mg/dL


 


Creatinine  0.87  (0.66-1.25)  mg/dL


 


Glucose  137 H  (74-99)  mg/dL


 


Calcium  9.9  (8.4-10.2)  mg/dL








                                  Adrenal panel











  21 Range/Units





  18:00 


 


Sodium  139  (137-145)  mmol/L


 


Potassium  4.1  (3.5-5.1)  mmol/L


 


Chloride  103  ()  mmol/L


 


Carbon Dioxide  25  (22-30)  mmol/L


 


BUN  15  (9-20)  mg/dL


 


Creatinine  0.87  (0.66-1.25)  mg/dL


 


Glucose  137 H  (74-99)  mg/dL


 


Calcium  9.9  (8.4-10.2)  mg/dL


 


Total Bilirubin  0.3  (0.2-1.3)  mg/dL


 


AST  33  (17-59)  U/L


 


ALT  48  (4-49)  U/L


 


Alkaline Phosphatase  70  ()  U/L


 


Total Protein  7.0  (6.3-8.2)  g/dL


 


Albumin  4.2  (3.5-5.0)  g/dL

## 2021-09-14 NOTE — P.HPIM
History of Present Illness


H&P Date: 21





HISTORY AND PHYSICAL AND DISCHARGE SUMMARY: 





HISTORY OF PRESENT ILLNESS


This is a 41-year-old male patient of Dr. Marvin with past medical history of 

chronic back pain with lumbar fusion 4 years ago.  Patient with history of 

diaphragmatic hernia repair that was completed at [facility] 6 years

ago and is known to have recurrent hiatal hernia with plan for surgical 

intervention on September 10 with Dr. Cruz but there were dense adhesions 

between the stomach and liver and stomach and left diaphragm and lysis of 

adhesions was performed and surgical intervention was aborted.  Patient had no 

postop complications and was discharged home on the .  He states once he was

home he started vomiting can stop was unable to sleep and had severe pain.  Pain

is in the lower esophageal area and he states because of retching he has abdo

duncan pain in the middle.  He denies any diarrhea.  He states he has had no 

solid food since August 10.  He is taking some fat shakes only according to the 

patient.  He also states that he attempted to reach Dr. Marvin regarding pain 

medication prescription and refills for Norco 10 and tramadol but he did not 

hear back from the office.  He states he has run out of his pain medications at 

home and did not have any refills.





Patient presented to [facility] emergency and found to be 

afebrile, heart rate 89, blood pressure 123/80, pulse ox 96% on room air.  EKG 

was a sinus rhythm with no acute ST changes.  CBC was normal.  Electrolytes and 

renal function normal.  Blood sugar 137.  Liver function tests were normal.  

Troponin negative on 3 draws.  Lipase 177.  Coronavirus not detected.  Albumin 

4.2.  Triglycerides 196, , HDL 44.


CAT scan of the abdomen and pelvis with contrast revealed small hiatal hernia.  

No acute abnormality in the abdomen and pelvis.  Subsegmental atelectasis and 

scarring at the left posterior lung base.  No significant change.


CT angios of the chest revealed no evidence of pulmonary embolism.  Some 

atelectasis in the left lung base similar to old exam.  No suspicious pulmonary 

mass.  


He has been seen by cardiology for epigastric discomfort and acute coronary 

syndrome has been ruled out.


She has been seen by general surgery and started on clear liquid diet, plan to 

discharge if patient tolerates diet.








REVIEW OF SYSTEMS


Constitutional: No fever, no chills, no night sweats.  No weight change.  No 

weakness, fatigue or lethargy.  No daytime sleepiness.


EENT: No headache.  No blurred vision or double vision, no loss of vision.  No 

loss of Hearing, no ringing in the ears, no dizziness.  No nasal drainage or con

gestion.  No epistaxis.  No sore throat.


Lungs: No shortness of breath, cough, no sputum production.  No wheezing.


Cardiovascular: Reports chest pain, no lower extremity edema.  No palpitations. 

No paroxysmal nocturnal dyspnea.  No orthopnea.  No lightheadedness or 

dizziness.  No syncopal episodes.


Abdominal: Reports abdominal discomfort.  No nausea, vomiting.  Reports 

diarrhea.  No constipation.  No bloody or tarry stools.  No loss of appetite.


Genitourinary: No dysuria, increased frequency, urgency.  No urinary retention.


Musculoskeletal: No myalgias.  No muscle weakness, no gait dysfunction, no 

frequent falls.  No back pain.  No neck pain.


Integumentary: No wounds, no lesions.  No rash or pruritus.  No unusual 

bruising.  No change in hair or nails.


Neurologic: No aphasia. No facial droop. No change in mentation. No head injury.

No headache. No paralysis. No paresthesia.


Psychiatric: No depression.  Reports anxiety.  No mood swings.


Endocrine: No abnormal blood sugars.  No weight change.    





SOCIAL HISTORY


Patient is a lifelong nonsmoker, no illicit drug use, marijuana use or alcohol 

use.  Patient is  and lives alone.  





FAMILY HISTORY


Mother  at age 59 from lupus.  Patient does not know his father.  Patient 

has one brother with autoimmune disorder.  Patient does not have any sisters.  





PHYSICAL EXAMINATION


Gen: This brodie 41-year-old obese male seen resting on ER stretcher in no acute d

istress.


EENT: Head is atraumatic, normocephalic. Pupils equal, round. Sclerae is 

anicteric. 


NECK: Supple. No JVD. No lymphadenopathy. No thyromegaly. 


LUNGS: Clear to auscultation. No wheezes or rhonchi.  No intercostal 

retractions.


HEART: Regular rate and rhythm. No murmur. 


ABDOMEN: Soft. Bowel sounds are present. No masses.  Surgical sites show no 

signs of infection


EXTREMITIES: No pedal edema.  No calf tenderness.


NEUROLOGICAL: Patient is awake, alert and oriented x3. Cranial nerves 2 through 

12 are grossly intact. 





ASSESSMENT AND PLAN


1.  Distal esophageal pain with recurrent hiatal hernia with previous history of

diaphragmatic hernia repair at [facility] status post lysis of 

adhesions.  Consult with Gen. surgery appreciated.  Patient is started clear 

liquid diet and if tolerated, discharge home with planned follow-up with 

[facility].





2.  Epigastric discomfort.  Cardiology consult appreciated.  Acute coronary 

syndrome ruled out.





3.  Chronic low back pain with previous lumbar fusion.  Continue Flexeril 10 mg 

twice daily as needed.





4.   Generalized anxiety disorder.  Continue citalopram 20 mg daily.





5.  GI prophylaxis.  Protonix 40 mg IV push daily.





6.  DVT prophylaxis.  Early ambulation








Discharge Medication List


Cyclobenzaprine [Flexeril] 10 mg PO BID PRN 21 [History]


Temazepam [Restoril] 30 mg PO HS 21 [History]


traMADol HCL 50 mg PO TID PRN 21 [History]


Citalopram Hydrobromide [CeleXA] 20 mg PO DAILY #30 tab 21 [Rx]


Ondansetron HCl [Zofran] 4 mg PO Q8H PRN #40 tab 21 [Rx]


Sucralfate [Carafate] 1 gm PO ACHS #120 tablet 21 [Rx]


Dicyclomine [Bentyl] 10 mg PO TID PRN 21 [History]


Pantoprazole Sodium [Protonix] 20 mg PO DAILY 21 [History]


HYDROcodone/APAP 10-325MG [Norco ] 1 tab PO Q6HR PRN 21 [History]








DISCHARGE PLAN


Home.





Impression and plan of care have been directed as dictated by the signing 

physician.  Daya Rubio nurse practitioner acting as scribe for signing 

physician.





Past Medical History


Past Medical History: GERD/Reflux


Additional Past Medical History / Comment(s): multiple dislocation of rt 

shoulder, hiatal hernia, hx migraines, IBS, EGD 21


History of Any Multi-Drug Resistant Organisms: None Reported


Past Surgical History: Appendectomy, Back Surgery, Cholecystectomy, Orthopedic 

Surgery, Tonsillectomy


Additional Past Surgical History / Comment(s): rt shoulder arthroscopy, left 

shoulder fusion,


Past Anesthesia/Blood Transfusion Reactions: No Reported Reaction


Past Psychological History: No Psychological Hx Reported


Smoking Status: Never smoker


Past Alcohol Use History: None Reported


Past Drug Use History: None Reported





- Past Family History


  ** Mother


Additional Family Medical History / Comment(s): lupus, 





  ** Father


History Unknown: Yes





  ** Brother(s)


Additional Family Medical History / Comment(s): autoimmune disorder





Medications and Allergies


                                Home Medications











 Medication  Instructions  Recorded  Confirmed  Type


 


Cyclobenzaprine [Flexeril] 10 mg PO BID PRN 21 History


 


Temazepam [Restoril] 30 mg PO HS 21 History


 


traMADol HCL 50 mg PO TID PRN 21 History


 


Citalopram Hydrobromide [CeleXA] 20 mg PO DAILY #30 tab 21 Rx


 


Ondansetron HCl [Zofran] 4 mg PO Q8H PRN #40 tab 21 Rx


 


Sucralfate [Carafate] 1 gm PO ACHS #120 tablet 21 Rx


 


Dicyclomine [Bentyl] 10 mg PO TID PRN 21 History


 


Pantoprazole Sodium [Protonix] 20 mg PO DAILY 21 History


 


HYDROcodone/APAP 10-325MG [Norco 1 tab PO Q6HR PRN 21 History





]    








                                    Allergies











Allergy/AdvReac Type Severity Reaction Status Date / Time


 


hydromorphone [From Dilaudid] Allergy  Rash/Hives Verified 21 17:25


 


latex Allergy  Rash/Hives Verified 21 17:25


 


meperidine [From Demerol] Allergy  Rash/Hives Verified 21 17:25














Physical Exam


Vitals: 


                                   Vital Signs











  Temp Pulse Resp BP Pulse Ox


 


 21 07:55  98.5 F  66  16  103/57  95


 


 21 05:00  98.3 F  86  18  112/65  96


 


 21 00:54  98.3 F  89  18  132/91  96


 


 21 19:59  98.5 F  95  18  122/86  97


 


 21 19:04   89  18  123/80  96








                                Intake and Output











 21





 22:59 06:59 14:59


 


Other:   


 


  Weight 111.13 kg  














Results


CBC & Chem 7: 


                                 21 18:00





                                 21 18:00


Labs: 


                  Abnormal Lab Results - Last 24 Hours (Table)











  21 Range/Units





  18:00 


 


Glucose  137 H  (74-99)  mg/dL

## 2021-09-14 NOTE — P.CRDCN
History of Present Illness


Consult date: 21


Chief complaint: Chest discomfort


History of present illness: 





This is a 41-year-old gentleman who we consulted to see in the emergency 

department for further evaluation of epigastric discomfort.  The patient few 

days ago underwent laparoscopic procedure attempted to do Nissenn fundoplication

but that was unsuccessful because of the scar tissue according to the patient.  

The patient presented complaining of epigastric discomfort was some extension of

the discomfort to the abdomen as well as to the chest.  He did vomit 

significantly.  He denies any shortness of breath.  No dizziness or 

lightheadedness or any feeling of heart racing or fluttering or presyncope or 

syncope.  The EKG showed sinus rhythm without any significant ST or T-wave a

bnormalities.  The troponin was checked and came in to be unremarkable.  He 

underwent a computed tomography scan of the abdomen which showed no acute 

abnormalities.  The computed tomography scan also revealed small hiatal hernia. 

He underwent a computed tomography scan of the chest which came in to be 

unremarkable for pulmonary embolism and currently the patient is pain-free.  On 

examination he does have mild epigastric and abdominal tenderness.





Past Medical History


Past Medical History: GERD/Reflux


Additional Past Medical History / Comment(s): multiple dislocation of rt 

shoulder, hiatal hernia, hx migraines, IBS, EGD 21


History of Any Multi-Drug Resistant Organisms: None Reported


Past Surgical History: Appendectomy, Back Surgery, Cholecystectomy, Orthopedic 

Surgery, Tonsillectomy


Additional Past Surgical History / Comment(s): rt shoulder arthroscopy, left 

shoulder fusion,


Past Anesthesia/Blood Transfusion Reactions: No Reported Reaction


Past Psychological History: No Psychological Hx Reported


Smoking Status: Never smoker


Past Alcohol Use History: None Reported


Past Drug Use History: None Reported





- Past Family History


  ** Mother


Additional Family Medical History / Comment(s): lupus, 





  ** Father


History Unknown: Yes





  ** Brother(s)


Additional Family Medical History / Comment(s): autoimmune disorder





Medications and Allergies


                                Home Medications











 Medication  Instructions  Recorded  Confirmed  Type


 


Cyclobenzaprine [Flexeril] 10 mg PO BID PRN 21 History


 


Temazepam [Restoril] 30 mg PO HS 21 History


 


traMADol HCL 50 mg PO TID PRN 21 History


 


Citalopram Hydrobromide [CeleXA] 20 mg PO DAILY #30 tab 21 Rx


 


Ondansetron HCl [Zofran] 4 mg PO Q8H PRN #40 tab 21 Rx


 


Sucralfate [Carafate] 1 gm PO ACHS #120 tablet 21 Rx


 


Dicyclomine [Bentyl] 10 mg PO TID PRN 21 History


 


Pantoprazole Sodium [Protonix] 20 mg PO DAILY 21 History


 


HYDROcodone/APAP 10-325MG [Norco 1 tab PO Q6HR PRN 21 History





]    








                                    Allergies











Allergy/AdvReac Type Severity Reaction Status Date / Time


 


hydromorphone [From Dilaudid] Allergy  Rash/Hives Verified 21 17:25


 


latex Allergy  Rash/Hives Verified 21 17:25


 


meperidine [From Demerol] Allergy  Rash/Hives Verified 21 17:25














Physical Exam


Vitals: 


                                   Vital Signs











  Temp Pulse Resp BP Pulse Ox


 


 21 07:55  98.5 F  66  16  103/57  95


 


 21 05:00  98.3 F  86  18  112/65  96


 


 21 00:54  98.3 F  89  18  132/91  96


 


 21 19:59  98.5 F  95  18  122/86  97


 


 21 19:04   89  18  123/80  96








                                Intake and Output











 21





 22:59 06:59 14:59


 


Other:   


 


  Weight 111.13 kg  














- Constitutional


General appearance: no acute distress





- Respiratory


Respiratory: bilateral: CTA





- Cardiovascular


Rhythm: regular


Heart sounds: normal: S1, S2





Results





                                 21 18:00





                                 21 18:00


                                 Cardiac Enzymes











  21 Range/Units





  18:00 18:00 21:25 


 


AST  33    (17-59)  U/L


 


Troponin I   <0.012  <0.012  (0.000-0.034)  ng/mL














  21 Range/Units





  00:02 


 


AST   (17-59)  U/L


 


Troponin I  <0.012  (0.000-0.034)  ng/mL








                                   Coagulation











  21 Range/Units





  18:00 


 


PT  9.7  (9.0-12.0)  sec


 


APTT  22.4  (22.0-30.0)  sec








                                       CBC











  21 Range/Units





  18:00 


 


WBC  9.1  (3.8-10.6)  k/uL


 


RBC  4.90  (4.30-5.90)  m/uL


 


Hgb  15.7  (13.0-17.5)  gm/dL


 


Hct  44.9  (39.0-53.0)  %


 


Plt Count  261  (150-450)  k/uL








                          Comprehensive Metabolic Panel











  21 Range/Units





  18:00 


 


Sodium  139  (137-145)  mmol/L


 


Potassium  4.1  (3.5-5.1)  mmol/L


 


Chloride  103  ()  mmol/L


 


Carbon Dioxide  25  (22-30)  mmol/L


 


BUN  15  (9-20)  mg/dL


 


Creatinine  0.87  (0.66-1.25)  mg/dL


 


Glucose  137 H  (74-99)  mg/dL


 


Calcium  9.9  (8.4-10.2)  mg/dL


 


AST  33  (17-59)  U/L


 


ALT  48  (4-49)  U/L


 


Alkaline Phosphatase  70  ()  U/L


 


Total Protein  7.0  (6.3-8.2)  g/dL


 


Albumin  4.2  (3.5-5.0)  g/dL








                               Current Medications











Generic Name Dose Route Start Last Admin





  Trade Name Freq  PRN Reason Stop Dose Admin


 


Aspirin  325 mg  21 09:00  21 07:54





  Aspirin 325 Mg Tab  PO   325 mg





  DAILY TYLER   Administration


 


Barium Sulfate  450 ml  21 17:34 





  Barium Sulfate 450 Ml Oral.Susp Bottle  PO  21 17:35 





  Q3HR PRN  





  CT Scan  


 


Morphine Sulfate  4 mg  21 20:24  21 07:55





  Morphine Sulfate 4 Mg/Ml Syringe  IVP   4 mg





  Q6HR PRN   Administration





  Pain  


 


Nitroglycerin  0.4 mg  21 20:12 





  Nitroglycerin Sl Tabs 0.4 Mg Tab  SUBLINGUAL  





  Q5M PRN  





  Chest Pain  


 


Nitroglycerin  1 inch  21 00:00  21 07:15





  Nitroglycerin Oint 1 Inch/Gm Packet  TOPICAL   Not Given





  Q6HR TYLER  


 


Ondansetron HCl  4 mg  21 00:45  21 01:07





  Ondansetron 4 Mg/2 Ml Vial  IVP   4 mg





  Q6HR PRN   Administration





  Nausea And Vomiting  


 


Pantoprazole Sodium  40 mg  21 20:15  21 07:54





  Pantoprazole 40 Mg/10 Ml Vial  IVP   40 mg





  DAILY TYLER   Administration








                                Intake and Output











 21





 22:59 06:59 14:59


 


Other:   


 


  Weight 111.13 kg  








                                        





                                 21 18:00 





                                 21 18:00 











Assessment and Plan


Assessment: 





Assessment


#1 epigastric discomfort


#2 status post laparoscopic procedure





Plan


#1 acute coronary syndrome was ruled out


#2 I will wait on any further cardiac workup at this point until we rule out any

gastrointestinal etiology for his discomfort





Thank you for allowing us participate in his care

## 2021-09-26 ENCOUNTER — HOSPITAL ENCOUNTER (EMERGENCY)
Dept: HOSPITAL 47 - EC | Age: 41
Discharge: HOME | End: 2021-09-26
Payer: COMMERCIAL

## 2021-09-26 VITALS — DIASTOLIC BLOOD PRESSURE: 83 MMHG | SYSTOLIC BLOOD PRESSURE: 118 MMHG | HEART RATE: 87 BPM | RESPIRATION RATE: 20 BRPM

## 2021-09-26 VITALS — TEMPERATURE: 98.1 F

## 2021-09-26 DIAGNOSIS — R10.12: Primary | ICD-10-CM

## 2021-09-26 DIAGNOSIS — Z91.040: ICD-10-CM

## 2021-09-26 DIAGNOSIS — Z90.49: ICD-10-CM

## 2021-09-26 DIAGNOSIS — R11.2: ICD-10-CM

## 2021-09-26 DIAGNOSIS — K21.9: ICD-10-CM

## 2021-09-26 DIAGNOSIS — Z88.5: ICD-10-CM

## 2021-09-26 LAB
ALBUMIN SERPL-MCNC: 4.3 G/DL (ref 3.5–5)
ALP SERPL-CCNC: 81 U/L (ref 38–126)
ALT SERPL-CCNC: 64 U/L (ref 4–49)
ANION GAP SERPL CALC-SCNC: 11 MMOL/L
AST SERPL-CCNC: 138 U/L (ref 17–59)
BASOPHILS # BLD AUTO: 0 K/UL (ref 0–0.2)
BASOPHILS NFR BLD AUTO: 1 %
BUN SERPL-SCNC: 12 MG/DL (ref 9–20)
CALCIUM SPEC-MCNC: 9.6 MG/DL (ref 8.4–10.2)
CHLORIDE SERPL-SCNC: 107 MMOL/L (ref 98–107)
CO2 SERPL-SCNC: 22 MMOL/L (ref 22–30)
EOSINOPHIL # BLD AUTO: 0.1 K/UL (ref 0–0.7)
EOSINOPHIL NFR BLD AUTO: 1 %
ERYTHROCYTE [DISTWIDTH] IN BLOOD BY AUTOMATED COUNT: 4.83 M/UL (ref 4.3–5.9)
ERYTHROCYTE [DISTWIDTH] IN BLOOD: 13 % (ref 11.5–15.5)
GLUCOSE SERPL-MCNC: 105 MG/DL (ref 74–99)
HCT VFR BLD AUTO: 44.1 % (ref 39–53)
HGB BLD-MCNC: 14.9 GM/DL (ref 13–17.5)
LYMPHOCYTES # SPEC AUTO: 1.4 K/UL (ref 1–4.8)
LYMPHOCYTES NFR SPEC AUTO: 20 %
MCH RBC QN AUTO: 30.8 PG (ref 25–35)
MCHC RBC AUTO-ENTMCNC: 33.7 G/DL (ref 31–37)
MCV RBC AUTO: 91.4 FL (ref 80–100)
MONOCYTES # BLD AUTO: 0.4 K/UL (ref 0–1)
MONOCYTES NFR BLD AUTO: 6 %
NEUTROPHILS # BLD AUTO: 4.7 K/UL (ref 1.3–7.7)
NEUTROPHILS NFR BLD AUTO: 71 %
PH UR: 5.5 [PH] (ref 5–8)
PLATELET # BLD AUTO: 271 K/UL (ref 150–450)
POTASSIUM SERPL-SCNC: 4.1 MMOL/L (ref 3.5–5.1)
PROT SERPL-MCNC: 7.2 G/DL (ref 6.3–8.2)
SODIUM SERPL-SCNC: 140 MMOL/L (ref 137–145)
SP GR UR: 1.03 (ref 1–1.03)
UROBILINOGEN UR QL STRIP: <2 MG/DL (ref ?–2)
WBC # BLD AUTO: 6.6 K/UL (ref 3.8–10.6)

## 2021-09-26 PROCEDURE — 96376 TX/PRO/DX INJ SAME DRUG ADON: CPT

## 2021-09-26 PROCEDURE — 96361 HYDRATE IV INFUSION ADD-ON: CPT

## 2021-09-26 PROCEDURE — 81003 URINALYSIS AUTO W/O SCOPE: CPT

## 2021-09-26 PROCEDURE — 96375 TX/PRO/DX INJ NEW DRUG ADDON: CPT

## 2021-09-26 PROCEDURE — 36415 COLL VENOUS BLD VENIPUNCTURE: CPT

## 2021-09-26 PROCEDURE — 80053 COMPREHEN METABOLIC PANEL: CPT

## 2021-09-26 PROCEDURE — 85025 COMPLETE CBC W/AUTO DIFF WBC: CPT

## 2021-09-26 PROCEDURE — 96374 THER/PROPH/DIAG INJ IV PUSH: CPT

## 2021-09-26 PROCEDURE — 99284 EMERGENCY DEPT VISIT MOD MDM: CPT

## 2021-09-26 NOTE — ED
General Adult HPI





- General


Chief complaint: Nausea/Vomiting/Diarrhea


Stated complaint: Chest Pain/Vomiting


Time Seen by Provider: 21 10:19


Source: patient


Mode of arrival: wheelchair


Limitations: no limitations





- History of Present Illness


Initial comments: 





This is a 41-year-old male with history of known hiatal hernia who presents to 

the emergency department with complaints of nausea, vomiting, epigastric and 

left upper quadrant abdominal pain.  Patient states he has had symptoms since 

August, but the discomfort has been persistent since Thursday and he is now 

concerned about blood in emesis.  States he noticed a few red streaks and dark f

lecks in his emesis this morning.  Denies dark stools or hematochezia; states he

is not feeling dizzy or lightheaded.  Reports consuming a liquid diet in effort 

to reduce food bulk but has not noticed any improvement.  Does state that he is 

scheduled to see a provider at [facility] on  for surgical 

evaluation





- Related Data


                                Home Medications











 Medication  Instructions  Recorded  Confirmed


 


Cyclobenzaprine [Flexeril] 10 mg PO BID PRN 21


 


Temazepam [Restoril] 30 mg PO HS 21


 


traMADol HCL 50 mg PO TID PRN 21


 


Dicyclomine [Bentyl] 10 mg PO TID PRN 21


 


Pantoprazole Sodium [Protonix] 20 mg PO DAILY 21


 


HYDROcodone/APAP 7.5-325MG [Norco 1 tab PO Q8H PRN 21





7.5-325]   


 


Isosorbide Mononitrate [Isosorbide 30 mg PO DAILY 21





Mononitrate ER]   








                                  Previous Rx's











 Medication  Instructions  Recorded


 


Citalopram Hydrobromide [CeleXA] 20 mg PO DAILY #30 tab 21


 


Ondansetron HCl [Zofran] 4 mg PO Q8H PRN #40 tab 21


 


Sucralfate [Carafate] 1 gm PO ACHS #120 tablet 21











                                    Allergies











Allergy/AdvReac Type Severity Reaction Status Date / Time


 


hydromorphone [From Dilaudid] Allergy  Rash/Hives Verified 21 10:18


 


latex Allergy  Rash/Hives Verified 21 10:18


 


meperidine [From Demerol] Allergy  Rash/Hives Verified 21 10:18














Review of Systems


ROS Statement: 


Those systems with pertinent positive or pertinent negative responses have been 

documented in the HPI.





ROS Other: All systems not noted in ROS Statement are negative.





Past Medical History


Past Medical History: GERD/Reflux


Additional Past Medical History / Comment(s): multiple dislocation of rt 

shoulder, hiatal hernia, hx migraines, IBS


History of Any Multi-Drug Resistant Organisms: None Reported


Past Surgical History: Appendectomy, Back Surgery, Cholecystectomy, Orthopedic 

Surgery, Tonsillectomy


Additional Past Surgical History / Comment(s): rt shoulder arthroscopy, left 

shoulder fusion,


Past Anesthesia/Blood Transfusion Reactions: No Reported Reaction


Past Psychological History: No Psychological Hx Reported


Smoking Status: Never smoker


Past Alcohol Use History: None Reported


Past Drug Use History: None Reported





- Past Family History


  ** Mother


Additional Family Medical History / Comment(s): lupus, 





  ** Father


History Unknown: Yes





  ** Brother(s)


Additional Family Medical History / Comment(s): autoimmune disorder





General Exam


Limitations: no limitations (Well-developed, well-nourished male in no acute 

distress)


General appearance: alert, in no apparent distress


ENT exam: Present: normal exam, mucous membranes moist


Respiratory exam: Present: normal lung sounds bilaterally.  Absent: respiratory 

distress, wheezes, rales, rhonchi, stridor


Cardiovascular Exam: Present: regular rate, normal rhythm, normal heart sounds. 

Absent: systolic murmur, diastolic murmur, rubs, gallop, clicks


GI/Abdominal exam: Present: soft, tenderness (Left upper quadrant along the low

er rib border), normal bowel sounds.  Absent: guarding, rebound, rigid


Back exam: Present: normal inspection.  Absent: CVA tenderness (L)


Neurological exam: Present: alert, oriented X3, CN II-XII intact


Psychiatric exam: Present: normal affect, normal mood


Skin exam: Present: warm, dry, intact, normal color.  Absent: rash





Course


                                   Vital Signs











  21





  10:14


 


Temperature 98.1 F


 


Pulse Rate 98


 


Respiratory 18





Rate 


 


Blood Pressure 119/86


 


O2 Sat by Pulse 95





Oximetry 














- Reevaluation(s)


Reevaluation #1: 





21 12:53


Patient reports nausea has improved but continues to have left upper quadrant 

pain; asking for additional pain treatment.





Medical Decision Making





- Medical Decision Making





41-year-old male with a history of hiatal hernia is evaluated for complaints of 

abdominal pain, nausea, and vomiting.  Patient was concerned about having an 

episode of emesis with a red streak and was worried about bleeding.  Patient is 

hemodynamically stable, his abdomen is soft with no rebound tenderness or 

guarding.  Patient has had no episodes of vomiting while present in the 

department.  Reports moderate improvement of pain and nausea with treatment.  No

significant lab abnormalities.  Patient is instructed to keep his scheduled 

appointment at [facility].  Return parameters were discussed in 

detail patient verbalizes understanding.





- Lab Data


Result diagrams: 


                                 21 10:51





                                 21 10:51


                                   Lab Results











  21 Range/Units





  10:51 10:51 10:51 


 


WBC  6.6    (3.8-10.6)  k/uL


 


RBC  4.83    (4.30-5.90)  m/uL


 


Hgb  14.9    (13.0-17.5)  gm/dL


 


Hct  44.1    (39.0-53.0)  %


 


MCV  91.4    (80.0-100.0)  fL


 


MCH  30.8    (25.0-35.0)  pg


 


MCHC  33.7    (31.0-37.0)  g/dL


 


RDW  13.0    (11.5-15.5)  %


 


Plt Count  271    (150-450)  k/uL


 


MPV  7.4    


 


Neutrophils %  71    %


 


Lymphocytes %  20    %


 


Monocytes %  6    %


 


Eosinophils %  1    %


 


Basophils %  1    %


 


Neutrophils #  4.7    (1.3-7.7)  k/uL


 


Lymphocytes #  1.4    (1.0-4.8)  k/uL


 


Monocytes #  0.4    (0-1.0)  k/uL


 


Eosinophils #  0.1    (0-0.7)  k/uL


 


Basophils #  0.0    (0-0.2)  k/uL


 


Sodium    140  (137-145)  mmol/L


 


Potassium    4.1  (3.5-5.1)  mmol/L


 


Chloride    107  ()  mmol/L


 


Carbon Dioxide    22  (22-30)  mmol/L


 


Anion Gap    11  mmol/L


 


BUN    12  (9-20)  mg/dL


 


Creatinine    0.73  (0.66-1.25)  mg/dL


 


Est GFR (CKD-EPI)AfAm    >90  (>60 ml/min/1.73 sqM)  


 


Est GFR (CKD-EPI)NonAf    >90  (>60 ml/min/1.73 sqM)  


 


Glucose    105 H  (74-99)  mg/dL


 


Calcium    9.6  (8.4-10.2)  mg/dL


 


Total Bilirubin    0.8  (0.2-1.3)  mg/dL


 


AST    138 H  (17-59)  U/L


 


ALT    64 H  (4-49)  U/L


 


Alkaline Phosphatase    81  ()  U/L


 


Total Protein    7.2  (6.3-8.2)  g/dL


 


Albumin    4.3  (3.5-5.0)  g/dL


 


Urine Color   Yellow   


 


Urine Appearance   Clear   (Clear)  


 


Urine pH   5.5   (5.0-8.0)  


 


Ur Specific Gravity   1.026   (1.001-1.035)  


 


Urine Protein   Trace H   (Negative)  


 


Urine Glucose (UA)   Negative   (Negative)  


 


Urine Ketones   Negative   (Negative)  


 


Urine Blood   Negative   (Negative)  


 


Urine Nitrite   Negative   (Negative)  


 


Urine Bilirubin   Negative   (Negative)  


 


Urine Urobilinogen   <2.0   (<2.0)  mg/dL


 


Ur Leukocyte Esterase   Negative   (Negative)  














Disposition


Clinical Impression: 


 Abdominal pain, Nausea & vomiting





Disposition: HOME SELF-CARE


Condition: Stable


Instructions (If sedation given, give patient instructions):  Acute Nausea and 

Vomiting (ED), Abdominal Pain (ED)


Additional Instructions: 


Continue bland diet and eat small frequent meals.  Keep follow-up appointment at

[facility] as scheduled.  Return to the emergency department with 

any new, worsening, or concerning symptoms.


Is patient prescribed a controlled substance at d/c from ED?: No


Referrals: 


Tyra Marvin MD [Primary Care Provider] - 1-2 days


Time of Disposition: 14:49

## 2021-10-07 ENCOUNTER — HOSPITAL ENCOUNTER (EMERGENCY)
Dept: HOSPITAL 47 - EC | Age: 41
Discharge: HOME | End: 2021-10-07
Payer: COMMERCIAL

## 2021-10-07 VITALS — SYSTOLIC BLOOD PRESSURE: 125 MMHG | RESPIRATION RATE: 16 BRPM | HEART RATE: 78 BPM | DIASTOLIC BLOOD PRESSURE: 83 MMHG

## 2021-10-07 VITALS — TEMPERATURE: 98.4 F

## 2021-10-07 DIAGNOSIS — R11.2: ICD-10-CM

## 2021-10-07 DIAGNOSIS — K21.9: ICD-10-CM

## 2021-10-07 DIAGNOSIS — R07.9: Primary | ICD-10-CM

## 2021-10-07 DIAGNOSIS — Z79.899: ICD-10-CM

## 2021-10-07 DIAGNOSIS — Z90.49: ICD-10-CM

## 2021-10-07 DIAGNOSIS — Z88.5: ICD-10-CM

## 2021-10-07 DIAGNOSIS — R10.9: ICD-10-CM

## 2021-10-07 LAB
ALBUMIN SERPL-MCNC: 4.6 G/DL (ref 3.5–5)
ALP SERPL-CCNC: 81 U/L (ref 38–126)
ALT SERPL-CCNC: 38 U/L (ref 4–49)
AMYLASE SERPL-CCNC: 68 U/L (ref 30–110)
ANION GAP SERPL CALC-SCNC: 11 MMOL/L
AST SERPL-CCNC: 28 U/L (ref 17–59)
BASOPHILS # BLD AUTO: 0.1 K/UL (ref 0–0.2)
BASOPHILS NFR BLD AUTO: 1 %
BUN SERPL-SCNC: 18 MG/DL (ref 9–20)
CALCIUM SPEC-MCNC: 10.5 MG/DL (ref 8.4–10.2)
CHLORIDE SERPL-SCNC: 103 MMOL/L (ref 98–107)
CO2 SERPL-SCNC: 26 MMOL/L (ref 22–30)
EOSINOPHIL # BLD AUTO: 0.1 K/UL (ref 0–0.7)
EOSINOPHIL NFR BLD AUTO: 2 %
ERYTHROCYTE [DISTWIDTH] IN BLOOD BY AUTOMATED COUNT: 5.14 M/UL (ref 4.3–5.9)
ERYTHROCYTE [DISTWIDTH] IN BLOOD: 13 % (ref 11.5–15.5)
GLUCOSE SERPL-MCNC: 98 MG/DL (ref 74–99)
HCT VFR BLD AUTO: 47.6 % (ref 39–53)
HGB BLD-MCNC: 16.2 GM/DL (ref 13–17.5)
LIPASE SERPL-CCNC: 261 U/L (ref 23–300)
LYMPHOCYTES # SPEC AUTO: 2.7 K/UL (ref 1–4.8)
LYMPHOCYTES NFR SPEC AUTO: 38 %
MCH RBC QN AUTO: 31.4 PG (ref 25–35)
MCHC RBC AUTO-ENTMCNC: 34 G/DL (ref 31–37)
MCV RBC AUTO: 92.5 FL (ref 80–100)
MONOCYTES # BLD AUTO: 0.6 K/UL (ref 0–1)
MONOCYTES NFR BLD AUTO: 8 %
NEUTROPHILS # BLD AUTO: 3.3 K/UL (ref 1.3–7.7)
NEUTROPHILS NFR BLD AUTO: 48 %
PLATELET # BLD AUTO: 307 K/UL (ref 150–450)
POTASSIUM SERPL-SCNC: 4.3 MMOL/L (ref 3.5–5.1)
PROT SERPL-MCNC: 7.9 G/DL (ref 6.3–8.2)
SODIUM SERPL-SCNC: 140 MMOL/L (ref 137–145)
WBC # BLD AUTO: 7 K/UL (ref 3.8–10.6)

## 2021-10-07 PROCEDURE — 96361 HYDRATE IV INFUSION ADD-ON: CPT

## 2021-10-07 PROCEDURE — 96374 THER/PROPH/DIAG INJ IV PUSH: CPT

## 2021-10-07 PROCEDURE — 99285 EMERGENCY DEPT VISIT HI MDM: CPT

## 2021-10-07 PROCEDURE — 83690 ASSAY OF LIPASE: CPT

## 2021-10-07 PROCEDURE — 36415 COLL VENOUS BLD VENIPUNCTURE: CPT

## 2021-10-07 PROCEDURE — 85025 COMPLETE CBC W/AUTO DIFF WBC: CPT

## 2021-10-07 PROCEDURE — 74018 RADEX ABDOMEN 1 VIEW: CPT

## 2021-10-07 PROCEDURE — 96375 TX/PRO/DX INJ NEW DRUG ADDON: CPT

## 2021-10-07 PROCEDURE — 80053 COMPREHEN METABOLIC PANEL: CPT

## 2021-10-07 PROCEDURE — 82150 ASSAY OF AMYLASE: CPT

## 2021-10-07 NOTE — ED
Recheck HPI





- General


Chief Complaint: Chest Pain


Stated Complaint: Chest pain


Time Seen by Provider: 10/07/21 03:32


Source: patient, RN notes reviewed, old records reviewed


Mode of arrival: ambulatory


Limitations: no limitations





- History of Present Illness


Initial Comments: 





This is a 41-year-old male with severe abdominal pain.  Patient presents for r

ecurrent evaluation regarding abdominal pain in for evaluation pain control.  

Patient has known history of significant hiatal hernia likely thoracic hiatal 

hernia with inability to pass both solids and liquids patient currently does 

have feeding tube is on a feeding tube.  Having severe pain generalized pain 

bodyaches and pains


MD Complaint: medication refill request


-: days(s)


Returns Today for: persistent/worsening pain related to initial visit


Symptoms Since Prior Visit: worsening pain


Associated Symptoms: none


Treatments Prior to Arrival: Given Pain Meds on





- Related Data


                                Home Medications











 Medication  Instructions  Recorded  Confirmed


 


Cyclobenzaprine [Flexeril] 10 mg PO BID PRN 21


 


Temazepam [Restoril] 30 mg PO HS 21


 


traMADol HCL 50 mg PO TID PRN 21


 


Dicyclomine [Bentyl] 10 mg PO TID PRN 21


 


Pantoprazole Sodium [Protonix] 20 mg PO DAILY 21


 


HYDROcodone/APAP 7.5-325MG [Norco 1 tab PO Q8H PRN 21





7.5-325]   


 


Isosorbide Mononitrate [Isosorbide 30 mg PO DAILY 21





Mononitrate ER]   








                                  Previous Rx's











 Medication  Instructions  Recorded


 


Citalopram Hydrobromide [CeleXA] 20 mg PO DAILY #30 tab 21


 


Ondansetron HCl [Zofran] 4 mg PO Q8H PRN #40 tab 21


 


Sucralfate [Carafate] 1 gm PO ACHS #120 tablet 21











                                    Allergies











Allergy/AdvReac Type Severity Reaction Status Date / Time


 


hydromorphone [From Dilaudid] Allergy  Rash/Hives Verified 10/07/21 03:40


 


latex Allergy  Rash/Hives Verified 10/07/21 03:40


 


meperidine [From Demerol] Allergy  Rash/Hives Verified 10/07/21 03:40














Review of Systems


ROS Statement: 


Those systems with pertinent positive or pertinent negative responses have been 

documented in the HPI.





ROS Other: All systems not noted in ROS Statement are negative.





Past Medical History


Past Medical History: GERD/Reflux


Additional Past Medical History / Comment(s): multiple dislocation of rt 

shoulder, hiatal hernia, hx migraines, IBS


History of Any Multi-Drug Resistant Organisms: None Reported


Past Surgical History: Appendectomy, Back Surgery, Cholecystectomy, Orthopedic 

Surgery, Tonsillectomy


Additional Past Surgical History / Comment(s): rt shoulder arthroscopy, left 

shoulder fusion,


Past Anesthesia/Blood Transfusion Reactions: No Reported Reaction


Past Psychological History: No Psychological Hx Reported


Smoking Status: Never smoker


Past Alcohol Use History: None Reported


Past Drug Use History: None Reported





- Past Family History


  ** Mother


Additional Family Medical History / Comment(s): lupus, 





  ** Father


History Unknown: Yes





  ** Brother(s)


Additional Family Medical History / Comment(s): autoimmune disorder





General Exam


Limitations: no limitations


General appearance: alert, in no apparent distress


Head exam: Present: atraumatic, normocephalic, normal inspection


Eye exam: Present: normal appearance, PERRL, EOMI.  Absent: scleral icterus, 

conjunctival injection, periorbital swelling


ENT exam: Present: normal exam, mucous membranes moist


Neck exam: Present: normal inspection.  Absent: tenderness, meningismus, 

lymphadenopathy


Respiratory exam: Present: normal lung sounds bilaterally.  Absent: respiratory 

distress, wheezes, rales, rhonchi, stridor


Cardiovascular Exam: Present: regular rate, normal rhythm, normal heart sounds. 

Absent: systolic murmur, diastolic murmur, rubs, gallop, clicks


GI/Abdominal exam: Present: soft, normal bowel sounds.  Absent: distended, 

tenderness, guarding, rebound, rigid


Extremities exam: Present: normal inspection, full ROM, normal capillary refill.

 Absent: tenderness, pedal edema, joint swelling, calf tenderness


Back exam: Present: normal inspection


Neurological exam: Present: alert, oriented X3, CN II-XII intact


Psychiatric exam: Present: normal affect, normal mood


Skin exam: Present: warm, dry, intact, normal color.  Absent: rash





Course


                                   Vital Signs











  10/07/21 10/07/21





  03:34 05:08


 


Temperature 98.4 F 


 


Pulse Rate 80 68


 


Respiratory 22 18





Rate  


 


Blood Pressure 123/78 123/82


 


O2 Sat by Pulse 96 98





Oximetry  














- Reevaluation(s)


Reevaluation #1: 





10/07/21 04:04


Record is reviewed


Reevaluation #2: 





10/07/21 05:30


Patient is currently feeling much improved


Reevaluation #3: 





10/07/21 05:30


Patient feels good for discharge home





Medical Decision Making





- Medical Decision Making





41 male DF for evaluation of acute on chronic pain.  Pain is well-controlled 

currently.  We'll continue follow-up as an outpatient regarding pain control





- Lab Data


Result diagrams: 


                                 10/07/21 04:06





                                 10/07/21 04:06


                                   Lab Results











  10/07/21 10/07/21 Range/Units





  04:06 04:06 


 


WBC  7.0   (3.8-10.6)  k/uL


 


RBC  5.14   (4.30-5.90)  m/uL


 


Hgb  16.2   (13.0-17.5)  gm/dL


 


Hct  47.6   (39.0-53.0)  %


 


MCV  92.5   (80.0-100.0)  fL


 


MCH  31.4   (25.0-35.0)  pg


 


MCHC  34.0   (31.0-37.0)  g/dL


 


RDW  13.0   (11.5-15.5)  %


 


Plt Count  307   (150-450)  k/uL


 


MPV  7.8   


 


Neutrophils %  48   %


 


Lymphocytes %  38   %


 


Monocytes %  8   %


 


Eosinophils %  2   %


 


Basophils %  1   %


 


Neutrophils #  3.3   (1.3-7.7)  k/uL


 


Lymphocytes #  2.7   (1.0-4.8)  k/uL


 


Monocytes #  0.6   (0-1.0)  k/uL


 


Eosinophils #  0.1   (0-0.7)  k/uL


 


Basophils #  0.1   (0-0.2)  k/uL


 


Sodium   140  (137-145)  mmol/L


 


Potassium   4.3  (3.5-5.1)  mmol/L


 


Chloride   103  ()  mmol/L


 


Carbon Dioxide   26  (22-30)  mmol/L


 


Anion Gap   11  mmol/L


 


BUN   18  (9-20)  mg/dL


 


Creatinine   0.78  (0.66-1.25)  mg/dL


 


Est GFR (CKD-EPI)AfAm   >90  (>60 ml/min/1.73 sqM)  


 


Est GFR (CKD-EPI)NonAf   >90  (>60 ml/min/1.73 sqM)  


 


Glucose   98  (74-99)  mg/dL


 


Calcium   10.5 H  (8.4-10.2)  mg/dL


 


Total Bilirubin   0.5  (0.2-1.3)  mg/dL


 


AST   28  (17-59)  U/L


 


ALT   38  (4-49)  U/L


 


Alkaline Phosphatase   81  ()  U/L


 


Total Protein   7.9  (6.3-8.2)  g/dL


 


Albumin   4.6  (3.5-5.0)  g/dL


 


Amylase   68  ()  U/L


 


Lipase   261  ()  U/L














- Radiology Data


Radiology results: report reviewed (X-ray KUB is negative for acute disease), 

image reviewed





Disposition


Clinical Impression: 


 Chest pain, Abdominal pain, Nausea & vomiting





Disposition: HOME SELF-CARE


Condition: Good


Instructions (If sedation given, give patient instructions):  Abdominal Pain 

(ED)


Is patient prescribed a controlled substance at d/c from ED?: No


Referrals: 


Tyra Marvin MD [Primary Care Provider] - 1-2 days

## 2021-10-07 NOTE — XR
EXAMINATION TYPE: XR KUB

 

DATE OF EXAM: 10/7/2021

 

COMPARISON: NONE

 

HISTORY: Chest pain

 

TECHNIQUE: 2 views upright

 

FINDINGS: Bowel gas pattern is normal. There is no sign of intestinal obstruction or pneumoperitoneum
. There is some contrast in the left colon. There is nasogastric tube probably in the fourth part of 
the duodenum. There is posterior fusion surgery in the lower lumbar spine. There is no evidence of ab
dominal mass. I see no calcifications over the kidneys.

 

IMPRESSION: Nonacute abdomen.

## 2021-10-17 ENCOUNTER — HOSPITAL ENCOUNTER (EMERGENCY)
Dept: HOSPITAL 47 - EC | Age: 41
LOS: 1 days | Discharge: OTHER INSTITUTION | End: 2021-10-18
Payer: COMMERCIAL

## 2021-10-17 VITALS — HEART RATE: 63 BPM | DIASTOLIC BLOOD PRESSURE: 67 MMHG | SYSTOLIC BLOOD PRESSURE: 115 MMHG

## 2021-10-17 VITALS — TEMPERATURE: 98.3 F | RESPIRATION RATE: 20 BRPM

## 2021-10-17 DIAGNOSIS — Z79.899: ICD-10-CM

## 2021-10-17 DIAGNOSIS — R07.9: Primary | ICD-10-CM

## 2021-10-17 DIAGNOSIS — Z20.822: ICD-10-CM

## 2021-10-17 DIAGNOSIS — Z90.49: ICD-10-CM

## 2021-10-17 DIAGNOSIS — Z88.5: ICD-10-CM

## 2021-10-17 DIAGNOSIS — R42: ICD-10-CM

## 2021-10-17 DIAGNOSIS — R11.10: ICD-10-CM

## 2021-10-17 DIAGNOSIS — K58.9: ICD-10-CM

## 2021-10-17 DIAGNOSIS — K21.9: ICD-10-CM

## 2021-10-17 DIAGNOSIS — Z91.040: ICD-10-CM

## 2021-10-17 LAB
ALBUMIN SERPL-MCNC: 4.9 G/DL (ref 3.5–5)
ALP SERPL-CCNC: 84 U/L (ref 38–126)
ALT SERPL-CCNC: 55 U/L (ref 4–49)
ANION GAP SERPL CALC-SCNC: 11 MMOL/L
APTT BLD: 23.4 SEC (ref 22–30)
AST SERPL-CCNC: 36 U/L (ref 17–59)
BASOPHILS # BLD AUTO: 0 K/UL (ref 0–0.2)
BASOPHILS NFR BLD AUTO: 1 %
BUN SERPL-SCNC: 19 MG/DL (ref 9–20)
CALCIUM SPEC-MCNC: 10.1 MG/DL (ref 8.4–10.2)
CHLORIDE SERPL-SCNC: 106 MMOL/L (ref 98–107)
CO2 SERPL-SCNC: 23 MMOL/L (ref 22–30)
EOSINOPHIL # BLD AUTO: 0.2 K/UL (ref 0–0.7)
EOSINOPHIL NFR BLD AUTO: 2 %
ERYTHROCYTE [DISTWIDTH] IN BLOOD BY AUTOMATED COUNT: 5.2 M/UL (ref 4.3–5.9)
ERYTHROCYTE [DISTWIDTH] IN BLOOD: 13.3 % (ref 11.5–15.5)
GLUCOSE SERPL-MCNC: 93 MG/DL (ref 74–99)
HCT VFR BLD AUTO: 47.7 % (ref 39–53)
HGB BLD-MCNC: 16.2 GM/DL (ref 13–17.5)
INR PPP: 0.9 (ref ?–1.2)
LIPASE SERPL-CCNC: 239 U/L (ref 23–300)
LYMPHOCYTES # SPEC AUTO: 2 K/UL (ref 1–4.8)
LYMPHOCYTES NFR SPEC AUTO: 28 %
MAGNESIUM SPEC-SCNC: 2.2 MG/DL (ref 1.6–2.3)
MCH RBC QN AUTO: 31.2 PG (ref 25–35)
MCHC RBC AUTO-ENTMCNC: 34 G/DL (ref 31–37)
MCV RBC AUTO: 91.7 FL (ref 80–100)
MONOCYTES # BLD AUTO: 0.5 K/UL (ref 0–1)
MONOCYTES NFR BLD AUTO: 6 %
NEUTROPHILS # BLD AUTO: 4.4 K/UL (ref 1.3–7.7)
NEUTROPHILS NFR BLD AUTO: 61 %
PLATELET # BLD AUTO: 277 K/UL (ref 150–450)
POTASSIUM SERPL-SCNC: 4.4 MMOL/L (ref 3.5–5.1)
PROT SERPL-MCNC: 8.1 G/DL (ref 6.3–8.2)
PT BLD: 9.9 SEC (ref 9–12)
SODIUM SERPL-SCNC: 140 MMOL/L (ref 137–145)
WBC # BLD AUTO: 7.2 K/UL (ref 3.8–10.6)

## 2021-10-17 PROCEDURE — 85730 THROMBOPLASTIN TIME PARTIAL: CPT

## 2021-10-17 PROCEDURE — 96375 TX/PRO/DX INJ NEW DRUG ADDON: CPT

## 2021-10-17 PROCEDURE — 99285 EMERGENCY DEPT VISIT HI MDM: CPT

## 2021-10-17 PROCEDURE — 83880 ASSAY OF NATRIURETIC PEPTIDE: CPT

## 2021-10-17 PROCEDURE — 83690 ASSAY OF LIPASE: CPT

## 2021-10-17 PROCEDURE — 83735 ASSAY OF MAGNESIUM: CPT

## 2021-10-17 PROCEDURE — 96376 TX/PRO/DX INJ SAME DRUG ADON: CPT

## 2021-10-17 PROCEDURE — 96374 THER/PROPH/DIAG INJ IV PUSH: CPT

## 2021-10-17 PROCEDURE — 96361 HYDRATE IV INFUSION ADD-ON: CPT

## 2021-10-17 PROCEDURE — 36415 COLL VENOUS BLD VENIPUNCTURE: CPT

## 2021-10-17 PROCEDURE — 84484 ASSAY OF TROPONIN QUANT: CPT

## 2021-10-17 PROCEDURE — 85025 COMPLETE CBC W/AUTO DIFF WBC: CPT

## 2021-10-17 PROCEDURE — 93005 ELECTROCARDIOGRAM TRACING: CPT

## 2021-10-17 PROCEDURE — 85610 PROTHROMBIN TIME: CPT

## 2021-10-17 PROCEDURE — 80053 COMPREHEN METABOLIC PANEL: CPT

## 2021-10-17 PROCEDURE — 71046 X-RAY EXAM CHEST 2 VIEWS: CPT

## 2021-10-17 PROCEDURE — 87635 SARS-COV-2 COVID-19 AMP PRB: CPT

## 2021-10-17 RX ADMIN — ASPIRIN STA: 300 SUPPOSITORY RECTAL at 18:11

## 2021-10-17 NOTE — XR
EXAMINATION TYPE: XR chest 2V

 

DATE OF EXAM: 10/17/2021

 

COMPARISON: September 2, 2021

 

HISTORY: Chest pain

 

TECHNIQUE:

 

FINDINGS: Heart and mediastinum are normal. Lungs are clear. Diaphragm is normal. There is small line
ar density left lower lobe. There is right shoulder prosthesis. There are no hilar masses. There are 
chest leads. There is no evidence of pleural effusion.

 

IMPRESSION: Minimal scarring or atelectasis left lower lobe. There is improved aeration of the left l
gracie base compared to old exam. Normal heart.

## 2021-10-17 NOTE — ED
General Adult HPI





- General


Chief complaint: Chest Pain


Stated complaint: chest pain


Time Seen by Provider: 10/17/21 17:41


Source: patient


Mode of arrival: ambulatory


Limitations: no limitations





- History of Present Illness


Initial comments: 


Patient presents to the ED complaining of having constant chest heaviness 

radiating to his left jaw and left shoulder since last night.  Patient also 

states that he has felt lightheaded today.  Patient has a nasogastric feeding 

tube in place.  Patient states that he has a large hiatal hernia, and he states 

that he had a feeding tube placed several weeks ago at [facility].  Patient states that for the past 4 days, any feedings that he puts 

down his feeding tube, he has been vomiting up.  Patient states that he is not 

able to swallow anything by mouth.  Patient denies trauma or injury, fever or 

chills, headache, focal neuro deficit, pleuritic pain, dyspnea, cough or cold 

symptoms, palpitations, syncope, abdominal pain, diarrhea or constipation, 

hematemesis, dysuria or urinary symptoms, leg or calf swelling or pain, or any 

other symptoms or complaints.





- Related Data


                                Home Medications











 Medication  Instructions  Recorded  Confirmed


 


Temazepam [Restoril] 30 mg PO HS 08/16/21 10/17/21


 


HYDROcodone/APAP [Norco Elixir 15 ml PO Q6HR PRN 10/17/21 10/17/21





7.5-325Mg/15Ml]   


 


Metoclopramide HCl [Reglan] 10 mg PO QID 10/17/21 10/17/21








                                  Previous Rx's











 Medication  Instructions  Recorded


 


Sucralfate [Carafate] 1 gm PO ACHS #120 tablet 21











                                    Allergies











Allergy/AdvReac Type Severity Reaction Status Date / Time


 


hydromorphone [From Dilaudid] Allergy  Rash/Hives Verified 10/17/21 19:20


 


latex Allergy  Rash/Hives Verified 10/17/21 19:20


 


meperidine [From Demerol] Allergy  Rash/Hives Verified 10/17/21 19:20














Review of Systems


ROS Statement: 


Those systems with pertinent positive or pertinent negative responses have been 

documented in the HPI.





ROS Other: All systems not noted in ROS Statement are negative.





Past Medical History


Past Medical History: GERD/Reflux


Additional Past Medical History / Comment(s): multiple dislocation of rt 

shoulder, hiatal hernia, hx migraines, IBS


History of Any Multi-Drug Resistant Organisms: None Reported


Past Surgical History: Appendectomy, Back Surgery, Cholecystectomy, Orthopedic 

Surgery, Tonsillectomy


Additional Past Surgical History / Comment(s): rt shoulder arthroscopy, left 

shoulder fusion,


Past Anesthesia/Blood Transfusion Reactions: No Reported Reaction


Past Psychological History: No Psychological Hx Reported


Smoking Status: Never smoker


Past Alcohol Use History: None Reported


Past Drug Use History: None Reported





- Past Family History


  ** Mother


Additional Family Medical History / Comment(s): lupus, 





  ** Father


History Unknown: Yes





  ** Brother(s)


Additional Family Medical History / Comment(s): autoimmune disorder





General Exam


Limitations: no limitations


General appearance: alert, in no apparent distress


Head exam: Present: atraumatic, normocephalic


Eye exam: Present: normal appearance, EOMI


ENT exam: Present: mucous membranes moist, other (Right-sided nasogastric 

feeding tube is in place)


Neck exam: Present: other (Regular is in midline)


Respiratory exam: Present: normal lung sounds bilaterally.  Absent: respiratory 

distress, wheezes, rales, rhonchi, stridor, chest wall tenderness


Cardiovascular Exam: Present: regular rate, normal rhythm, normal heart sounds, 

other (Normal radial pulses bilaterally)


GI/Abdominal exam: Present: soft, normal bowel sounds, other (Obese abdomen).  

Absent: distended, tenderness, guarding


Extremities exam: Present: other (Negative Homans sign bilaterally).  Absent: 

tenderness, pedal edema, calf tenderness


Neurological exam: Present: alert, oriented X3.  Absent: motor sensory deficit


Psychiatric exam: Present: normal affect, normal mood


Skin exam: Present: warm, dry, intact, normal color





Course


                                   Vital Signs











  10/17/21 10/17/21 10/17/21





  17:27 18:57 20:00


 


Temperature 97.8 F  


 


Pulse Rate 78 70 61


 


Respiratory 18 18 20





Rate   


 


Blood Pressure 116/77 113/67 111/91


 


O2 Sat by Pulse 98 98 98





Oximetry   














  10/17/21





  20:49


 


Temperature 98.3 F


 


Pulse Rate 64


 


Respiratory 20





Rate 


 


Blood Pressure 105/64


 


O2 Sat by Pulse 97





Oximetry 














- Reevaluation(s)


Reevaluation #1: 





10/17/21 19:06


Case, H&P and test results were discussed with Dr. Marvin who knows the patient 

very well.  She states that there is nothing that we can do for his hiatal 

hernia here.  She also states that the patient has been cleared by cardiology 

for his chest pain.  She recommends transferring the patient to [facility]

ospital where the patient already has an appointment scheduled with CT surgery. 

She has no further recommendations at this time.


10/17/21 19:56


Case was discussed with the [facility] transfer center nurse.  She 

states that they will call me back regarding transfer acceptance.


10/17/21 21:39


Case, H&P and test results were discussed with Dr. Vargas (ED physician at [facility]) and Dr. Reed (thoracic surgeon at [facility]).  Dr. Vargas has accepted ambulance transfer to the [facility] ED, and Dr. Reed has agreed to see the patient in consultation.


10/17/21 21:46


Patient remains alert and breathing comfortably with a normal room air oxygen 

saturation.  Patient is aware of his test results, and he agrees with ambulance 

transfer to the [facility] ED at this time.





EKG Findings





- EKG Comments:


EKG Findings:: Normal sinus rhythm, ventricular rate of 85 bpm, no ectopy, 

normal MO and QRS intervals, normal QT interval, normal axis, no ST or T-wave ab

normality, no significant change when compared to 10/07/2021 EKG





Medical Decision Making





- Medical Decision Making


Patient has normal and reassuring vital signs.  Patient's EKG, labs and chest x-

ray are fairly unremarkable.  Patient had a normal stress echocardiogram done 

about 6 months ago.  I do not think that the patient's chest pain is likely 

cardiac in etiology.  I suspect that the patient's symptoms are all related to 

his hiatal hernia and GI issues.  Given the patient's history and my discussion 

with Dr. Marvin (patient's PCP), we felt it was best for the patient be 

transferred to [facility] for further evaluation and 

management.  Patient was accepted for ambulance transfer to the [facility] ED by Dr. Vargas.





- Lab Data


Result diagrams: 


                                 10/17/21 18:03





                                 10/17/21 18:03


                                   Lab Results











  10/17/21 10/17/21 10/17/21 Range/Units





  18:03 18:03 18:03 


 


WBC  7.2    (3.8-10.6)  k/uL


 


RBC  5.20    (4.30-5.90)  m/uL


 


Hgb  16.2    (13.0-17.5)  gm/dL


 


Hct  47.7    (39.0-53.0)  %


 


MCV  91.7    (80.0-100.0)  fL


 


MCH  31.2    (25.0-35.0)  pg


 


MCHC  34.0    (31.0-37.0)  g/dL


 


RDW  13.3    (11.5-15.5)  %


 


Plt Count  277    (150-450)  k/uL


 


MPV  8.1    


 


Neutrophils %  61    %


 


Lymphocytes %  28    %


 


Monocytes %  6    %


 


Eosinophils %  2    %


 


Basophils %  1    %


 


Neutrophils #  4.4    (1.3-7.7)  k/uL


 


Lymphocytes #  2.0    (1.0-4.8)  k/uL


 


Monocytes #  0.5    (0-1.0)  k/uL


 


Eosinophils #  0.2    (0-0.7)  k/uL


 


Basophils #  0.0    (0-0.2)  k/uL


 


PT   9.9   (9.0-12.0)  sec


 


INR   0.9   (<1.2)  


 


APTT   23.4   (22.0-30.0)  sec


 


Sodium    140  (137-145)  mmol/L


 


Potassium    4.4  (3.5-5.1)  mmol/L


 


Chloride    106  ()  mmol/L


 


Carbon Dioxide    23  (22-30)  mmol/L


 


Anion Gap    11  mmol/L


 


BUN    19  (9-20)  mg/dL


 


Creatinine    0.83  (0.66-1.25)  mg/dL


 


Est GFR (CKD-EPI)AfAm    >90  (>60 ml/min/1.73 sqM)  


 


Est GFR (CKD-EPI)NonAf    >90  (>60 ml/min/1.73 sqM)  


 


Glucose    93  (74-99)  mg/dL


 


Calcium    10.1  (8.4-10.2)  mg/dL


 


Magnesium    2.2  (1.6-2.3)  mg/dL


 


Total Bilirubin    0.6  (0.2-1.3)  mg/dL


 


AST    36  (17-59)  U/L


 


ALT    55 H  (4-49)  U/L


 


Alkaline Phosphatase    84  ()  U/L


 


Troponin I     (0.000-0.034)  ng/mL


 


NT-Pro-B Natriuret Pep     pg/mL


 


Total Protein    8.1  (6.3-8.2)  g/dL


 


Albumin    4.9  (3.5-5.0)  g/dL


 


Lipase    239  ()  U/L














  10/17/21 10/17/21 Range/Units





  18:03 18:03 


 


WBC    (3.8-10.6)  k/uL


 


RBC    (4.30-5.90)  m/uL


 


Hgb    (13.0-17.5)  gm/dL


 


Hct    (39.0-53.0)  %


 


MCV    (80.0-100.0)  fL


 


MCH    (25.0-35.0)  pg


 


MCHC    (31.0-37.0)  g/dL


 


RDW    (11.5-15.5)  %


 


Plt Count    (150-450)  k/uL


 


MPV    


 


Neutrophils %    %


 


Lymphocytes %    %


 


Monocytes %    %


 


Eosinophils %    %


 


Basophils %    %


 


Neutrophils #    (1.3-7.7)  k/uL


 


Lymphocytes #    (1.0-4.8)  k/uL


 


Monocytes #    (0-1.0)  k/uL


 


Eosinophils #    (0-0.7)  k/uL


 


Basophils #    (0-0.2)  k/uL


 


PT    (9.0-12.0)  sec


 


INR    (<1.2)  


 


APTT    (22.0-30.0)  sec


 


Sodium    (137-145)  mmol/L


 


Potassium    (3.5-5.1)  mmol/L


 


Chloride    ()  mmol/L


 


Carbon Dioxide    (22-30)  mmol/L


 


Anion Gap    mmol/L


 


BUN    (9-20)  mg/dL


 


Creatinine    (0.66-1.25)  mg/dL


 


Est GFR (CKD-EPI)AfAm    (>60 ml/min/1.73 sqM)  


 


Est GFR (CKD-EPI)NonAf    (>60 ml/min/1.73 sqM)  


 


Glucose    (74-99)  mg/dL


 


Calcium    (8.4-10.2)  mg/dL


 


Magnesium    (1.6-2.3)  mg/dL


 


Total Bilirubin    (0.2-1.3)  mg/dL


 


AST    (17-59)  U/L


 


ALT    (4-49)  U/L


 


Alkaline Phosphatase    ()  U/L


 


Troponin I  <0.012   (0.000-0.034)  ng/mL


 


NT-Pro-B Natriuret Pep   16  pg/mL


 


Total Protein    (6.3-8.2)  g/dL


 


Albumin    (3.5-5.0)  g/dL


 


Lipase    ()  U/L














- Radiology Data


Radiology results: report reviewed (Chest x-ray: Minimal scarring or atelectasis

left lower lobe.  There is improved aeration at the left lung is compared to old

exam.  Normal heart.)





Disposition


Clinical Impression: 


 Chest pain, Vomiting





Disposition: OTHER INSTITUTION NOT DEFINED


Condition: Stable


Is patient prescribed a controlled substance at d/c from ED?: No


Referrals: 


Tyra Marvin MD [Primary Care Provider] - 1-2 days


Time of Disposition: 21:42





- Out of Hospital Transfer - Req. Specs


Out of Hospital Transfer - Requested Specifics: Other Emergency Center ([facility])

## 2022-01-30 ENCOUNTER — HOSPITAL ENCOUNTER (EMERGENCY)
Dept: HOSPITAL 47 - EC | Age: 42
Discharge: OTHER INSTITUTION | End: 2022-01-30
Payer: COMMERCIAL

## 2022-01-30 VITALS — SYSTOLIC BLOOD PRESSURE: 119 MMHG | HEART RATE: 79 BPM | DIASTOLIC BLOOD PRESSURE: 80 MMHG | TEMPERATURE: 97.7 F

## 2022-01-30 VITALS — RESPIRATION RATE: 18 BRPM

## 2022-01-30 DIAGNOSIS — Z20.822: ICD-10-CM

## 2022-01-30 DIAGNOSIS — Z88.1: ICD-10-CM

## 2022-01-30 DIAGNOSIS — K44.9: Primary | ICD-10-CM

## 2022-01-30 DIAGNOSIS — K21.9: ICD-10-CM

## 2022-01-30 DIAGNOSIS — Z91.040: ICD-10-CM

## 2022-01-30 DIAGNOSIS — R11.10: ICD-10-CM

## 2022-01-30 DIAGNOSIS — Z88.5: ICD-10-CM

## 2022-01-30 DIAGNOSIS — Z90.49: ICD-10-CM

## 2022-01-30 LAB
ALBUMIN SERPL-MCNC: 4.2 G/DL (ref 3.5–5)
ALP SERPL-CCNC: 80 U/L (ref 38–126)
ALT SERPL-CCNC: 22 U/L (ref 4–49)
ANION GAP SERPL CALC-SCNC: 8 MMOL/L
AST SERPL-CCNC: 24 U/L (ref 17–59)
BASOPHILS # BLD AUTO: 0 K/UL (ref 0–0.2)
BASOPHILS NFR BLD AUTO: 1 %
BUN SERPL-SCNC: 15 MG/DL (ref 9–20)
CALCIUM SPEC-MCNC: 9 MG/DL (ref 8.4–10.2)
CHLORIDE SERPL-SCNC: 109 MMOL/L (ref 98–107)
CO2 SERPL-SCNC: 23 MMOL/L (ref 22–30)
EOSINOPHIL # BLD AUTO: 0.1 K/UL (ref 0–0.7)
EOSINOPHIL NFR BLD AUTO: 2 %
ERYTHROCYTE [DISTWIDTH] IN BLOOD BY AUTOMATED COUNT: 4.95 M/UL (ref 4.3–5.9)
ERYTHROCYTE [DISTWIDTH] IN BLOOD: 14.5 % (ref 11.5–15.5)
GLUCOSE SERPL-MCNC: 170 MG/DL (ref 74–99)
HCT VFR BLD AUTO: 42.3 % (ref 39–53)
HGB BLD-MCNC: 13.2 GM/DL (ref 13–17.5)
LIPASE SERPL-CCNC: 188 U/L (ref 23–300)
LYMPHOCYTES # SPEC AUTO: 2 K/UL (ref 1–4.8)
LYMPHOCYTES NFR SPEC AUTO: 34 %
MCH RBC QN AUTO: 26.7 PG (ref 25–35)
MCHC RBC AUTO-ENTMCNC: 31.2 G/DL (ref 31–37)
MCV RBC AUTO: 85.5 FL (ref 80–100)
MONOCYTES # BLD AUTO: 0.3 K/UL (ref 0–1)
MONOCYTES NFR BLD AUTO: 6 %
NEUTROPHILS # BLD AUTO: 3.2 K/UL (ref 1.3–7.7)
NEUTROPHILS NFR BLD AUTO: 55 %
PH UR: 7 [PH] (ref 5–8)
PLATELET # BLD AUTO: 295 K/UL (ref 150–450)
POTASSIUM SERPL-SCNC: 4.1 MMOL/L (ref 3.5–5.1)
PROT SERPL-MCNC: 7.6 G/DL (ref 6.3–8.2)
SODIUM SERPL-SCNC: 140 MMOL/L (ref 137–145)
SP GR UR: >1.05 (ref 1–1.03)
UROBILINOGEN UR QL STRIP: <2 MG/DL (ref ?–2)
WBC # BLD AUTO: 5.8 K/UL (ref 3.8–10.6)

## 2022-01-30 PROCEDURE — 96375 TX/PRO/DX INJ NEW DRUG ADDON: CPT

## 2022-01-30 PROCEDURE — 74177 CT ABD & PELVIS W/CONTRAST: CPT

## 2022-01-30 PROCEDURE — 85025 COMPLETE CBC W/AUTO DIFF WBC: CPT

## 2022-01-30 PROCEDURE — 83605 ASSAY OF LACTIC ACID: CPT

## 2022-01-30 PROCEDURE — 96374 THER/PROPH/DIAG INJ IV PUSH: CPT

## 2022-01-30 PROCEDURE — 36415 COLL VENOUS BLD VENIPUNCTURE: CPT

## 2022-01-30 PROCEDURE — 80053 COMPREHEN METABOLIC PANEL: CPT

## 2022-01-30 PROCEDURE — 73030 X-RAY EXAM OF SHOULDER: CPT

## 2022-01-30 PROCEDURE — 83690 ASSAY OF LIPASE: CPT

## 2022-01-30 PROCEDURE — 99285 EMERGENCY DEPT VISIT HI MDM: CPT

## 2022-01-30 PROCEDURE — 96361 HYDRATE IV INFUSION ADD-ON: CPT

## 2022-01-30 PROCEDURE — 87635 SARS-COV-2 COVID-19 AMP PRB: CPT

## 2022-01-30 PROCEDURE — 81003 URINALYSIS AUTO W/O SCOPE: CPT

## 2022-01-30 PROCEDURE — 73200 CT UPPER EXTREMITY W/O DYE: CPT

## 2022-01-30 PROCEDURE — 96376 TX/PRO/DX INJ SAME DRUG ADON: CPT

## 2022-01-30 NOTE — CT
EXAMINATION TYPE: CT shoulder RT wo con

 

DATE OF EXAM: 1/30/2022

 

COMPARISON: None

 

HISTORY: right shoulder pain

 

CT DLP: 611.9 mGycm

Automated exposure control for dose reduction was used.

 

Images were obtained from the top of the shoulder to the proximal shaft of the humerus without contra
st.

 

There is right shoulder prosthesis. Components appear to be in anatomic position. Exam limited by met
al artifact. I see no focal bone destruction. There is no sign of any loosening. There is no evidence
 of a soft tissue mass. There is no pathologic fluid collection. There is some spurring on the inferi
or humeral head.

 

IMPRESSION:

Shoulder prosthesis appears intact. No fracture seen.

## 2022-01-30 NOTE — ED
General Adult HPI





- General


Chief complaint: Extremity Injury, Upper


Stated complaint: N/V,Chest Pain,Rt Shoulder Pain


Time Seen by Provider: 22 18:12


Source: patient


Mode of arrival: ambulatory


Limitations: no limitations





- History of Present Illness


Initial comments: 


41 year-old male patient presents to the emergency department for evaluation of 

right shoulder pain and vomiting. States that he started vomiting on Monday. 

States he has had very little to eat and drink since onset. Reports left upper 

quadrant pain. Did have hernia repair to the area in 2021. States today 

his neighbor was helping him up from the floor by pulling on his arm when his 

shoulder dislocated. States that it has happened over 20 times in his life. 

States he is usually sedated to put in back in place. He denies any fever or 

chills. Denies diarrhea. States he is passing gas.  Patient denies any recent 

rash, cough, shortness of breath, chest pain, back pain, numbness, tingling, 

dizziness, weakness, hematuria, dysuria, urinary urgency, urinary frequency, h

eadache, visual changes, or any other complaints.








- Related Data


                                Home Medications











 Medication  Instructions  Recorded  Confirmed


 


Cyclobenzaprine [Flexeril] 5 mg PO TID PRN 22


 


traZODone HCL [Desyrel] 100 mg PO HS 22











                                    Allergies











Allergy/AdvReac Type Severity Reaction Status Date / Time


 


hydromorphone [From Dilaudid] Allergy  Rash/Hives Verified 22 19:01


 


latex Allergy  Rash/Hives Verified 22 19:01


 


meperidine [From Demerol] Allergy  Rash/Hives Verified 22 19:01














Review of Systems


ROS Statement: 


Those systems with pertinent positive or pertinent negative responses have been 

documented in the HPI.





ROS Other: All systems not noted in ROS Statement are negative.





Past Medical History


Past Medical History: GERD/Reflux


Additional Past Medical History / Comment(s): multiple dislocation of rt 

shoulder, hiatal hernia, hx migraines, IBS


History of Any Multi-Drug Resistant Organisms: None Reported


Past Surgical History: Appendectomy, Back Surgery, Cholecystectomy, Orthopedic 

Surgery, Tonsillectomy


Additional Past Surgical History / Comment(s): rt shoulder arthroscopy, left 

shoulder fusion,


Past Anesthesia/Blood Transfusion Reactions: No Reported Reaction


Past Psychological History: No Psychological Hx Reported


Smoking Status: Never smoker


Past Alcohol Use History: None Reported


Past Drug Use History: None Reported





- Past Family History


  ** Mother


Additional Family Medical History / Comment(s): lupus, 





  ** Father


History Unknown: Yes





  ** Brother(s)


Additional Family Medical History / Comment(s): autoimmune disorder





General Exam


Limitations: no limitations


General appearance: alert, in no apparent distress, other (This is a well-

developed, well-nourished adult male in no acute distress.)


ENT exam: Present: normal exam, normal oropharynx, mucous membranes moist


Respiratory exam: Present: normal lung sounds bilaterally.  Absent: respiratory 

distress, wheezes, rales, rhonchi, stridor


Cardiovascular Exam: Present: normal rhythm, tachycardia, normal heart sounds.  

Absent: systolic murmur, diastolic murmur, rubs, gallop, clicks


GI/Abdominal exam: Present: soft, tenderness (Left upper quadrant), normal bowel

sounds.  Absent: distended, guarding, rebound, rigid


Extremities exam: Present: normal inspection, full ROM, tenderness (Right 

shoulder), normal capillary refill, other (Skin to the right arm is pink, warm, 

dry.  Cap refill less than 3 seconds.  Radial pulse 2+.).  Absent: pedal edema, 

joint swelling, calf tenderness


Neurological exam: Present: alert, oriented X3, CN II-XII intact


Psychiatric exam: Present: normal affect, normal mood


Skin exam: Present: warm, dry, intact, normal color.  Absent: rash





Course


                                   Vital Signs











  22





  18:01 21:15


 


Temperature 98.4 F 


 


Pulse Rate 112 H 74


 


Respiratory 18 18





Rate  


 


Blood Pressure 137/79 124/87


 


O2 Sat by Pulse 97 98





Oximetry  














Medical Decision Making





- Medical Decision Making


41 year-old male patient presents to the emergency department for evaluation of 

vomiting, left upper quadrant abdominal pain, and right shoulder pain. Had 

shoulder injury prior to coming in. Physical examination did reveal left upper 

quadrant tenderness. Right shoulder appears intact, no deformity, neurovascular 

status intact. Pt has history of recurrent right shoulder posterior 

dislocations. XRay shoulder was negative. Patient still unable to move the 

shoulder. Right shoulder CT negative, no sign of dislocation. Patient given 

sling. Labs did reveal elevated Lactic Acid at 3.4. CT abdomen and pelvis showed

large hiatal hernia. I spoke with Dr. Starkey from [place], who agreed with 

transfer. Patient is also agreeable. He had Nissen fundoplication with Dr. Reed at [facility] on 10/21/21. He will be transfer back to that facility.

My attending Dr. Vick.








- Lab Data


Result diagrams: 


                                 22 18:37





                                 22 18:37


                                   Lab Results











  22 Range/Units





  18:37 18:37 18:37 


 


WBC  5.8    (3.8-10.6)  k/uL


 


RBC  4.95    (4.30-5.90)  m/uL


 


Hgb  13.2    (13.0-17.5)  gm/dL


 


Hct  42.3    (39.0-53.0)  %


 


MCV  85.5    (80.0-100.0)  fL


 


MCH  26.7    (25.0-35.0)  pg


 


MCHC  31.2    (31.0-37.0)  g/dL


 


RDW  14.5    (11.5-15.5)  %


 


Plt Count  295    (150-450)  k/uL


 


MPV  7.5    


 


Neutrophils %  55    %


 


Lymphocytes %  34    %


 


Monocytes %  6    %


 


Eosinophils %  2    %


 


Basophils %  1    %


 


Neutrophils #  3.2    (1.3-7.7)  k/uL


 


Lymphocytes #  2.0    (1.0-4.8)  k/uL


 


Monocytes #  0.3    (0-1.0)  k/uL


 


Eosinophils #  0.1    (0-0.7)  k/uL


 


Basophils #  0.0    (0-0.2)  k/uL


 


Hypochromasia  Moderate    


 


Sodium   140   (137-145)  mmol/L


 


Potassium   4.1   (3.5-5.1)  mmol/L


 


Chloride   109 H   ()  mmol/L


 


Carbon Dioxide   23   (22-30)  mmol/L


 


Anion Gap   8   mmol/L


 


BUN   15   (9-20)  mg/dL


 


Creatinine   0.87   (0.66-1.25)  mg/dL


 


Est GFR (CKD-EPI)AfAm   >90   (>60 ml/min/1.73 sqM)  


 


Est GFR (CKD-EPI)NonAf   >90   (>60 ml/min/1.73 sqM)  


 


Glucose   170 H   (74-99)  mg/dL


 


Lactic Ac Sepsis Rflx     


 


Plasma Lactic Acid Frankie    3.7 H*  (0.7-2.0)  mmol/L


 


Calcium   9.0   (8.4-10.2)  mg/dL


 


Total Bilirubin   0.3   (0.2-1.3)  mg/dL


 


AST   24   (17-59)  U/L


 


ALT   22   (4-49)  U/L


 


Alkaline Phosphatase   80   ()  U/L


 


Total Protein   7.6   (6.3-8.2)  g/dL


 


Albumin   4.2   (3.5-5.0)  g/dL


 


Lipase   188   ()  U/L


 


Coronavirus (PCR)     (Not Detectd)  














  22 Range/Units





  18:37 19:02 22:05 


 


WBC     (3.8-10.6)  k/uL


 


RBC     (4.30-5.90)  m/uL


 


Hgb     (13.0-17.5)  gm/dL


 


Hct     (39.0-53.0)  %


 


MCV     (80.0-100.0)  fL


 


MCH     (25.0-35.0)  pg


 


MCHC     (31.0-37.0)  g/dL


 


RDW     (11.5-15.5)  %


 


Plt Count     (150-450)  k/uL


 


MPV     


 


Neutrophils %     %


 


Lymphocytes %     %


 


Monocytes %     %


 


Eosinophils %     %


 


Basophils %     %


 


Neutrophils #     (1.3-7.7)  k/uL


 


Lymphocytes #     (1.0-4.8)  k/uL


 


Monocytes #     (0-1.0)  k/uL


 


Eosinophils #     (0-0.7)  k/uL


 


Basophils #     (0-0.2)  k/uL


 


Hypochromasia     


 


Sodium     (137-145)  mmol/L


 


Potassium     (3.5-5.1)  mmol/L


 


Chloride     ()  mmol/L


 


Carbon Dioxide     (22-30)  mmol/L


 


Anion Gap     mmol/L


 


BUN     (9-20)  mg/dL


 


Creatinine     (0.66-1.25)  mg/dL


 


Est GFR (CKD-EPI)AfAm     (>60 ml/min/1.73 sqM)  


 


Est GFR (CKD-EPI)NonAf     (>60 ml/min/1.73 sqM)  


 


Glucose     (74-99)  mg/dL


 


Lactic Ac Sepsis Rflx   Y   


 


Plasma Lactic Acid Frankie    2.0  (0.7-2.0)  mmol/L


 


Calcium     (8.4-10.2)  mg/dL


 


Total Bilirubin     (0.2-1.3)  mg/dL


 


AST     (17-59)  U/L


 


ALT     (4-49)  U/L


 


Alkaline Phosphatase     ()  U/L


 


Total Protein     (6.3-8.2)  g/dL


 


Albumin     (3.5-5.0)  g/dL


 


Lipase     ()  U/L


 


Coronavirus (PCR)  Not Detected    (Not Detectd)  














- Radiology Data


Radiology results: report reviewed, image reviewed


3 views of the right shoulder obtained.  Report was reviewed in its entirety.  

Impression by Dr. Ramon shows no acute abnormality of the right shoulder.  

No change.





CT abdomen and pelvis with contrast was obtained.  Report was reviewed in its 

entirety.  Impression by Dr. Ramon shows large hiatal hernia that appears 

new compared to old exam of 2021.  Otherwise negative computed tomography 

scan of the abdomen and pelvis.





CT right shoulder without contrast was obtained.  Report was reviewed in its e

ntirety.  Impression by Dr. Ramon shows cerebral prosthesis appears intact. 

No fracture seen.  Specifically components appear to be in anatomic position.





Disposition


Clinical Impression: 


 Large hiatal hernia, Intractable abdominal pain, Vomiting





Disposition: OTHER INSTITUTION NOT DEFINED


Condition: Serious


Referrals: 


Tyra Marvin MD [Primary Care Provider] - 1-2 days





- Out of Hospital Transfer - Req. Specs


Out of Hospital Transfer - Requested Specifics: Other Emergency Center ([facility])

## 2022-01-30 NOTE — CT
EXAMINATION TYPE: CT abdomen pelvis w con

 

DATE OF EXAM: 1/30/2022

 

COMPARISON: 9/13/2021

 

HISTORY: abdominal pain, nausea, vomiting

 

CT DLP: 2186.4 mGycm

Automated exposure control for dose reduction was used.

 

CONTRAST: 

Performed with IV Contrast, patient injected with 100 mL of Isovue 300.

 

Images obtained from the diaphragm to the floor of the pelvis with IV contrast.

 

There is some mild atelectasis left posterior lung base. Heart is enlarged. There is large hiatal her
lashay. Heart size is normal. There is no pericardial effusion.

 

Liver and spleen are intact. The bile ducts are not dilated. Gallbladder appears absent. There is no 
evidence of pancreatic mass.

 

There is no adrenal mass. Kidneys show satisfactory contrast opacification. There is no hydronephrosi
s. Ureters are not dilated. Bladder distends smoothly. There is no inguinal hernia. There is no free 
fluid in the pelvis. There is no evidence of a pelvic mass.

 

There is no mesenteric edema. There is no ascites or free air. There is no evidence of a bowel obstru
ction. Appendix not seen. No sign of thickened appendix.

 

The lumbar vertebrae have normal alignment. There is no compression fracture. There is posterior fusi
on surgery at L5-S1. The bony pelvis is intact. The sacroiliac joints are intact.

 

IMPRESSION:

There is large hiatal hernia that appears new compared to old exam. Otherwise negative CT scan of the
 abdomen and pelvis.

## 2022-02-26 ENCOUNTER — HOSPITAL ENCOUNTER (EMERGENCY)
Dept: HOSPITAL 47 - EC | Age: 42
Discharge: OTHER INSTITUTION | End: 2022-02-26
Payer: COMMERCIAL

## 2022-02-26 VITALS — HEART RATE: 96 BPM | RESPIRATION RATE: 16 BRPM | SYSTOLIC BLOOD PRESSURE: 134 MMHG | DIASTOLIC BLOOD PRESSURE: 96 MMHG

## 2022-02-26 VITALS — TEMPERATURE: 98.7 F

## 2022-02-26 DIAGNOSIS — G43.909: ICD-10-CM

## 2022-02-26 DIAGNOSIS — G89.18: Primary | ICD-10-CM

## 2022-02-26 LAB
ALBUMIN SERPL-MCNC: 4.3 G/DL (ref 3.5–5)
ALP SERPL-CCNC: 201 U/L (ref 38–126)
ALT SERPL-CCNC: 21 U/L (ref 4–49)
ANION GAP SERPL CALC-SCNC: 13 MMOL/L
AST SERPL-CCNC: 29 U/L (ref 17–59)
BASOPHILS # BLD AUTO: 0 K/UL (ref 0–0.2)
BASOPHILS NFR BLD AUTO: 0 %
BUN SERPL-SCNC: 17 MG/DL (ref 9–20)
CALCIUM SPEC-MCNC: 9.8 MG/DL (ref 8.4–10.2)
CHLORIDE SERPL-SCNC: 106 MMOL/L (ref 98–107)
CO2 SERPL-SCNC: 20 MMOL/L (ref 22–30)
EOSINOPHIL # BLD AUTO: 0 K/UL (ref 0–0.7)
EOSINOPHIL NFR BLD AUTO: 1 %
ERYTHROCYTE [DISTWIDTH] IN BLOOD BY AUTOMATED COUNT: 4.4 M/UL (ref 4.3–5.9)
ERYTHROCYTE [DISTWIDTH] IN BLOOD: 15.3 % (ref 11.5–15.5)
GLUCOSE SERPL-MCNC: 88 MG/DL (ref 74–99)
HCT VFR BLD AUTO: 34.7 % (ref 39–53)
HGB BLD-MCNC: 10.6 GM/DL (ref 13–17.5)
LIPASE SERPL-CCNC: 368 U/L (ref 23–300)
LYMPHOCYTES # SPEC AUTO: 1.4 K/UL (ref 1–4.8)
LYMPHOCYTES NFR SPEC AUTO: 20 %
MAGNESIUM SPEC-SCNC: 2 MG/DL (ref 1.6–2.3)
MCH RBC QN AUTO: 24.1 PG (ref 25–35)
MCHC RBC AUTO-ENTMCNC: 30.5 G/DL (ref 31–37)
MCV RBC AUTO: 79 FL (ref 80–100)
MONOCYTES # BLD AUTO: 0.5 K/UL (ref 0–1)
MONOCYTES NFR BLD AUTO: 7 %
NEUTROPHILS # BLD AUTO: 5 K/UL (ref 1.3–7.7)
NEUTROPHILS NFR BLD AUTO: 70 %
PLATELET # BLD AUTO: 895 K/UL (ref 150–450)
POTASSIUM SERPL-SCNC: 5 MMOL/L (ref 3.5–5.1)
PROT SERPL-MCNC: 8.6 G/DL (ref 6.3–8.2)
SODIUM SERPL-SCNC: 139 MMOL/L (ref 137–145)
WBC # BLD AUTO: 7.1 K/UL (ref 3.8–10.6)

## 2022-02-26 PROCEDURE — 93005 ELECTROCARDIOGRAM TRACING: CPT

## 2022-02-26 PROCEDURE — 96375 TX/PRO/DX INJ NEW DRUG ADDON: CPT

## 2022-02-26 PROCEDURE — 74177 CT ABD & PELVIS W/CONTRAST: CPT

## 2022-02-26 PROCEDURE — 87040 BLOOD CULTURE FOR BACTERIA: CPT

## 2022-02-26 PROCEDURE — 74018 RADEX ABDOMEN 1 VIEW: CPT

## 2022-02-26 PROCEDURE — 96376 TX/PRO/DX INJ SAME DRUG ADON: CPT

## 2022-02-26 PROCEDURE — 83605 ASSAY OF LACTIC ACID: CPT

## 2022-02-26 PROCEDURE — 80053 COMPREHEN METABOLIC PANEL: CPT

## 2022-02-26 PROCEDURE — 36415 COLL VENOUS BLD VENIPUNCTURE: CPT

## 2022-02-26 PROCEDURE — 85025 COMPLETE CBC W/AUTO DIFF WBC: CPT

## 2022-02-26 PROCEDURE — 96365 THER/PROPH/DIAG IV INF INIT: CPT

## 2022-02-26 PROCEDURE — 83690 ASSAY OF LIPASE: CPT

## 2022-02-26 PROCEDURE — 83735 ASSAY OF MAGNESIUM: CPT

## 2022-02-26 PROCEDURE — 99285 EMERGENCY DEPT VISIT HI MDM: CPT

## 2022-02-26 RX ADMIN — ONDANSETRON STA MG: 2 INJECTION INTRAMUSCULAR; INTRAVENOUS at 12:22

## 2022-02-26 RX ADMIN — ACETAMINOPHEN STA ML: 160 SOLUTION ORAL at 11:14

## 2022-02-26 RX ADMIN — MORPHINE SULFATE STA MG: 4 INJECTION, SOLUTION INTRAMUSCULAR; INTRAVENOUS at 11:14

## 2022-02-26 RX ADMIN — PANTOPRAZOLE SODIUM STA MG: 40 INJECTION, POWDER, FOR SOLUTION INTRAVENOUS at 11:15

## 2022-02-26 RX ADMIN — HYDROMORPHONE HYDROCHLORIDE STA MG: 1 INJECTION, SOLUTION INTRAMUSCULAR; INTRAVENOUS; SUBCUTANEOUS at 14:07

## 2022-02-26 RX ADMIN — ONDANSETRON STA MG: 2 INJECTION INTRAMUSCULAR; INTRAVENOUS at 15:50

## 2022-02-26 RX ADMIN — PIPERACILLIN AND TAZOBACTAM STA MLS/HR: 3; .375 INJECTION, POWDER, FOR SOLUTION INTRAVENOUS at 14:50

## 2022-02-26 RX ADMIN — MORPHINE SULFATE PRN MG: 4 INJECTION, SOLUTION INTRAMUSCULAR; INTRAVENOUS at 12:21

## 2022-02-26 RX ADMIN — MORPHINE SULFATE STA MG: 2 INJECTION, SOLUTION INTRAMUSCULAR; INTRAVENOUS at 15:49

## 2022-02-26 NOTE — XR
Abdomen

 

HISTORY: Pain

 

Frontal view the abdomen on 2 images correlated to CT 1/30/2022, KUB 10/7/2021

 

Postop changes are noted the lumbosacral junction status post posterior fusion. No evident bowel obst
ruction or pneumoperitoneum, nonspecific gas-filled loops of bowel are present. Lung bases not entire
ly included on exam. No evident pathologic calcification. Rib fracture is noted, question prior surge
ry correlate.

 

IMPRESSION: Nonspecific bowel gas pattern, follow-up as indicated on additional findings above

## 2022-02-26 NOTE — ED
General Adult HPI





- General


Chief complaint: Abdominal Pain


Stated complaint: Abdominal Pain


Time Seen by Provider: 22 10:37


Source: patient


Limitations: no limitations





- History of Present Illness


Initial comments: 


Dictation was produced using dragon dictation software. please excuse any 

grammatical, word or spelling errors. 











Chief Complaint: 41-year-old male presents emergency department for abdominal 

pain and dysphagia





History of Present Illness: Patient is a 41-year-old male he has past medical 

history of appendectomy back surgery and orthopedic surgery.  Patient had hiatal

hernia that was treated operatively.  States that beginning of this month he had

a procedure done by the [facility].  He states that he had a thoracic surgery 

perform a procedure were his stomach was cut out in order to remove a persistent

hiatal hernia.  At the same time he had a feeding tube placed.  Patient states 

she's been doing fine over the last couple days however this morning he woke up 

with severe left upper quadrant abdominal pain.  Pain is nonradiating.  States 

it severe.  Patient was seen here in emergency department on  

following left upper quadrant abdominal pain.  Patient has any constitutional 

symptoms.  States that the pain really affected and this morning prompting him 

to come to the emergency department via EMS.  This morning he tried to drink 

some ensure however he is reports that he is terrified to consume any oral 

intake.





The ROS documented in this emergency department record has been reviewed and 

confirmed by me.  Those systems with pertinent positive or negative responses 

have been documented in the HPI.  All other systems are other negative and/or 

noncontributory.








PHYSICAL EXAM:


General Impression: Alert and oriented x3, crying, acute distress secondary to 

pain


HEENT: Normocephalic atraumatic, extra-ocular movements intact, pupils equal and

reactive to light bilaterally, mucous membranes moist.


Cardiovascular: Heart regular rate and rhythm


Chest: Able to complete full sentences, no retractions, no tachypnea


Abdomen: abdomen soft, surgical site is clean dry and intact, palpatory 

tenderness to the left lower quadrant, non-distended, no organomegaly


Musculoskeletal: Pulses present and equal in all extremities, no peripheral 

edema


Motor:  no focal deficits noted


Neurological: CN II-XII grossly intact, no focal motor or sensory deficits noted


Skin: Intact with no visualized rashes


Psych: Normal affect and mood





ED course: 41-year-old male with recent abdominal surgery presents to the ER for

left upper quadrant abdominal pain.  Was evaluated for the same issue on .  He states that earlier this month he had a abdominal surgery to fix a 

hiatal hernia.  Signs upon arrival shows heart rate of 112, respiratory rate 20 

cumbersome vital signs within acceptable limits.  Patient is tearful at the be

dside saying that he is crying because it hurts too much.








EKG interpretation: Ventricular rate 101, sinus tachycardia,.  167, QRS 77, QTC 

369. No VA prolongation, no QTC prolongation, no ST or T-wave changes noted.  

EKG compared to 10/17/2021 showing no changes.  Overall, this EKG is 

unremarkable





Laboratory evaluation obtained.  CBC within acceptable limits.  Does have some 

thrombocytosis and microcytic anemia.  Metabolic panel shows initial lactic 

acidosis 3.4.  Rest metabolic panel is negative.  Abdominal x-rays unremarkable.

 Patient given some analgesia however states that his pain is severe.  CT of the

abdomen and pelvis was obtained showing postoperative changes but there does 

appear to be a fluid collection which may present within the remnant of the 

stomach.  There are inflammatory changes possible infection.  Patient is given a

dose of Zosyn.  Patient will be transferred to [facility] with the 

procedure was performed by Dr. Reed.  Spoke with Dr. Heaton who is willing to 

accept patients care for urinary ER transfer.  Patient agreeable with plan.  He 

is reevaluated bedside at 2:45 PM final be stable medical condition.  His pain 

is improved with Dilaudid.








- Related Data


                                Home Medications











 Medication  Instructions  Recorded  Confirmed


 


Cyclobenzaprine [Flexeril] 5 mg PO BID PRN 22


 


traZODone HCL [Desyrel] 100 mg PO HS 22


 


Acetaminophen Tab [Tylenol Tab] 1,000 mg PO Q6HR 22


 


Gabapentin 300 mg PO BID 22


 


oxyCODONE HCL [OxyIR] 5 mg PO TID PRN 22











                                    Allergies











Allergy/AdvReac Type Severity Reaction Status Date / Time


 


hydromorphone [From Dilaudid] Allergy  Rash/Hives Verified 22 11:09


 


latex Allergy  Rash/Hives Verified 22 11:09


 


meperidine [From Demerol] Allergy  Rash/Hives Verified 22 11:09














Review of Systems


ROS Statement: 


Those systems with pertinent positive or pertinent negative responses have been 

documented in the HPI.





ROS Other: All systems not noted in ROS Statement are negative.





Past Medical History


Past Medical History: GERD/Reflux


Additional Past Medical History / Comment(s): multiple dislocation of rt 

shoulder, hiatal hernia, hx migraines, IBS


History of Any Multi-Drug Resistant Organisms: None Reported


Past Surgical History: Appendectomy, Back Surgery, Cholecystectomy, Orthopedic 

Surgery, Tonsillectomy


Additional Past Surgical History / Comment(s): rt shoulder arthroscopy, left 

shoulder fusion,


Past Anesthesia/Blood Transfusion Reactions: No Reported Reaction


Past Psychological History: No Psychological Hx Reported


Smoking Status: Never smoker


Past Alcohol Use History: None Reported


Past Drug Use History: None Reported





- Past Family History


  ** Mother


Additional Family Medical History / Comment(s): lupus, 





  ** Father


History Unknown: Yes





  ** Brother(s)


Additional Family Medical History / Comment(s): autoimmune disorder





General Exam


Limitations: no limitations





Course


                                   Vital Signs











  22





  10:35 11:39 12:00


 


Temperature 98.7 F  


 


Pulse Rate 112 H 105 H 100


 


Respiratory 28 H 18 18





Rate   


 


Blood Pressure 135/83 102/71 117/77


 


O2 Sat by Pulse 96 94 L 97





Oximetry   














  22





  14:00


 


Temperature 


 


Pulse Rate 78


 


Respiratory 18





Rate 


 


Blood Pressure 139/88


 


O2 Sat by Pulse 97





Oximetry 














Medical Decision Making





- Lab Data


Result diagrams: 


                                 22 11:08





                                 22 11:08


                                   Lab Results











  22 Range/Units





  11:08 11:08 11:08 


 


WBC  7.1    (3.8-10.6)  k/uL


 


RBC  4.40    (4.30-5.90)  m/uL


 


Hgb  10.6 L    (13.0-17.5)  gm/dL


 


Hct  34.7 L    (39.0-53.0)  %


 


MCV  79.0 L D    (80.0-100.0)  fL


 


MCH  24.1 L    (25.0-35.0)  pg


 


MCHC  30.5 L    (31.0-37.0)  g/dL


 


RDW  15.3    (11.5-15.5)  %


 


Plt Count  895 H D    (150-450)  k/uL


 


MPV  7.0    


 


Neutrophils %  70    %


 


Lymphocytes %  20    %


 


Monocytes %  7    %


 


Eosinophils %  1    %


 


Basophils %  0    %


 


Neutrophils #  5.0    (1.3-7.7)  k/uL


 


Lymphocytes #  1.4    (1.0-4.8)  k/uL


 


Monocytes #  0.5    (0-1.0)  k/uL


 


Eosinophils #  0.0    (0-0.7)  k/uL


 


Basophils #  0.0    (0-0.2)  k/uL


 


Hypochromasia  Marked    


 


Poikilocytosis  Moderate    


 


Sodium   139   (137-145)  mmol/L


 


Potassium   5.0   (3.5-5.1)  mmol/L


 


Chloride   106   ()  mmol/L


 


Carbon Dioxide   20 L   (22-30)  mmol/L


 


Anion Gap   13   mmol/L


 


BUN   17   (9-20)  mg/dL


 


Creatinine   1.05   (0.66-1.25)  mg/dL


 


Est GFR (CKD-EPI)AfAm   >90   (>60 ml/min/1.73 sqM)  


 


Est GFR (CKD-EPI)NonAf   88   (>60 ml/min/1.73 sqM)  


 


Glucose   88   (74-99)  mg/dL


 


Lactic Ac Sepsis Rflx     


 


Plasma Lactic Acid Frankie    3.4 H*  (0.7-2.0)  mmol/L


 


Calcium   9.8   (8.4-10.2)  mg/dL


 


Magnesium   2.0   (1.6-2.3)  mg/dL


 


Total Bilirubin   0.6   (0.2-1.3)  mg/dL


 


AST   29   (17-59)  U/L


 


ALT   21   (4-49)  U/L


 


Alkaline Phosphatase   201 H   ()  U/L


 


Total Protein   8.6 H   (6.3-8.2)  g/dL


 


Albumin   4.3   (3.5-5.0)  g/dL


 


Lipase   368 H   ()  U/L














  22 Range/Units





  11:59 14:15 


 


WBC    (3.8-10.6)  k/uL


 


RBC    (4.30-5.90)  m/uL


 


Hgb    (13.0-17.5)  gm/dL


 


Hct    (39.0-53.0)  %


 


MCV    (80.0-100.0)  fL


 


MCH    (25.0-35.0)  pg


 


MCHC    (31.0-37.0)  g/dL


 


RDW    (11.5-15.5)  %


 


Plt Count    (150-450)  k/uL


 


MPV    


 


Neutrophils %    %


 


Lymphocytes %    %


 


Monocytes %    %


 


Eosinophils %    %


 


Basophils %    %


 


Neutrophils #    (1.3-7.7)  k/uL


 


Lymphocytes #    (1.0-4.8)  k/uL


 


Monocytes #    (0-1.0)  k/uL


 


Eosinophils #    (0-0.7)  k/uL


 


Basophils #    (0-0.2)  k/uL


 


Hypochromasia    


 


Poikilocytosis    


 


Sodium    (137-145)  mmol/L


 


Potassium    (3.5-5.1)  mmol/L


 


Chloride    ()  mmol/L


 


Carbon Dioxide    (22-30)  mmol/L


 


Anion Gap    mmol/L


 


BUN    (9-20)  mg/dL


 


Creatinine    (0.66-1.25)  mg/dL


 


Est GFR (CKD-EPI)AfAm    (>60 ml/min/1.73 sqM)  


 


Est GFR (CKD-EPI)NonAf    (>60 ml/min/1.73 sqM)  


 


Glucose    (74-99)  mg/dL


 


Lactic Ac Sepsis Rflx  Y   


 


Plasma Lactic Acid Frankie   1.0  (0.7-2.0)  mmol/L


 


Calcium    (8.4-10.2)  mg/dL


 


Magnesium    (1.6-2.3)  mg/dL


 


Total Bilirubin    (0.2-1.3)  mg/dL


 


AST    (17-59)  U/L


 


ALT    (4-49)  U/L


 


Alkaline Phosphatase    ()  U/L


 


Total Protein    (6.3-8.2)  g/dL


 


Albumin    (3.5-5.0)  g/dL


 


Lipase    ()  U/L














Disposition


Clinical Impression: 


 Post-op pain





Disposition: OTHER INSTITUTION NOT DEFINED


Condition: Fair


Referrals: 


Tyra Marvin MD [Primary Care Provider] - 1-2 days





- Out of Hospital Transfer - Req. Specs


Out of Hospital Transfer - Requested Specifics: Other Emergency Center ([facility]

## 2022-02-26 NOTE — CT
EXAMINATION TYPE: CT abdomen pelvis w con

 

DATE OF EXAM: 2/26/2022

 

COMPARISON: CT 1/30/2022

 

HISTORY: Abdominal pain

 

CT DLP: 1580.6 mGycm

Automated exposure control for dose reduction was used.

 

TECHNIQUE:  Helical acquisition of images from the lung bases through the pelvis have been completed.


 

CONTRAST: 

Performed without Oral Contrast and with IV Contrast, patient injected with 100 ml mL of Isovue 300.

 

FINDINGS: There is a and is believed to be gastric tube in place, some increased attenuation is prese
nt within the subcutaneous fat along the tube and also adjacent to the stomach remnant at the inserti
on, surgical clips are noted, patient shows extensive postoperative changes, there is a fluid collect
ion which may represent the remnant of portion of patient's stomach within probable hiatal hernia, pe
ripheral hyperdense foci likely represent clips. Within the upper abdomen there may been partial adelita
rectomy, gastric sleeve.

 

 LUNG BASES: Pleural air is suspected just lateral to the hiatal hernia, air density is also present 
along the ribs laterally this level, there are rib fractures present likely postsurgical.

 

AORTA:  No significant abnormality is appreciated.

 

LIVER/GB: No significant abnormality is appreciated, gallbladder is not seen and may be surgically ab
sent.

 

PANCREAS: No significant abnormality is seen.

 

SPLEEN: No significant abnormality is seen.

 

ADRENALS: No significant abnormality is seen.

 

KIDNEYS: No significant abnormality is seen.

 

 

REPRODUCTIVE ORGANS: No significant abnormality is seen

 

BOWEL:  Postop changes are present without evident obstruction.

 

FREE AIR:  No Free Air visible.

 

ASCITES:  None visible.

 

PELVIC ADENOPATHY:  None visualized.

 

RETROPERITONEAL ADENOPATHY:  No Retroperitoneal Adenopathy visible.

 

URINARY BLADDER:  No significant abnormality is seen.

 

OSSEOUS STRUCTURES:  Postop changes are noted to the lumbar spine, there is some streak artifact pres
ent rib fracture is noted at the eighth rib, there is been a partial resection posteriorly of the eig
hth rib.

 

IMPRESSION: 

POSTOP CHANGES. RETAINED FLUID IS PRESENT WITHIN WHAT MAY REPRESENT REMNANT OF PATIENT'S STOMACH, COR
RELATE WITH PATIENT'S SURGICAL HISTORY. THERE ARE INFLAMMATORY CHANGES, CORRELATE FOR POSSIBLE INFECT
ION. Postop changes include air density posterior to the left heart and lateral to the hiatal hernia 
which appears to be contained, no other evident pneumothorax, rib fractures as described, results dis
cussed with referring clinician at the time of interpretation

## 2022-03-05 ENCOUNTER — HOSPITAL ENCOUNTER (EMERGENCY)
Dept: HOSPITAL 47 - EC | Age: 42
Discharge: HOME | End: 2022-03-05
Payer: COMMERCIAL

## 2022-03-05 VITALS — RESPIRATION RATE: 18 BRPM | TEMPERATURE: 98 F

## 2022-03-05 VITALS — SYSTOLIC BLOOD PRESSURE: 145 MMHG | HEART RATE: 90 BPM | DIASTOLIC BLOOD PRESSURE: 55 MMHG

## 2022-03-05 DIAGNOSIS — R10.10: ICD-10-CM

## 2022-03-05 DIAGNOSIS — Z79.899: ICD-10-CM

## 2022-03-05 DIAGNOSIS — E86.0: Primary | ICD-10-CM

## 2022-03-05 DIAGNOSIS — K21.9: ICD-10-CM

## 2022-03-05 DIAGNOSIS — R11.2: ICD-10-CM

## 2022-03-05 LAB
ALBUMIN SERPL-MCNC: 3.9 G/DL (ref 3.5–5)
ALP SERPL-CCNC: 111 U/L (ref 38–126)
ALT SERPL-CCNC: 15 U/L (ref 4–49)
ANION GAP SERPL CALC-SCNC: 11 MMOL/L
AST SERPL-CCNC: 20 U/L (ref 17–59)
BASOPHILS # BLD AUTO: 0 K/UL (ref 0–0.2)
BASOPHILS NFR BLD AUTO: 0 %
BUN SERPL-SCNC: 12 MG/DL (ref 9–20)
CALCIUM SPEC-MCNC: 8.9 MG/DL (ref 8.4–10.2)
CHLORIDE SERPL-SCNC: 103 MMOL/L (ref 98–107)
CK SERPL-CCNC: 36 U/L (ref 55–170)
CO2 SERPL-SCNC: 23 MMOL/L (ref 22–30)
EOSINOPHIL # BLD AUTO: 0.2 K/UL (ref 0–0.7)
EOSINOPHIL NFR BLD AUTO: 2 %
ERYTHROCYTE [DISTWIDTH] IN BLOOD BY AUTOMATED COUNT: 3.84 M/UL (ref 4.3–5.9)
ERYTHROCYTE [DISTWIDTH] IN BLOOD: 15.6 % (ref 11.5–15.5)
GLUCOSE SERPL-MCNC: 92 MG/DL (ref 74–99)
HCT VFR BLD AUTO: 29.8 % (ref 39–53)
HGB BLD-MCNC: 9.1 GM/DL (ref 13–17.5)
KETONES UR QL STRIP.AUTO: (no result)
LIPASE SERPL-CCNC: 200 U/L (ref 23–300)
LYMPHOCYTES # SPEC AUTO: 1.4 K/UL (ref 1–4.8)
LYMPHOCYTES NFR SPEC AUTO: 18 %
MAGNESIUM SPEC-SCNC: 2 MG/DL (ref 1.6–2.3)
MCH RBC QN AUTO: 23.6 PG (ref 25–35)
MCHC RBC AUTO-ENTMCNC: 30.5 G/DL (ref 31–37)
MCV RBC AUTO: 77.6 FL (ref 80–100)
MONOCYTES # BLD AUTO: 0.5 K/UL (ref 0–1)
MONOCYTES NFR BLD AUTO: 7 %
NEUTROPHILS # BLD AUTO: 5.2 K/UL (ref 1.3–7.7)
NEUTROPHILS NFR BLD AUTO: 71 %
PH UR: 8.5 [PH] (ref 5–8)
PLATELET # BLD AUTO: 273 K/UL (ref 150–450)
POTASSIUM SERPL-SCNC: 4.4 MMOL/L (ref 3.5–5.1)
PROT SERPL-MCNC: 7.5 G/DL (ref 6.3–8.2)
SODIUM SERPL-SCNC: 137 MMOL/L (ref 137–145)
SP GR UR: >1.05 (ref 1–1.03)
UROBILINOGEN UR QL STRIP: <2 MG/DL (ref ?–2)
WBC # BLD AUTO: 7.4 K/UL (ref 3.8–10.6)

## 2022-03-05 PROCEDURE — 36415 COLL VENOUS BLD VENIPUNCTURE: CPT

## 2022-03-05 PROCEDURE — 74018 RADEX ABDOMEN 1 VIEW: CPT

## 2022-03-05 PROCEDURE — 96376 TX/PRO/DX INJ SAME DRUG ADON: CPT

## 2022-03-05 PROCEDURE — 83735 ASSAY OF MAGNESIUM: CPT

## 2022-03-05 PROCEDURE — 82550 ASSAY OF CK (CPK): CPT

## 2022-03-05 PROCEDURE — 96361 HYDRATE IV INFUSION ADD-ON: CPT

## 2022-03-05 PROCEDURE — 96374 THER/PROPH/DIAG INJ IV PUSH: CPT

## 2022-03-05 PROCEDURE — 74177 CT ABD & PELVIS W/CONTRAST: CPT

## 2022-03-05 PROCEDURE — 93005 ELECTROCARDIOGRAM TRACING: CPT

## 2022-03-05 PROCEDURE — 81003 URINALYSIS AUTO W/O SCOPE: CPT

## 2022-03-05 PROCEDURE — 99284 EMERGENCY DEPT VISIT MOD MDM: CPT

## 2022-03-05 PROCEDURE — 96375 TX/PRO/DX INJ NEW DRUG ADDON: CPT

## 2022-03-05 PROCEDURE — 71046 X-RAY EXAM CHEST 2 VIEWS: CPT

## 2022-03-05 PROCEDURE — 85025 COMPLETE CBC W/AUTO DIFF WBC: CPT

## 2022-03-05 PROCEDURE — 83690 ASSAY OF LIPASE: CPT

## 2022-03-05 PROCEDURE — 80053 COMPREHEN METABOLIC PANEL: CPT

## 2022-03-05 NOTE — XR
EXAMINATION TYPE: XR KUB

 

DATE OF EXAM: 3/5/2022

 

COMPARISON: 2/26/2022

 

HISTORY: Pain

 

TECHNIQUE: 2 views upright

 

FINDINGS: There is no sign of intestinal obstruction or pneumoperitoneum. Fecal pattern is normal. Th
ere is no evidence of a mass. There is some blunting of the left costophrenic angle.

 

IMPRESSION: Nonacute abdomen. No change.

 pleural reaction left lung base.

## 2022-03-05 NOTE — XR
EXAMINATION TYPE: XR chest 2V

 

DATE OF EXAM: 3/5/2022

 

COMPARISON: 10/17/2021

 

HISTORY: Chest pain

 

TECHNIQUE: 2 views

 

FINDINGS: There is some infiltrate and atelectasis left lower lobe and slight blunting left costophre
mag angle. Right lung is clear. No heart failure. Heart size is normal. There is right shoulder prost
hesis.

 

IMPRESSION: There is some infiltrate and atelectasis and pleural reaction left lower lobe which is mo
stly new compared to old exam.

## 2022-03-05 NOTE — ED
Nausea/Vomiting/Diarrhea HPI





- General


Chief complaint: Nausea/Vomiting/Diarrhea


Stated complaint: abd /chest pain, vomiting, recent surgery


Time Seen by Provider: 22 15:27


Source: patient, family, RN notes reviewed, old records reviewed


Mode of arrival: wheelchair


Limitations: no limitations





- History of Present Illness


Initial comments: 





41-year-old male with a history of gastric bypass surgery early February of this

year who had previously been in the hospital until 2 days ago who is back today 

with complaints of persistent nausea vomiting upper abdominal pain is sharp in 

nature he is having bowel movements and does state his urine is somewhat more 

concentrated than usual no overt fevers chills or sweats.  He states he still 

has a feeding tube in place he is able take some nourishment by mouth however.  

Pain is moderate to severe.  No other current complaints she states are similar 

with previous admissions here


MD complaint: nausea, vomiting, abdominal pain





- Related Data


                                Home Medications











 Medication  Instructions  Recorded  Confirmed


 


traZODone HCL [Desyrel] 100 mg PO HS 22


 


Acetaminophen Tab [Tylenol Tab] 1,000 mg PO Q6H 22


 


Gabapentin 300 mg PO BID 22


 


oxyCODONE HCL [OxyIR] 10 mg PO Q4H 22


 


Famotidine [Pepcid] 20 mg PO Q12H 22


 


Ibuprofen [Motrin] 600 mg PO Q8H PRN 22


 


methocarbamoL [Robaxin] 500 mg PO Q6H PRN 22








                                  Previous Rx's











 Medication  Instructions  Recorded


 


Prochlorperazine [Compazine] 10 mg PO Q6H #10 tab 22











                                    Allergies











Allergy/AdvReac Type Severity Reaction Status Date / Time


 


hydromorphone [From Dilaudid] Allergy  Rash/Hives Verified 22 16:55


 


latex Allergy  Rash/Hives Verified 22 16:55


 


meperidine [From Demerol] Allergy  Rash/Hives Verified 22 16:55














Review of Systems


ROS Statement: 


Those systems with pertinent positive or pertinent negative responses have been 

documented in the HPI.





ROS Other: All systems not noted in ROS Statement are negative.





Past Medical History


Past Medical History: GERD/Reflux


Additional Past Medical History / Comment(s): multiple dislocation of rt 

shoulder, hiatal hernia, hx migraines, IBS


History of Any Multi-Drug Resistant Organisms: None Reported


Past Surgical History: Appendectomy, Back Surgery, Cholecystectomy, Orthopedic 

Surgery, Tonsillectomy


Additional Past Surgical History / Comment(s): rt shoulder arthroscopy, left 

shoulder fusion,


Past Anesthesia/Blood Transfusion Reactions: No Reported Reaction


Past Psychological History: No Psychological Hx Reported


Smoking Status: Never smoker


Past Alcohol Use History: None Reported


Past Drug Use History: None Reported





- Past Family History


  ** Mother


Additional Family Medical History / Comment(s): lupus, 





  ** Father


History Unknown: Yes





  ** Brother(s)


Additional Family Medical History / Comment(s): autoimmune disorder





General Exam





- General Exam Comments


Initial Comments: 





This is a well-developed well-nourished awake alert oriented times 3 male


Limitations: no limitations


General appearance: alert, anxious


Head exam: Present: atraumatic, normocephalic, normal inspection


Eye exam: Present: normal appearance, PERRL, EOMI.  Absent: scleral icterus, 

conjunctival injection, periorbital swelling


ENT exam: Present: mucous membranes dry


Neck exam: Present: normal inspection.  Absent: tenderness, meningismus, 

lymphadenopathy


Respiratory exam: Present: normal lung sounds bilaterally.  Absent: respiratory 

distress, wheezes, rales, rhonchi, stridor


Cardiovascular Exam: Present: regular rate, normal rhythm, normal heart sounds. 

Absent: systolic murmur, diastolic murmur, rubs, gallop, clicks


GI/Abdominal exam: Present: soft, tenderness (Feeding tube in place wound 

demonstrate no evidence of dehiscence some mild tenderness palpation no overt 

guarding rebound), normal bowel sounds.  Absent: distended, guarding, rebound, 

rigid


Rectal exam: Present: deferred


Extremities exam: Present: normal inspection, full ROM, normal capillary refill.

 Absent: tenderness, pedal edema, joint swelling, calf tenderness


Back exam: Present: normal inspection


Neurological exam: Present: alert, oriented X3, CN II-XII intact


Psychiatric exam: Present: normal affect, normal mood


Skin exam: Present: warm, dry, intact, normal color.  Absent: rash





Course


                                   Vital Signs











  22





  15:01 16:42 18:35


 


Temperature 98 F  


 


Pulse Rate 107 H 67 99


 


Respiratory 18 18 18





Rate   


 


Blood Pressure 125/82 109/74 120/83


 


O2 Sat by Pulse 100 98 98





Oximetry   














  22





  19:26


 


Temperature 


 


Pulse Rate 98


 


Respiratory 18





Rate 


 


Blood Pressure 123/75


 


O2 Sat by Pulse 99





Oximetry 














Medical Decision Making





- Medical Decision Making





I did a long discussion with patient family patient was still having intractable

nausea and some abdominal discomfort did discuss the case with Dr. Villanueva.  

Patient was be transferred to [facility] in [facility] he is 

known to be transferred he would said like to go home and try managing things at

home until his appointment on Monday we did discuss return parameters and 

discussed medications he'll be placed on Compazine for nausea


He did reiterate that he has not had any bleeding of any sort.  Does have 

evidence of microcytic anemia.





- Lab Data


Result diagrams: 


                                 22 16:22





                                 22 16:22


                                   Lab Results











  22 Range/Units





  16:22 16:22 19:26 


 


WBC  7.4    (3.8-10.6)  k/uL


 


RBC  3.84 L    (4.30-5.90)  m/uL


 


Hgb  9.1 L D    (13.0-17.5)  gm/dL


 


Hct  29.8 L    (39.0-53.0)  %


 


MCV  77.6 L    (80.0-100.0)  fL


 


MCH  23.6 L    (25.0-35.0)  pg


 


MCHC  30.5 L    (31.0-37.0)  g/dL


 


RDW  15.6 H    (11.5-15.5)  %


 


Plt Count  273    (150-450)  k/uL


 


MPV  9.4    


 


Neutrophils %  71    %


 


Lymphocytes %  18    %


 


Monocytes %  7    %


 


Eosinophils %  2    %


 


Basophils %  0    %


 


Neutrophils #  5.2    (1.3-7.7)  k/uL


 


Lymphocytes #  1.4    (1.0-4.8)  k/uL


 


Monocytes #  0.5    (0-1.0)  k/uL


 


Eosinophils #  0.2    (0-0.7)  k/uL


 


Basophils #  0.0    (0-0.2)  k/uL


 


Hypochromasia  Marked    


 


Poikilocytosis  Moderate    


 


Microcytosis  Slight    


 


Sodium   137   (137-145)  mmol/L


 


Potassium   4.4   (3.5-5.1)  mmol/L


 


Chloride   103   ()  mmol/L


 


Carbon Dioxide   23   (22-30)  mmol/L


 


Anion Gap   11   mmol/L


 


BUN   12   (9-20)  mg/dL


 


Creatinine   0.85   (0.66-1.25)  mg/dL


 


Est GFR (CKD-EPI)AfAm   >90   (>60 ml/min/1.73 sqM)  


 


Est GFR (CKD-EPI)NonAf   >90   (>60 ml/min/1.73 sqM)  


 


Glucose   92   (74-99)  mg/dL


 


Calcium   8.9   (8.4-10.2)  mg/dL


 


Magnesium   2.0   (1.6-2.3)  mg/dL


 


Total Bilirubin   0.4   (0.2-1.3)  mg/dL


 


AST   20   (17-59)  U/L


 


ALT   15   (4-49)  U/L


 


Alkaline Phosphatase   111   ()  U/L


 


Creatine Kinase   36 L   ()  U/L


 


Total Protein   7.5   (6.3-8.2)  g/dL


 


Albumin   3.9   (3.5-5.0)  g/dL


 


Lipase   200   ()  U/L


 


Urine Color    Yellow  


 


Urine Appearance    Clear  (Clear)  


 


Urine pH    8.5 H  (5.0-8.0)  


 


Ur Specific Gravity    >1.050 H  (1.001-1.035)  


 


Urine Protein    Trace H  (Negative)  


 


Urine Glucose (UA)    Negative  (Negative)  


 


Urine Ketones    2+ H  (Negative)  


 


Urine Blood    Negative  (Negative)  


 


Urine Nitrite    Negative  (Negative)  


 


Urine Bilirubin    Negative  (Negative)  


 


Urine Urobilinogen    <2.0  (<2.0)  mg/dL


 


Ur Leukocyte Esterase    Negative  (Negative)  














- EKG Data


-: EKG Interpreted by Me


EKG shows normal: sinus rhythm


EKG Comments: 





EKG shows sinus rhythm at 95.  Interval 175 QRS restoration daily QT since QTC 

304/356 possible right ventricular conduction delay no other findings seen





- Radiology Data


Radiology results: report reviewed (Review reviewed no evidence of acute 

processes patient does have a left lower lobe infiltrate which appears be stable

also atelectasis and a slight amount of residual pneumothorax.  Please see the 

complete report), image reviewed





Disposition


Clinical Impression: 


 Dehydration, Nausea & vomiting, Abdominal pain





Disposition: HOME SELF-CARE


Condition: Stable


Prescriptions: 


Prochlorperazine [Compazine] 10 mg PO Q6H #10 tab


Is patient prescribed a controlled substance at d/c from ED?: No


Referrals: 


Tyra Marvin MD [Primary Care Provider] - 1-2 days

## 2022-03-05 NOTE — CT
EXAMINATION TYPE: CT abdomen pelvis w con

 

DATE OF EXAM: 3/5/2022

 

COMPARISON: 2/26/2022

 

HISTORY: recent gastric bypass sx, vomiting and pain

 

CT DLP: 1780.2 mGycm

Automated exposure control for dose reduction was used.

 

CONTRAST: 

Performed with IV Contrast, patient injected with 100 mL of Isovue 300.

 

 

There is some infiltrate or atelectasis left lung base. There is surgical clips from gastric bariatri
c surgery with large hiatal hernia. Right lung base is clear. Heart size is normal. There is no peric
ardial effusion. There is a 5 x 3 cm small pneumothorax adjacent to the left cardiac border and left 
lung base without much change compared to last exam.

 

Liver and spleen are intact. The bile ducts are not dilated. There is no pancreatic mass. There is ga
strostomy tube noted. There is no adrenal mass. Kidneys show satisfactory contrast opacification. The
re is no hydronephrosis. Ureters are not dilated. There is no retroperitoneal adenopathy. Bladder dis
tends smoothly. There is no inguinal hernia. Delayed images show normal renal excretion. There is no 
pelvic mass. There is no inguinal hernia. There is no free fluid in the pelvis. Small bowel pattern i
s normal. There is no mesenteric edema. There is no ascites or free air. There is some mild fat stran
ding in the anterior midline abdomen consistent with postsurgical changes in the omental fat.

 

The lumbar vertebrae have normal alignment. There is posterior fusion surgery at L5-S1 with subtle ar
tifact. There is narrowing of the L5-S1 disc space. Bony pelvis is intact. The hip joints are intact.
 Sacroiliac joints are intact.

 

IMPRESSION:

Gastrostomy or jejunostomy tube in good position. No evidence of a bowel obstruction. No free air. Mi
ld anterior abdominal postsurgical changes. Abdomen pelvis not significantly different than last exam
. No evidence of abdominal abscess.

 

Stable infiltrate and atelectasis left lung base.

 

Stable small left-sided pneumothorax at the left cardiac border.

## 2022-03-06 ENCOUNTER — HOSPITAL ENCOUNTER (EMERGENCY)
Dept: HOSPITAL 47 - EC | Age: 42
LOS: 1 days | Discharge: OTHER INSTITUTION | End: 2022-03-07
Payer: COMMERCIAL

## 2022-03-06 VITALS — RESPIRATION RATE: 18 BRPM | TEMPERATURE: 99.9 F

## 2022-03-06 VITALS — HEART RATE: 93 BPM | SYSTOLIC BLOOD PRESSURE: 113 MMHG | DIASTOLIC BLOOD PRESSURE: 82 MMHG

## 2022-03-06 DIAGNOSIS — D64.9: ICD-10-CM

## 2022-03-06 DIAGNOSIS — G89.18: ICD-10-CM

## 2022-03-06 DIAGNOSIS — K21.9: ICD-10-CM

## 2022-03-06 DIAGNOSIS — K92.1: Primary | ICD-10-CM

## 2022-03-06 DIAGNOSIS — Z79.899: ICD-10-CM

## 2022-03-06 LAB
ALBUMIN SERPL-MCNC: 4 G/DL (ref 3.5–5)
ALP SERPL-CCNC: 114 U/L (ref 38–126)
ALT SERPL-CCNC: 16 U/L (ref 4–49)
ANION GAP SERPL CALC-SCNC: 11 MMOL/L
AST SERPL-CCNC: 19 U/L (ref 17–59)
BASOPHILS # BLD AUTO: 0 K/UL (ref 0–0.2)
BASOPHILS NFR BLD AUTO: 0 %
BUN SERPL-SCNC: 10 MG/DL (ref 9–20)
CALCIUM SPEC-MCNC: 9 MG/DL (ref 8.4–10.2)
CHLORIDE SERPL-SCNC: 106 MMOL/L (ref 98–107)
CO2 SERPL-SCNC: 21 MMOL/L (ref 22–30)
EOSINOPHIL # BLD AUTO: 0.1 K/UL (ref 0–0.7)
EOSINOPHIL NFR BLD AUTO: 1 %
ERYTHROCYTE [DISTWIDTH] IN BLOOD BY AUTOMATED COUNT: 3.73 M/UL (ref 4.3–5.9)
ERYTHROCYTE [DISTWIDTH] IN BLOOD: 15.6 % (ref 11.5–15.5)
GLUCOSE SERPL-MCNC: 105 MG/DL (ref 74–99)
HCT VFR BLD AUTO: 29.3 % (ref 39–53)
HGB BLD-MCNC: 8.8 GM/DL (ref 13–17.5)
LIPASE SERPL-CCNC: 201 U/L (ref 23–300)
LYMPHOCYTES # SPEC AUTO: 1.4 K/UL (ref 1–4.8)
LYMPHOCYTES NFR SPEC AUTO: 18 %
MCH RBC QN AUTO: 23.7 PG (ref 25–35)
MCHC RBC AUTO-ENTMCNC: 30.2 G/DL (ref 31–37)
MCV RBC AUTO: 78.6 FL (ref 80–100)
MONOCYTES # BLD AUTO: 0.5 K/UL (ref 0–1)
MONOCYTES NFR BLD AUTO: 6 %
NEUTROPHILS # BLD AUTO: 5.6 K/UL (ref 1.3–7.7)
NEUTROPHILS NFR BLD AUTO: 73 %
PLATELET # BLD AUTO: 324 K/UL (ref 150–450)
POTASSIUM SERPL-SCNC: 4 MMOL/L (ref 3.5–5.1)
PROT SERPL-MCNC: 7.3 G/DL (ref 6.3–8.2)
SODIUM SERPL-SCNC: 138 MMOL/L (ref 137–145)
WBC # BLD AUTO: 7.8 K/UL (ref 3.8–10.6)

## 2022-03-06 PROCEDURE — 87635 SARS-COV-2 COVID-19 AMP PRB: CPT

## 2022-03-06 PROCEDURE — 74018 RADEX ABDOMEN 1 VIEW: CPT

## 2022-03-06 PROCEDURE — 96376 TX/PRO/DX INJ SAME DRUG ADON: CPT

## 2022-03-06 PROCEDURE — 96375 TX/PRO/DX INJ NEW DRUG ADDON: CPT

## 2022-03-06 PROCEDURE — 85025 COMPLETE CBC W/AUTO DIFF WBC: CPT

## 2022-03-06 PROCEDURE — 96374 THER/PROPH/DIAG INJ IV PUSH: CPT

## 2022-03-06 PROCEDURE — 80053 COMPREHEN METABOLIC PANEL: CPT

## 2022-03-06 PROCEDURE — 83690 ASSAY OF LIPASE: CPT

## 2022-03-06 PROCEDURE — 36415 COLL VENOUS BLD VENIPUNCTURE: CPT

## 2022-03-06 PROCEDURE — 83605 ASSAY OF LACTIC ACID: CPT

## 2022-03-06 PROCEDURE — 96361 HYDRATE IV INFUSION ADD-ON: CPT

## 2022-03-06 PROCEDURE — 99285 EMERGENCY DEPT VISIT HI MDM: CPT

## 2022-03-06 NOTE — XR
EXAMINATION TYPE: XR abdomen 1V

 

DATE OF EXAM: 3/6/2022

 

COMPARISON: 3/5/2022

 

HISTORY: Abdominal pain nausea and vomiting

 

TECHNIQUE: 2 views supine

 

FINDINGS: 2 view supine were obtained. There is no sign of intestinal obstruction or pneumoperitoneum
. Fecal pattern is normal. There is no evidence of a mass. There is lumbar spine fusion surgery. Ther
e are no calcifications over the kidneys. There is normal-appearing gas in the descending colon.

 

IMPRESSION: Nonacute abdomen. No adverse change.

## 2022-03-26 ENCOUNTER — HOSPITAL ENCOUNTER (EMERGENCY)
Dept: HOSPITAL 47 - EC | Age: 42
Discharge: HOME | End: 2022-03-26
Payer: COMMERCIAL

## 2022-03-26 VITALS — HEART RATE: 80 BPM | SYSTOLIC BLOOD PRESSURE: 171 MMHG | RESPIRATION RATE: 18 BRPM | DIASTOLIC BLOOD PRESSURE: 58 MMHG

## 2022-03-26 VITALS — TEMPERATURE: 97.9 F

## 2022-03-26 DIAGNOSIS — K21.9: ICD-10-CM

## 2022-03-26 DIAGNOSIS — R11.2: Primary | ICD-10-CM

## 2022-03-26 DIAGNOSIS — R10.84: ICD-10-CM

## 2022-03-26 DIAGNOSIS — Z79.899: ICD-10-CM

## 2022-03-26 LAB
ALBUMIN SERPL-MCNC: 4.7 G/DL (ref 3.5–5)
ALP SERPL-CCNC: 97 U/L (ref 38–126)
ALT SERPL-CCNC: 24 U/L (ref 4–49)
ANION GAP SERPL CALC-SCNC: 15 MMOL/L
AST SERPL-CCNC: 34 U/L (ref 17–59)
BASOPHILS # BLD AUTO: 0 K/UL (ref 0–0.2)
BASOPHILS NFR BLD AUTO: 0 %
BUN SERPL-SCNC: 16 MG/DL (ref 9–20)
CALCIUM SPEC-MCNC: 10.1 MG/DL (ref 8.4–10.2)
CHLORIDE SERPL-SCNC: 106 MMOL/L (ref 98–107)
CO2 SERPL-SCNC: 19 MMOL/L (ref 22–30)
EOSINOPHIL # BLD AUTO: 0 K/UL (ref 0–0.7)
EOSINOPHIL NFR BLD AUTO: 0 %
ERYTHROCYTE [DISTWIDTH] IN BLOOD BY AUTOMATED COUNT: 4.76 M/UL (ref 4.3–5.9)
ERYTHROCYTE [DISTWIDTH] IN BLOOD: 20.8 % (ref 11.5–15.5)
GLUCOSE SERPL-MCNC: 144 MG/DL (ref 74–99)
HCT VFR BLD AUTO: 39.1 % (ref 39–53)
HGB BLD-MCNC: 12.1 GM/DL (ref 13–17.5)
LIPASE SERPL-CCNC: 132 U/L (ref 23–300)
LYMPHOCYTES # SPEC AUTO: 0.6 K/UL (ref 1–4.8)
LYMPHOCYTES NFR SPEC AUTO: 8 %
MAGNESIUM SPEC-SCNC: 1.8 MG/DL (ref 1.6–2.3)
MCH RBC QN AUTO: 25.3 PG (ref 25–35)
MCHC RBC AUTO-ENTMCNC: 30.9 G/DL (ref 31–37)
MCV RBC AUTO: 82 FL (ref 80–100)
MONOCYTES # BLD AUTO: 0.3 K/UL (ref 0–1)
MONOCYTES NFR BLD AUTO: 4 %
NEUTROPHILS # BLD AUTO: 6.5 K/UL (ref 1.3–7.7)
NEUTROPHILS NFR BLD AUTO: 87 %
PLATELET # BLD AUTO: 487 K/UL (ref 150–450)
POTASSIUM SERPL-SCNC: 4.6 MMOL/L (ref 3.5–5.1)
PROT SERPL-MCNC: 8.5 G/DL (ref 6.3–8.2)
SODIUM SERPL-SCNC: 140 MMOL/L (ref 137–145)
WBC # BLD AUTO: 7.6 K/UL (ref 3.8–10.6)

## 2022-03-26 PROCEDURE — 85025 COMPLETE CBC W/AUTO DIFF WBC: CPT

## 2022-03-26 PROCEDURE — 83735 ASSAY OF MAGNESIUM: CPT

## 2022-03-26 PROCEDURE — 99284 EMERGENCY DEPT VISIT MOD MDM: CPT

## 2022-03-26 PROCEDURE — 80053 COMPREHEN METABOLIC PANEL: CPT

## 2022-03-26 PROCEDURE — 96374 THER/PROPH/DIAG INJ IV PUSH: CPT

## 2022-03-26 PROCEDURE — 96361 HYDRATE IV INFUSION ADD-ON: CPT

## 2022-03-26 PROCEDURE — 36415 COLL VENOUS BLD VENIPUNCTURE: CPT

## 2022-03-26 PROCEDURE — 83690 ASSAY OF LIPASE: CPT

## 2022-03-26 PROCEDURE — 84100 ASSAY OF PHOSPHORUS: CPT

## 2022-03-26 PROCEDURE — 96375 TX/PRO/DX INJ NEW DRUG ADDON: CPT

## 2022-03-26 NOTE — ED
Nausea/Vomiting/Diarrhea HPI





- General


Chief complaint: Nausea/Vomiting/Diarrhea


Stated complaint: Nausea, vomiting


Time Seen by Provider: 22 00:51


Source: patient, RN notes reviewed, old records reviewed


Mode of arrival: EMS


Limitations: no limitations





- History of Present Illness


Initial comments: 





This is a 41-year-old male to the ER for evaluation.  Patient presents today for

evaluation regards to significant nausea vomiting abdominal pain.  History of 

same.  Patient has history of hiatal hernia surgery otherwise no recent travel 

history or sick contacts.  He has significant nausea vomiting currently no 

fevers.  Patient is recently of long inpatient hospitalization stay


MD complaint: nausea, vomiting, abdominal pain


-: days(s)


Description of Vomiting: food contents, watery


Associated Abdominal Pain: Yes


Location: diffuse


Radiation: none


Quality: cramping, stabbing


Consistency: constant


Improves with: none


Worsens with: eating, vomiting


Context: history of abdominal surgery


Associated Symptoms: loss of appetite, malaise, nausea/vomiting, weakness





- Related Data


                                Home Medications











 Medication  Instructions  Recorded  Confirmed


 


traZODone HCL [Desyrel] 100 mg PO HS 22


 


Acetaminophen Tab [Tylenol Tab] 1,000 mg PO Q6H 22


 


Gabapentin 300 mg PO BID 22


 


oxyCODONE HCL [OxyIR] 10 mg PO Q4H 22


 


Famotidine [Pepcid] 20 mg PO Q12H 22


 


Ibuprofen [Motrin] 600 mg PO Q8H PRN 22


 


methocarbamoL [Robaxin] 500 mg PO Q6H PRN 22








                                  Previous Rx's











 Medication  Instructions  Recorded


 


Prochlorperazine [Compazine] 10 mg PO Q6H #10 tab 22











                                    Allergies











Allergy/AdvReac Type Severity Reaction Status Date / Time


 


hydromorphone [From Dilaudid] Allergy  Rash/Hives Verified 22 21:36


 


latex Allergy  Rash/Hives Verified 22 21:36


 


meperidine [From Demerol] Allergy  Rash/Hives Verified 22 21:36














Review of Systems


ROS Statement: 


Those systems with pertinent positive or pertinent negative responses have been 

documented in the HPI.





ROS Other: All systems not noted in ROS Statement are negative.





Past Medical History


Past Medical History: GERD/Reflux


Additional Past Medical History / Comment(s): multiple dislocation of rt 

shoulder, hiatal hernia, hx migraines, IBS


History of Any Multi-Drug Resistant Organisms: None Reported


Past Surgical History: Appendectomy, Back Surgery, Cholecystectomy, Orthopedic 

Surgery, Tonsillectomy


Additional Past Surgical History / Comment(s): rt shoulder arthroscopy, left 

shoulder fusion,


Past Anesthesia/Blood Transfusion Reactions: No Reported Reaction


Past Psychological History: No Psychological Hx Reported


Smoking Status: Never smoker


Past Alcohol Use History: None Reported


Past Drug Use History: None Reported





- Past Family History


  ** Mother


Additional Family Medical History / Comment(s): lupus, 





  ** Father


History Unknown: Yes





  ** Brother(s)


Additional Family Medical History / Comment(s): autoimmune disorder





General Exam


General appearance: alert, in no apparent distress, anxious


Head exam: Present: atraumatic, normocephalic, normal inspection


Eye exam: Present: normal appearance, PERRL, EOMI.  Absent: scleral icterus, 

conjunctival injection, periorbital swelling


ENT exam: Present: normal exam, mucous membranes dry


Neck exam: Present: normal inspection.  Absent: tenderness, meningismus, 

lymphadenopathy


Respiratory exam: Present: normal lung sounds bilaterally.  Absent: respiratory 

distress, wheezes, rales, rhonchi, stridor


Cardiovascular Exam: Present: normal rhythm, tachycardia, normal heart sounds.  

Absent: systolic murmur, diastolic murmur, rubs, gallop, clicks


GI/Abdominal exam: Present: soft, normal bowel sounds.  Absent: distended, 

tenderness, guarding, rebound, rigid


Extremities exam: Present: normal inspection, full ROM, normal capillary refill.

 Absent: tenderness, pedal edema, joint swelling, calf tenderness


Back exam: Present: normal inspection


Neurological exam: Present: alert, oriented X3, CN II-XII intact


Psychiatric exam: Present: normal affect, normal mood


Skin exam: Present: warm, dry, intact, normal color.  Absent: rash





Course


                                   Vital Signs











  22





  00:27 03:50 05:35


 


Temperature 97.9 F  


 


Pulse Rate 103 H 88 80


 


Respiratory 18 16 18





Rate   


 


Blood Pressure 188/73 159/77 171/58


 


O2 Sat by Pulse 97 96 98





Oximetry   














- Reevaluation(s)


Reevaluation #1: 





22 04:44


medical record is reviewed





Medical Decision Making





- Medical Decision Making





41 male who was seen here in the ER for nausea vomiting abdominal pain.  

Reevaluation patient significant improved.  Does have significant history of 

hiatal hernia with lung hospital admission.  Patient just went to the point 

originally better currently not wanting recurrent admission.





- Lab Data


Result diagrams: 


                                 22 01:57





                                 22 01:57


                                   Lab Results











  22 Range/Units





  01:57 01:57 


 


WBC  7.6   (3.8-10.6)  k/uL


 


RBC  4.76   (4.30-5.90)  m/uL


 


Hgb  12.1 L D   (13.0-17.5)  gm/dL


 


Hct  39.1   (39.0-53.0)  %


 


MCV  82.0   (80.0-100.0)  fL


 


MCH  25.3   (25.0-35.0)  pg


 


MCHC  30.9 L   (31.0-37.0)  g/dL


 


RDW  20.8 H   (11.5-15.5)  %


 


Plt Count  487 H   (150-450)  k/uL


 


MPV  7.7   


 


Neutrophils %  87   %


 


Lymphocytes %  8   %


 


Monocytes %  4   %


 


Eosinophils %  0   %


 


Basophils %  0   %


 


Neutrophils #  6.5   (1.3-7.7)  k/uL


 


Lymphocytes #  0.6 L   (1.0-4.8)  k/uL


 


Monocytes #  0.3   (0-1.0)  k/uL


 


Eosinophils #  0.0   (0-0.7)  k/uL


 


Basophils #  0.0   (0-0.2)  k/uL


 


Hypochromasia  Marked   


 


Anisocytosis  Moderate   


 


Microcytosis  Slight   


 


Sodium   140  (137-145)  mmol/L


 


Potassium   4.6  (3.5-5.1)  mmol/L


 


Chloride   106  ()  mmol/L


 


Carbon Dioxide   19 L  (22-30)  mmol/L


 


Anion Gap   15  mmol/L


 


BUN   16  (9-20)  mg/dL


 


Creatinine   0.96  (0.66-1.25)  mg/dL


 


Est GFR (CKD-EPI)AfAm   >90  (>60 ml/min/1.73 sqM)  


 


Est GFR (CKD-EPI)NonAf   >90  (>60 ml/min/1.73 sqM)  


 


Glucose   144 H  (74-99)  mg/dL


 


Calcium   10.1  (8.4-10.2)  mg/dL


 


Phosphorus   1.5 L  (2.5-4.5)  mg/dL


 


Magnesium   1.8  (1.6-2.3)  mg/dL


 


Total Bilirubin   0.6  (0.2-1.3)  mg/dL


 


AST   34  (17-59)  U/L


 


ALT   24  (4-49)  U/L


 


Alkaline Phosphatase   97  ()  U/L


 


Total Protein   8.5 H  (6.3-8.2)  g/dL


 


Albumin   4.7  (3.5-5.0)  g/dL


 


Lipase   132  ()  U/L














Disposition


Clinical Impression: 


 Nausea & vomiting, Abdominal pain





Disposition: HOME SELF-CARE


Condition: Fair


Instructions (If sedation given, give patient instructions):  Acute Nausea and 

Vomiting (ED), Abdominal Pain (ED)


Is patient prescribed a controlled substance at d/c from ED?: No


Referrals: 


Tyra Marvin MD [Primary Care Provider] - 1-2 days

## 2022-05-08 ENCOUNTER — HOSPITAL ENCOUNTER (EMERGENCY)
Dept: HOSPITAL 47 - EC | Age: 42
Discharge: OTHER INSTITUTION | End: 2022-05-08
Payer: COMMERCIAL

## 2022-05-08 VITALS — SYSTOLIC BLOOD PRESSURE: 126 MMHG | HEART RATE: 78 BPM | TEMPERATURE: 98.1 F | DIASTOLIC BLOOD PRESSURE: 69 MMHG

## 2022-05-08 VITALS — RESPIRATION RATE: 18 BRPM

## 2022-05-08 DIAGNOSIS — Z88.5: ICD-10-CM

## 2022-05-08 DIAGNOSIS — Z88.8: ICD-10-CM

## 2022-05-08 DIAGNOSIS — Z91.040: ICD-10-CM

## 2022-05-08 DIAGNOSIS — R10.9: ICD-10-CM

## 2022-05-08 DIAGNOSIS — Z98.890: ICD-10-CM

## 2022-05-08 DIAGNOSIS — Z79.899: ICD-10-CM

## 2022-05-08 DIAGNOSIS — R11.2: Primary | ICD-10-CM

## 2022-05-08 DIAGNOSIS — Z90.49: ICD-10-CM

## 2022-05-08 DIAGNOSIS — Z20.822: ICD-10-CM

## 2022-05-08 DIAGNOSIS — K21.9: ICD-10-CM

## 2022-05-08 LAB
ALBUMIN SERPL-MCNC: 4.7 G/DL (ref 3.5–5)
ALP SERPL-CCNC: 103 U/L (ref 38–126)
ALT SERPL-CCNC: 30 U/L (ref 4–49)
AMYLASE SERPL-CCNC: 105 U/L (ref 30–110)
ANION GAP SERPL CALC-SCNC: 17 MMOL/L
AST SERPL-CCNC: 25 U/L (ref 17–59)
BASOPHILS # BLD AUTO: 0.1 K/UL (ref 0–0.2)
BASOPHILS NFR BLD AUTO: 1 %
BUN SERPL-SCNC: 13 MG/DL (ref 9–20)
CALCIUM SPEC-MCNC: 10 MG/DL (ref 8.4–10.2)
CHLORIDE SERPL-SCNC: 110 MMOL/L (ref 98–107)
CO2 SERPL-SCNC: 19 MMOL/L (ref 22–30)
EOSINOPHIL # BLD AUTO: 0.1 K/UL (ref 0–0.7)
EOSINOPHIL NFR BLD AUTO: 1 %
ERYTHROCYTE [DISTWIDTH] IN BLOOD BY AUTOMATED COUNT: 4.83 M/UL (ref 4.3–5.9)
ERYTHROCYTE [DISTWIDTH] IN BLOOD: 19.2 % (ref 11.5–15.5)
GLUCOSE SERPL-MCNC: 171 MG/DL (ref 74–99)
HCT VFR BLD AUTO: 42 % (ref 39–53)
HGB BLD-MCNC: 12.9 GM/DL (ref 13–17.5)
LIPASE SERPL-CCNC: 82 U/L (ref 23–300)
LYMPHOCYTES # SPEC AUTO: 0.7 K/UL (ref 1–4.8)
LYMPHOCYTES NFR SPEC AUTO: 7 %
MCH RBC QN AUTO: 26.8 PG (ref 25–35)
MCHC RBC AUTO-ENTMCNC: 30.8 G/DL (ref 31–37)
MCV RBC AUTO: 87 FL (ref 80–100)
MONOCYTES # BLD AUTO: 0.3 K/UL (ref 0–1)
MONOCYTES NFR BLD AUTO: 2 %
NEUTROPHILS # BLD AUTO: 10.2 K/UL (ref 1.3–7.7)
NEUTROPHILS NFR BLD AUTO: 90 %
PLATELET # BLD AUTO: 333 K/UL (ref 150–450)
POTASSIUM SERPL-SCNC: 4 MMOL/L (ref 3.5–5.1)
PROT SERPL-MCNC: 7.9 G/DL (ref 6.3–8.2)
SODIUM SERPL-SCNC: 146 MMOL/L (ref 137–145)
WBC # BLD AUTO: 11.4 K/UL (ref 3.8–10.6)

## 2022-05-08 PROCEDURE — 82150 ASSAY OF AMYLASE: CPT

## 2022-05-08 PROCEDURE — 74019 RADEX ABDOMEN 2 VIEWS: CPT

## 2022-05-08 PROCEDURE — 36415 COLL VENOUS BLD VENIPUNCTURE: CPT

## 2022-05-08 PROCEDURE — 85025 COMPLETE CBC W/AUTO DIFF WBC: CPT

## 2022-05-08 PROCEDURE — 87635 SARS-COV-2 COVID-19 AMP PRB: CPT

## 2022-05-08 PROCEDURE — 83690 ASSAY OF LIPASE: CPT

## 2022-05-08 PROCEDURE — 87502 INFLUENZA DNA AMP PROBE: CPT

## 2022-05-08 PROCEDURE — 96375 TX/PRO/DX INJ NEW DRUG ADDON: CPT

## 2022-05-08 PROCEDURE — 99285 EMERGENCY DEPT VISIT HI MDM: CPT

## 2022-05-08 PROCEDURE — 96374 THER/PROPH/DIAG INJ IV PUSH: CPT

## 2022-05-08 PROCEDURE — 96361 HYDRATE IV INFUSION ADD-ON: CPT

## 2022-05-08 PROCEDURE — 80053 COMPREHEN METABOLIC PANEL: CPT

## 2022-05-08 NOTE — XR
Abdomen.

 

HISTORY: Nausea and vomiting.

 

COMPARISON: 4/11/2022.

 

3 views of the abdomen were obtained including supine and upright views.

 

FINDINGS:

 

Lung bases are clear and there is no free air beneath the hemidiaphragms.

 

Bowel gas pattern reveals suggestion of a few differential air-fluid levels without dilated loops of 
bowel. Findings could represent an early or partial distal small bowel obstruction.

 

There are postsurgical changes in the lumbar spine at the L5-S1 level. There is multiple surgical sut
ures in the left upper quadrant. No suspicious abdominal or pelvic calcifications are seen.

 

Impression:

 

Cannot exclude early or partial distal small bowel obstruction.

## 2022-05-08 NOTE — ED
General Adult HPI





- General


Chief complaint: Nausea/Vomiting/Diarrhea


Stated complaint: Vomiting


Time Seen by Provider: 22 12:00


Source: patient


Mode of arrival: ambulatory


Limitations: no limitations





- History of Present Illness


Initial comments: 





This 41-year-old male presents emergency department with nausea and vomiting 

that began at 4:30 AM.  Patient states he has a history of hiatal hernia repair 

and episodes of intractable nausea and vomiting.  Patient states he does have a 

gastroenterologist at [facility] which he did see 2-3 weeks ago

when he was admitted inpatient.  Patient states he is prescribed Oxy at home for

pain, however he states he does not have any left.  Patient states he does have 

generalized abdominal pain which is common when he gets the cyclical nausea and 

vomiting.  Patient denies any radiation.  Patient states the pain is 10/10.  

Patient denies any blood present in vomit.  Patient states it was dark brown 

this morning when he was vomiting.  Patient states he has vomited around 2 times

per hour.  Patient states his last bowel movement was 3 days ago.  Patient 

states he did have an endoscopy sometime in  which do not have any acute 

abnormalities.  Patient states he has had a colonoscopy but states he is unsure 

when it was.  Patient denies any chest pain, shortness breath, fever, change in 

bowel or bladder, change in vision, headache, lightheadedness, dizziness.  

Patient states his appetite was normal up until this morning when he woke up at 

4 AM, he states appetite has decreased and is not very hungry since he has been 

vomiting.  Patient states he is able to keep down some water.  Patient 

requesting morphine and Zofran as soon as I entered the room.





- Related Data


                                Home Medications











 Medication  Instructions  Recorded  Confirmed


 


traZODone HCL [Desyrel] 100 mg PO HS 01/30/22 04/10/22


 


Acetaminophen Tab [Tylenol Tab] 1,000 mg PO Q6H PRN 02/26/22 04/10/22


 


Gabapentin 300 mg PO BID 02/26/22 04/10/22


 


oxyCODONE HCL [OxyIR] 5 mg PO TID 02/26/22 04/10/22


 


Famotidine [Pepcid] 20 mg PO Q12H 03/05/22 04/10/22


 


Ibuprofen [Motrin] 600 mg PO Q8H PRN 03/05/22 04/10/22


 


Cyclobenzaprine [Flexeril] 5 mg PO TID PRN 04/03/22 04/10/22


 


Ondansetron [Zofran] 4 mg PO Q8H PRN 04/03/22 04/10/22


 


OLANZapine [ZyPREXA] 5 mg PO HS 04/10/22 04/10/22











                                    Allergies











Allergy/AdvReac Type Severity Reaction Status Date / Time


 


hydromorphone [From Dilaudid] Allergy  Rash/Hives Verified 22 11:50


 


latex Allergy  Rash/Hives Verified 22 11:50


 


meperidine [From Demerol] Allergy  Rash/Hives Verified 22 11:50














Review of Systems


ROS Statement: 


Those systems with pertinent positive or pertinent negative responses have been 

documented in the HPI.





ROS Other: All systems not noted in ROS Statement are negative.





Past Medical History


Past Medical History: GERD/Reflux


Additional Past Medical History / Comment(s): multiple dislocation of rt 

shoulder, hiatal hernia, hx migraines, IBS


History of Any Multi-Drug Resistant Organisms: None Reported


Past Surgical History: Appendectomy, Back Surgery, Cholecystectomy, Orthopedic 

Surgery, Tonsillectomy


Additional Past Surgical History / Comment(s): rt shoulder arthroscopy, left 

shoulder fusion, hiatal hernia surgery. Peg tube


Past Anesthesia/Blood Transfusion Reactions: No Reported Reaction


Past Psychological History: No Psychological Hx Reported


Smoking Status: Never smoker


Past Alcohol Use History: None Reported


Past Drug Use History: None Reported





- Past Family History


  ** Mother


Additional Family Medical History / Comment(s): lupus, 





  ** Father


History Unknown: Yes





  ** Brother(s)


Additional Family Medical History / Comment(s): autoimmune disorder





General Exam


Limitations: no limitations


General appearance: alert, in no apparent distress


Head exam: Present: atraumatic, normocephalic, normal inspection


Eye exam: Present: normal appearance, PERRL, EOMI.  Absent: scleral icterus, 

conjunctival injection, periorbital swelling


Pupils: Present: normal accommodation


ENT exam: Present: normal exam, normal oropharynx, mucous membranes moist


Neck exam: Present: normal inspection, full ROM.  Absent: tenderness, 

meningismus, lymphadenopathy


Respiratory exam: Present: normal lung sounds bilaterally.  Absent: respiratory 

distress, wheezes, rales, rhonchi, stridor, chest wall tenderness


Cardiovascular Exam: Present: regular rate, normal rhythm, normal heart sounds. 

Absent: systolic murmur, diastolic murmur, rubs, gallop, clicks


GI/Abdominal exam: Present: soft, tenderness (Generalized abdominal discomfort 

in all quadrants), normal bowel sounds.  Absent: distended, guarding, rebound, 

rigid


Extremities exam: Present: normal inspection, full ROM, normal capillary refill.

 Absent: tenderness, pedal edema, joint swelling, calf tenderness


Back exam: Present: full ROM.  Absent: CVA tenderness (R), CVA tenderness (L), 

paraspinal tenderness, vertebral tenderness


Neurological exam: Present: alert, oriented X3, CN II-XII intact, normal gait


Psychiatric exam: Present: normal affect, normal mood


Skin exam: Present: warm, dry, intact, normal color.  Absent: rash





Course


                                   Vital Signs











  22





  11:46 14:59


 


Temperature 97.6 F 


 


Pulse Rate 98 81


 


Respiratory 24 18





Rate  


 


Blood Pressure 120/76 131/68


 


O2 Sat by Pulse 100 98





Oximetry  














Medical Decision Making





- Medical Decision Making





This 41-year-old male presents emergency department with intractable nausea and 

vomiting that began at 4 AM this morning. Abdominal x-ray impression: Cannot 

exclude early or partial distal small bowel obstruction. Patient with a blood 

cells 11.4. COVID-19, influenza A/B negative. Spoke with Dr. Marvin who suggested

I transfer patient to Dr. Reed with [facility], where his 

gastroenterologist is located.  I spoke with Dr. Waggoner, ER physician who 

accepted patient.  Patient agreed to be transferred to [facility] for 

further workup, evaluation and treatment.  Patient transferred to [facility].  Discussed case in detail with my attending, .





- Lab Data


Result diagrams: 


                                 22 12:31





                                 22 12:31


                                   Lab Results











  22 Range/Units





  12:31 12:31 12:37 


 


WBC  11.4 H    (3.8-10.6)  k/uL


 


RBC  4.83    (4.30-5.90)  m/uL


 


Hgb  12.9 L    (13.0-17.5)  gm/dL


 


Hct  42.0    (39.0-53.0)  %


 


MCV  87.0    (80.0-100.0)  fL


 


MCH  26.8    (25.0-35.0)  pg


 


MCHC  30.8 L    (31.0-37.0)  g/dL


 


RDW  19.2 H    (11.5-15.5)  %


 


Plt Count  333    (150-450)  k/uL


 


MPV  7.5    


 


Neutrophils %  90    %


 


Lymphocytes %  7    %


 


Monocytes %  2    %


 


Eosinophils %  1    %


 


Basophils %  1    %


 


Neutrophils #  10.2 H    (1.3-7.7)  k/uL


 


Lymphocytes #  0.7 L    (1.0-4.8)  k/uL


 


Monocytes #  0.3    (0-1.0)  k/uL


 


Eosinophils #  0.1    (0-0.7)  k/uL


 


Basophils #  0.1    (0-0.2)  k/uL


 


Hypochromasia  Slight    


 


Anisocytosis  Slight    


 


Microcytosis  Slight    


 


Sodium   146 H   (137-145)  mmol/L


 


Potassium   4.0   (3.5-5.1)  mmol/L


 


Chloride   110 H   ()  mmol/L


 


Carbon Dioxide   19 L   (22-30)  mmol/L


 


Anion Gap   17   mmol/L


 


BUN   13   (9-20)  mg/dL


 


Creatinine   0.84   (0.66-1.25)  mg/dL


 


Est GFR (CKD-EPI)AfAm   >90   (>60 ml/min/1.73 sqM)  


 


Est GFR (CKD-EPI)NonAf   >90   (>60 ml/min/1.73 sqM)  


 


Glucose   171 H   (74-99)  mg/dL


 


Calcium   10.0   (8.4-10.2)  mg/dL


 


Total Bilirubin   0.6   (0.2-1.3)  mg/dL


 


AST   25   (17-59)  U/L


 


ALT   30   (4-49)  U/L


 


Alkaline Phosphatase   103   ()  U/L


 


Total Protein   7.9   (6.3-8.2)  g/dL


 


Albumin   4.7   (3.5-5.0)  g/dL


 


Amylase   105   ()  U/L


 


Lipase   82   ()  U/L


 


Coronavirus (PCR)     (Not Detectd)  


 


Influenza Type A RNA    Not Detected  (Not Detectd)  


 


Influenza Type B (PCR)    Not Detected  (Not Detectd)  














  22 Range/Units





  12:37 


 


WBC   (3.8-10.6)  k/uL


 


RBC   (4.30-5.90)  m/uL


 


Hgb   (13.0-17.5)  gm/dL


 


Hct   (39.0-53.0)  %


 


MCV   (80.0-100.0)  fL


 


MCH   (25.0-35.0)  pg


 


MCHC   (31.0-37.0)  g/dL


 


RDW   (11.5-15.5)  %


 


Plt Count   (150-450)  k/uL


 


MPV   


 


Neutrophils %   %


 


Lymphocytes %   %


 


Monocytes %   %


 


Eosinophils %   %


 


Basophils %   %


 


Neutrophils #   (1.3-7.7)  k/uL


 


Lymphocytes #   (1.0-4.8)  k/uL


 


Monocytes #   (0-1.0)  k/uL


 


Eosinophils #   (0-0.7)  k/uL


 


Basophils #   (0-0.2)  k/uL


 


Hypochromasia   


 


Anisocytosis   


 


Microcytosis   


 


Sodium   (137-145)  mmol/L


 


Potassium   (3.5-5.1)  mmol/L


 


Chloride   ()  mmol/L


 


Carbon Dioxide   (22-30)  mmol/L


 


Anion Gap   mmol/L


 


BUN   (9-20)  mg/dL


 


Creatinine   (0.66-1.25)  mg/dL


 


Est GFR (CKD-EPI)AfAm   (>60 ml/min/1.73 sqM)  


 


Est GFR (CKD-EPI)NonAf   (>60 ml/min/1.73 sqM)  


 


Glucose   (74-99)  mg/dL


 


Calcium   (8.4-10.2)  mg/dL


 


Total Bilirubin   (0.2-1.3)  mg/dL


 


AST   (17-59)  U/L


 


ALT   (4-49)  U/L


 


Alkaline Phosphatase   ()  U/L


 


Total Protein   (6.3-8.2)  g/dL


 


Albumin   (3.5-5.0)  g/dL


 


Amylase   ()  U/L


 


Lipase   ()  U/L


 


Coronavirus (PCR)  Not Detected  (Not Detectd)  


 


Influenza Type A RNA   (Not Detectd)  


 


Influenza Type B (PCR)   (Not Detectd)  














Disposition


Clinical Impression: 


 Intractable nausea and vomiting, Abdominal pain





Disposition: OTHER INSTITUTION NOT DEFINED


Condition: Serious


Is patient prescribed a controlled substance at d/c from ED?: No


Referrals: 


Tyra Marvin MD [Primary Care Provider] - 1-2 days





- Out of Hospital Transfer - Req. Specs


Out of Hospital Transfer - Requested Specifics: Other Emergency Center ([facility])

## 2022-06-26 ENCOUNTER — HOSPITAL ENCOUNTER (EMERGENCY)
Dept: HOSPITAL 47 - EC | Age: 42
LOS: 1 days | Discharge: HOME | End: 2022-06-27
Payer: COMMERCIAL

## 2022-06-26 VITALS — TEMPERATURE: 98 F

## 2022-06-26 DIAGNOSIS — R10.12: ICD-10-CM

## 2022-06-26 DIAGNOSIS — R11.2: ICD-10-CM

## 2022-06-26 DIAGNOSIS — K94.21: Primary | ICD-10-CM

## 2022-06-26 DIAGNOSIS — Z79.899: ICD-10-CM

## 2022-06-26 DIAGNOSIS — K21.9: ICD-10-CM

## 2022-06-26 LAB
ANION GAP SERPL CALC-SCNC: 11 MMOL/L
BASOPHILS # BLD AUTO: 0.1 K/UL (ref 0–0.2)
BASOPHILS NFR BLD AUTO: 1 %
BUN SERPL-SCNC: 17 MG/DL (ref 9–20)
CALCIUM SPEC-MCNC: 9.2 MG/DL (ref 8.4–10.2)
CHLORIDE SERPL-SCNC: 106 MMOL/L (ref 98–107)
CO2 SERPL-SCNC: 22 MMOL/L (ref 22–30)
EOSINOPHIL # BLD AUTO: 0.1 K/UL (ref 0–0.7)
EOSINOPHIL NFR BLD AUTO: 1 %
ERYTHROCYTE [DISTWIDTH] IN BLOOD BY AUTOMATED COUNT: 4.76 M/UL (ref 4.3–5.9)
ERYTHROCYTE [DISTWIDTH] IN BLOOD: 16.7 % (ref 11.5–15.5)
GLUCOSE SERPL-MCNC: 119 MG/DL (ref 74–99)
HCT VFR BLD AUTO: 42 % (ref 39–53)
HGB BLD-MCNC: 13.4 GM/DL (ref 13–17.5)
LYMPHOCYTES # SPEC AUTO: 2.1 K/UL (ref 1–4.8)
LYMPHOCYTES NFR SPEC AUTO: 26 %
MCH RBC QN AUTO: 28.1 PG (ref 25–35)
MCHC RBC AUTO-ENTMCNC: 31.8 G/DL (ref 31–37)
MCV RBC AUTO: 88.3 FL (ref 80–100)
MONOCYTES # BLD AUTO: 0.5 K/UL (ref 0–1)
MONOCYTES NFR BLD AUTO: 7 %
NEUTROPHILS # BLD AUTO: 5.4 K/UL (ref 1.3–7.7)
NEUTROPHILS NFR BLD AUTO: 65 %
PLATELET # BLD AUTO: 265 K/UL (ref 150–450)
POTASSIUM SERPL-SCNC: 3.9 MMOL/L (ref 3.5–5.1)
SODIUM SERPL-SCNC: 139 MMOL/L (ref 137–145)
WBC # BLD AUTO: 8.2 K/UL (ref 3.8–10.6)

## 2022-06-26 PROCEDURE — 84484 ASSAY OF TROPONIN QUANT: CPT

## 2022-06-26 PROCEDURE — 96375 TX/PRO/DX INJ NEW DRUG ADDON: CPT

## 2022-06-26 PROCEDURE — 74176 CT ABD & PELVIS W/O CONTRAST: CPT

## 2022-06-26 PROCEDURE — 85025 COMPLETE CBC W/AUTO DIFF WBC: CPT

## 2022-06-26 PROCEDURE — 36415 COLL VENOUS BLD VENIPUNCTURE: CPT

## 2022-06-26 PROCEDURE — 93005 ELECTROCARDIOGRAM TRACING: CPT

## 2022-06-26 PROCEDURE — 99284 EMERGENCY DEPT VISIT MOD MDM: CPT

## 2022-06-26 PROCEDURE — 96361 HYDRATE IV INFUSION ADD-ON: CPT

## 2022-06-26 PROCEDURE — 80048 BASIC METABOLIC PNL TOTAL CA: CPT

## 2022-06-26 PROCEDURE — 96376 TX/PRO/DX INJ SAME DRUG ADON: CPT

## 2022-06-26 PROCEDURE — 96374 THER/PROPH/DIAG INJ IV PUSH: CPT

## 2022-06-26 NOTE — ED
General Adult HPI





- General


Chief complaint: Recheck/Abnormal Lab/Rx


Stated complaint: post feeding tube issues


Time Seen by Provider: 22 19:37


Source: patient, RN notes reviewed


Mode of arrival: ambulatory


Limitations: no limitations





- History of Present Illness


Initial comments: 





42-year-old male presents to the emergency department for evaluation of bleeding

from site of PEG tube that was removed on Wednesday.  Patient states he had been

changing the dressing as directed in his discharge instructions and was having 

some amount of drainage. States this afternoon he developed a large amount of 

bleeding that site. Denies any injury or trauma to abdominal wall. Did not 

contact his surgeon prior to arrival. Complains of tenderness around the site 

and describes his pain "as if someone had repeatedly punched" him. Denies fever,

chills, headache, chest pain, shortness of breath, difficulty breathing, nausea,

vomiting, diarrhea, dysuria, trauma, or injury.





- Related Data


                                Home Medications











 Medication  Instructions  Recorded  Confirmed


 


traZODone HCL [Desyrel] 100 mg PO HS 01/30/22 04/10/22


 


Acetaminophen Tab [Tylenol Tab] 1,000 mg PO Q6H PRN 02/26/22 04/10/22


 


Gabapentin 300 mg PO BID 02/26/22 04/10/22


 


oxyCODONE HCL [OxyIR] 5 mg PO TID 02/26/22 04/10/22


 


Famotidine [Pepcid] 20 mg PO Q12H 03/05/22 04/10/22


 


Ibuprofen [Motrin] 600 mg PO Q8H PRN 03/05/22 04/10/22


 


Cyclobenzaprine [Flexeril] 5 mg PO TID PRN 04/03/22 04/10/22


 


Ondansetron [Zofran] 4 mg PO Q8H PRN 04/03/22 04/10/22


 


OLANZapine [ZyPREXA] 5 mg PO HS 04/10/22 04/10/22








                                  Previous Rx's











 Medication  Instructions  Recorded


 


Ondansetron Odt [Zofran Odt] 4 mg PO Q8HR PRN #10 tab 22











                                    Allergies











Allergy/AdvReac Type Severity Reaction Status Date / Time


 


hydromorphone [From Dilaudid] Allergy  Rash/Hives Verified 22 18:25


 


latex Allergy  Rash/Hives Verified 22 18:25


 


meperidine [From Demerol] Allergy  Rash/Hives Verified 22 18:25














Review of Systems


ROS Statement: 


Those systems with pertinent positive or pertinent negative responses have been 

documented in the HPI.





ROS Other: All systems not noted in ROS Statement are negative.





Past Medical History


Past Medical History: GERD/Reflux


Additional Past Medical History / Comment(s): multiple dislocation of rt 

shoulder, hiatal hernia, hx migraines, IBS


History of Any Multi-Drug Resistant Organisms: None Reported


Past Surgical History: Appendectomy, Back Surgery, Cholecystectomy, Orthopedic 

Surgery, Tonsillectomy


Additional Past Surgical History / Comment(s): rt shoulder arthroscopy, left 

shoulder fusion, hiatal hernia surgery. Peg tube


Past Anesthesia/Blood Transfusion Reactions: No Reported Reaction


Past Psychological History: No Psychological Hx Reported


Smoking Status: Never smoker


Past Alcohol Use History: None Reported


Past Drug Use History: None Reported





- Past Family History


  ** Mother


Additional Family Medical History / Comment(s): lupus, 





  ** Father


History Unknown: Yes





  ** Brother(s)


Additional Family Medical History / Comment(s): autoimmune disorder





General Exam


Limitations: no limitations (Well-developed, well-nourished male in no acute 

distress.  Initial temperature 98.0, pulse 97, respirations 18, blood pressure 

125/83, pulse ox 99% on room air.)


General appearance: alert, in no apparent distress


ENT exam: Present: normal exam, normal oropharynx, mucous membranes moist


Respiratory exam: Present: normal lung sounds bilaterally.  Absent: respiratory 

distress, wheezes, rales, rhonchi, stridor, chest wall tenderness


Cardiovascular Exam: Present: regular rate, normal rhythm, normal heart sounds. 

Absent: systolic murmur, diastolic murmur, rubs, gallop, clicks


GI/Abdominal exam: Present: soft, tenderness (tenderness upon palpation around 

the site of PEG tube removal in the LUQ. ), normal bowel sounds, other (patient 

arrives with blood saturated dressing. upon removal, there is no active drainage

or bleeding. site is soft, non-erythematous, and appears to be well-healing.).  

Absent: distended, guarding, rebound, rigid


Extremities exam: Present: normal inspection, full ROM


Back exam: Present: normal inspection, full ROM.  Absent: paraspinal tenderness,

vertebral tenderness


Neurological exam: Present: alert, oriented X3


Psychiatric exam: Present: anxious


Skin exam: Present: warm, dry, intact, normal color.  Absent: rash





Course


                                   Vital Signs











  22





  18:22 21:25


 


Temperature 98.0 F 


 


Pulse Rate 97 93


 


Respiratory 18 16





Rate  


 


Blood Pressure 125/83 116/73


 


O2 Sat by Pulse 99 100





Oximetry  














- Reevaluation(s)


Reevaluation #1: 





22 22:50


Upon reassessment, patient continues to complain of ongoing pain and nausea, 

though does appear more comfortable at this time.  Additional dose of pain 

medication will be ordered.  Awaiting CT results.  Dressing remains clean and 

dry with no active bleeding at this time.





22 00:05


Informed by the nurse that patient's abdominal wound dressing was saturated as s

he was preparing to discharge him. I reassessed the site and was unable to 

elicit or reproduce any additional bleeding with palpation or position change.  

Clean dressing was applied.  I did speak with my attending who recommends 

contacting patient's surgeon at [facility] for further direction.





22 00:30


I spoke with Dr. Reed regarding physical exam findings and bleeding from site.

As patient is hemodynamically stable and bleeding is intermittent, he suggests 

discharge home and outpatient follow up as scheduled.














Medical Decision Making





- Medical Decision Making





This is a 42-year-old male who is well-known to this department presenting with 

complaints of bleeding from site of PEG tube that was removed on Wednesday.  

Upon exam, patient is well-appearing and in no acute distress.  Vital signs are 

stable.  He complains of pain around the left side of his mid to upper abdomen 

stating that it feels as if he has been "repeatedly punched."  After initial 

saturated dressing was removed there was no active bleeding or drainage from the

site.  During his stay, patient developed epigastric discomfort accompanied by 

vomiting.  EKG was obtained showing sinus rhythm with no ectopy or ST segment 

changes.  Troponin was negative.  CT of the abdomen and pelvis was obtained and 

was unremarkable.  Patient was given IV fluids, pain medicine, and Zofran with 

some improvement.  Just prior to departure patient reported another episode of b

leeding.  Site was again examined, palpated, and patient was repositioned, but 

the bleeding could not be replicated. I did speak with the patient's surgeon at 

[facility] who is very familiar with this patient. As patient is hemodynamically 

stable he will be discharged home to follow up on an outpatient basis.  He was 

prescribed Zofran for his nausea.  States he is no longer taking pain medication

and his last dose of Oxy was 4 weeks ago.  Return parameters were discussed in 

detail.  Patient verbalizes understanding and agrees with this plan. Attending: 

Elizabeth.





- Lab Data


Result diagrams: 


                                 22 20:00





                                 22 20:00


                                   Lab Results











  22 Range/Units





  20:00 20:00 20:00 


 


WBC  8.2    (3.8-10.6)  k/uL


 


RBC  4.76    (4.30-5.90)  m/uL


 


Hgb  13.4    (13.0-17.5)  gm/dL


 


Hct  42.0    (39.0-53.0)  %


 


MCV  88.3    (80.0-100.0)  fL


 


MCH  28.1    (25.0-35.0)  pg


 


MCHC  31.8    (31.0-37.0)  g/dL


 


RDW  16.7 H    (11.5-15.5)  %


 


Plt Count  265    (150-450)  k/uL


 


MPV  8.0    


 


Neutrophils %  65    %


 


Lymphocytes %  26    %


 


Monocytes %  7    %


 


Eosinophils %  1    %


 


Basophils %  1    %


 


Neutrophils #  5.4    (1.3-7.7)  k/uL


 


Lymphocytes #  2.1    (1.0-4.8)  k/uL


 


Monocytes #  0.5    (0-1.0)  k/uL


 


Eosinophils #  0.1    (0-0.7)  k/uL


 


Basophils #  0.1    (0-0.2)  k/uL


 


Anisocytosis  Slight    


 


Sodium   139   (137-145)  mmol/L


 


Potassium   3.9   (3.5-5.1)  mmol/L


 


Chloride   106   ()  mmol/L


 


Carbon Dioxide   22   (22-30)  mmol/L


 


Anion Gap   11   mmol/L


 


BUN   17   (9-20)  mg/dL


 


Creatinine   0.72   (0.66-1.25)  mg/dL


 


Est GFR (CKD-EPI)AfAm   >90   (>60 ml/min/1.73 sqM)  


 


Est GFR (CKD-EPI)NonAf   >90   (>60 ml/min/1.73 sqM)  


 


Glucose   119 H   (74-99)  mg/dL


 


Calcium   9.2   (8.4-10.2)  mg/dL


 


Troponin I    <0.012  (0.000-0.034)  ng/mL














- EKG Data


EKG shows normal: sinus rhythm


Rate: normal


EKG Comments: 





EKG was obtained at 2102 showing sinus rhythm with possible right ventricular 

conduction delay.  Ventricular rate 90, ME interval 179, QRS duration 86, QT/QTC

345/393.  Interpretation borderline ECG.





- Radiology Data


Radiology results: report reviewed, image reviewed


CT of the abdomen and pelvis without contrast was obtained.  Report was reviewed

in its entirety.  Impression per Dr. Ramon is previous surgery.  No acute 

abnormality in the abdomen and pelvis.  No adverse change compared to old exam.





Disposition


Clinical Impression: 


 Abdominal pain, Increased wound drainage, Nausea & vomiting





Disposition: HOME SELF-CARE


Condition: Stable


Instructions (If sedation given, give patient instructions):  Abdominal Pain 

(ED)


Additional Instructions: 


May take Zofran for nausea.


Continue taking your home medications as prescribed.


Apply clean dressing as needed. Avoid trauma or injury to abdominal area.


Call your surgeon in the morning to schedule a follow-up appointment.


Return to the emergency department with any new, worsening, or concerning 

symptoms.


Prescriptions: 


Ondansetron Odt [Zofran Odt] 4 mg PO Q8HR PRN #10 tab


 PRN Reason: Nausea


Is patient prescribed a controlled substance at d/c from ED?: No


Referrals: 


Tyra Marvin MD [Primary Care Provider] - 1-2 days


Time of Disposition: 23:26

## 2022-06-26 NOTE — CT
EXAMINATION TYPE: CT abdomen pelvis wo con

 

DATE OF EXAM: 6/26/2022

 

COMPARISON: 4/10/2022

 

HISTORY: feeding tube problems

 

CT DLP: 1139.4 mGycm

Automated exposure control for dose reduction was used.

 

Images obtained from the diaphragm to the floor the pelvis with no contrast.

 

There is some mild atelectasis left lung base. There is hilar hernia. There is previous gastric surge
ry. Liver is intact. The bile ducts are not dilated. Gallbladder is absent. There are clips from chol
ecystectomy. Spleen is intact. No pancreatic mass.

 

There is no adrenal mass. Kidneys show normal size and contour. No hydronephrosis. Ureters are not di
lated. Bladder distends smoothly. No inguinal hernia. No free fluid in the pelvis. No pelvic mass. Th
ere is posterior fusion surgery at L5-S1. No lumbar compression fracture. Bony pelvis is intact. The 
hip joints are intact. Appendix not seen. No sign of thickened appendix.

 

No mesenteric edema. No ascites or free air. No sign of a bowel obstruction.

 

IMPRESSION:

Previous surgery. No acute abnormality in the abdomen and pelvis. No adverse change compared to old e
xam.

## 2022-06-27 VITALS — RESPIRATION RATE: 16 BRPM | DIASTOLIC BLOOD PRESSURE: 73 MMHG | SYSTOLIC BLOOD PRESSURE: 116 MMHG | HEART RATE: 93 BPM

## 2022-06-28 ENCOUNTER — HOSPITAL ENCOUNTER (EMERGENCY)
Dept: HOSPITAL 47 - EC | Age: 42
Discharge: HOME | End: 2022-06-28
Payer: COMMERCIAL

## 2022-06-28 VITALS — HEART RATE: 62 BPM | TEMPERATURE: 98 F | DIASTOLIC BLOOD PRESSURE: 88 MMHG | SYSTOLIC BLOOD PRESSURE: 122 MMHG

## 2022-06-28 VITALS — RESPIRATION RATE: 18 BRPM

## 2022-06-28 DIAGNOSIS — R10.9: Primary | ICD-10-CM

## 2022-06-28 DIAGNOSIS — R11.10: ICD-10-CM

## 2022-06-28 DIAGNOSIS — Z91.040: ICD-10-CM

## 2022-06-28 DIAGNOSIS — Z88.5: ICD-10-CM

## 2022-06-28 LAB
ALBUMIN SERPL-MCNC: 4.8 G/DL (ref 3.5–5)
ALP SERPL-CCNC: 215 U/L (ref 38–126)
ALT SERPL-CCNC: 234 U/L (ref 4–49)
AMYLASE SERPL-CCNC: 156 U/L (ref 30–110)
ANION GAP SERPL CALC-SCNC: 15 MMOL/L
APTT BLD: 23.4 SEC (ref 22–30)
AST SERPL-CCNC: 121 U/L (ref 17–59)
BASOPHILS # BLD AUTO: 0 K/UL (ref 0–0.2)
BASOPHILS NFR BLD AUTO: 0 %
BUN SERPL-SCNC: 16 MG/DL (ref 9–20)
CALCIUM SPEC-MCNC: 9.7 MG/DL (ref 8.4–10.2)
CHLORIDE SERPL-SCNC: 106 MMOL/L (ref 98–107)
CO2 SERPL-SCNC: 21 MMOL/L (ref 22–30)
EOSINOPHIL # BLD AUTO: 0 K/UL (ref 0–0.7)
EOSINOPHIL NFR BLD AUTO: 0 %
ERYTHROCYTE [DISTWIDTH] IN BLOOD BY AUTOMATED COUNT: 5.1 M/UL (ref 4.3–5.9)
ERYTHROCYTE [DISTWIDTH] IN BLOOD: 16.6 % (ref 11.5–15.5)
GLUCOSE SERPL-MCNC: 116 MG/DL (ref 74–99)
HCT VFR BLD AUTO: 45.6 % (ref 39–53)
HGB BLD-MCNC: 14.7 GM/DL (ref 13–17.5)
INR PPP: 1 (ref ?–1.2)
LIPASE SERPL-CCNC: 135 U/L (ref 23–300)
LYMPHOCYTES # SPEC AUTO: 0.9 K/UL (ref 1–4.8)
LYMPHOCYTES NFR SPEC AUTO: 8 %
MCH RBC QN AUTO: 28.8 PG (ref 25–35)
MCHC RBC AUTO-ENTMCNC: 32.2 G/DL (ref 31–37)
MCV RBC AUTO: 89.3 FL (ref 80–100)
MONOCYTES # BLD AUTO: 0.4 K/UL (ref 0–1)
MONOCYTES NFR BLD AUTO: 3 %
NEUTROPHILS # BLD AUTO: 10 K/UL (ref 1.3–7.7)
NEUTROPHILS NFR BLD AUTO: 88 %
PLATELET # BLD AUTO: 293 K/UL (ref 150–450)
POTASSIUM SERPL-SCNC: 3.5 MMOL/L (ref 3.5–5.1)
PROT SERPL-MCNC: 7.8 G/DL (ref 6.3–8.2)
PT BLD: 11 SEC (ref 9–12)
SODIUM SERPL-SCNC: 142 MMOL/L (ref 137–145)
WBC # BLD AUTO: 11.3 K/UL (ref 3.8–10.6)

## 2022-06-28 PROCEDURE — 96375 TX/PRO/DX INJ NEW DRUG ADDON: CPT

## 2022-06-28 PROCEDURE — 36415 COLL VENOUS BLD VENIPUNCTURE: CPT

## 2022-06-28 PROCEDURE — 96376 TX/PRO/DX INJ SAME DRUG ADON: CPT

## 2022-06-28 PROCEDURE — 83690 ASSAY OF LIPASE: CPT

## 2022-06-28 PROCEDURE — 93005 ELECTROCARDIOGRAM TRACING: CPT

## 2022-06-28 PROCEDURE — 85610 PROTHROMBIN TIME: CPT

## 2022-06-28 PROCEDURE — 96374 THER/PROPH/DIAG INJ IV PUSH: CPT

## 2022-06-28 PROCEDURE — 85379 FIBRIN DEGRADATION QUANT: CPT

## 2022-06-28 PROCEDURE — 74018 RADEX ABDOMEN 1 VIEW: CPT

## 2022-06-28 PROCEDURE — 85730 THROMBOPLASTIN TIME PARTIAL: CPT

## 2022-06-28 PROCEDURE — 80053 COMPREHEN METABOLIC PANEL: CPT

## 2022-06-28 PROCEDURE — 74177 CT ABD & PELVIS W/CONTRAST: CPT

## 2022-06-28 PROCEDURE — 82150 ASSAY OF AMYLASE: CPT

## 2022-06-28 PROCEDURE — 99285 EMERGENCY DEPT VISIT HI MDM: CPT

## 2022-06-28 PROCEDURE — 71046 X-RAY EXAM CHEST 2 VIEWS: CPT

## 2022-06-28 PROCEDURE — 84484 ASSAY OF TROPONIN QUANT: CPT

## 2022-06-28 PROCEDURE — 85025 COMPLETE CBC W/AUTO DIFF WBC: CPT

## 2022-06-28 PROCEDURE — 96361 HYDRATE IV INFUSION ADD-ON: CPT

## 2022-06-28 NOTE — XR
EXAMINATION TYPE: XR chest 2V

 

DATE OF EXAM: 6/28/2022

 

COMPARISON: 3/5/2022

 

HISTORY: 42-year-old male abdominal pain

 

TECHNIQUE:  PA and lateral views

 

FINDINGS:  

Patient's left hand projects over the periphery of the left upper lobe. Heart normal size. Air lucenc
ies and surgical material projecting at the left base compatible with known hiatal hernia. Resurfacin
g right shoulder plasty. No addy consolidation or pleural effusion seen.

 

 

IMPRESSION:  

No definite acute process. Known underlying hiatal hernia and postsurgical change of Carola-en-Y gastri
c bypass.

## 2022-06-28 NOTE — ED
General Adult HPI





- General


Chief complaint: Chest Pain


Stated complaint: abd pain


Time Seen by Provider: 22 13:35


Source: patient, EMS, RN notes reviewed


Mode of arrival: EMS


Limitations: no limitations





- History of Present Illness


Initial comments: 





Patient is a pleasant 42-year-old male presenting to the emergency Department 

with abdominal discomfort.  Onset of symptoms was yesterday.  Patient does have 

history of previous removal of half of his stomach surgery in February.  Patient

has had similar symptoms previously.  Patient has nausea and has vomited.  

Patient has some discomfort that radiates up towards the chest.  Patient 

requests pain and nausea medicine.  No fevers.  No constipation or diarrhea.





- Related Data


                                Home Medications











 Medication  Instructions  Recorded  Confirmed


 


traZODone HCL [Desyrel] 200 mg PO HS 22











                                    Allergies











Allergy/AdvReac Type Severity Reaction Status Date / Time


 


hydromorphone [From Dilaudid] Allergy  Rash/Hives Verified 22 14:07


 


latex Allergy  Rash/Hives Verified 22 14:07


 


meperidine [From Demerol] Allergy  Rash/Hives Verified 22 14:07














Review of Systems


ROS Statement: 


Those systems with pertinent positive or pertinent negative responses have been 

documented in the HPI.





ROS Other: All systems not noted in ROS Statement are negative.


Constitutional: Denies: fever


Eyes: Denies: eye pain


ENT: Denies: ear pain


Respiratory: Denies: cough, dyspnea


Cardiovascular: Reports: as per HPI


Endocrine: Denies: fatigue


Gastrointestinal: Reports: as per HPI, abdominal pain, nausea, vomiting


Genitourinary: Denies: dysuria


Musculoskeletal: Denies: back pain


Skin: Denies: rash


Neurological: Denies: weakness





Past Medical History


Past Medical History: GERD/Reflux


Additional Past Medical History / Comment(s): multiple dislocation of rt 

shoulder, hiatal hernia, hx migraines, IBS


History of Any Multi-Drug Resistant Organisms: None Reported


Past Surgical History: Appendectomy, Back Surgery, Cholecystectomy, Orthopedic 

Surgery, Tonsillectomy


Additional Past Surgical History / Comment(s): rt shoulder arthroscopy, left 

shoulder fusion, hiatal hernia surgery. Peg tube


Past Anesthesia/Blood Transfusion Reactions: No Reported Reaction


Past Psychological History: No Psychological Hx Reported


Smoking Status: Never smoker


Past Alcohol Use History: None Reported


Past Drug Use History: None Reported





- Past Family History


  ** Mother


Additional Family Medical History / Comment(s): lupus, 





  ** Father


History Unknown: Yes





  ** Brother(s)


Additional Family Medical History / Comment(s): autoimmune disorder





General Exam


Limitations: no limitations


General appearance: alert


Head exam: Present: normocephalic


Eye exam: Present: normal appearance


Neck exam: Present: normal inspection


Respiratory exam: Present: normal lung sounds bilaterally


Cardiovascular Exam: Present: regular rate, normal rhythm


  ** Expanded


Peripheral pulses: 2+: Radial (R), Radial (L), Dorsalis Pedis (R), Dorsalis 

Pedis (L)


GI/Abdominal exam: Present: soft, tenderness (Mild epigastric tenderness to 

palpation).  Absent: distended


Extremities exam: Present: normal inspection


Neurological exam: Present: alert


Psychiatric exam: Present: normal affect, normal mood


Skin exam: Present: normal color





Course


                                   Vital Signs











  22





  14:05 15:57


 


Temperature 98.9 F 


 


Pulse Rate 64 64


 


Respiratory 20 18





Rate  


 


Blood Pressure 117/87 137/76


 


O2 Sat by Pulse 97 99





Oximetry  














EKG Findings





- EKG Comments:


EKG Findings:: Sinus bradycardia with a rate of 58.  .  QRS 85.  .  

QTc 414.  Normal axis.  Normal QRS.  No acute ST change.





Medical Decision Making





- Medical Decision Making





Case was discussed with Dr. Pardo covering with Dr. Marvin.  He is familiar with 

this patient from multiple previous visits.  He feels he is unable to help with 

this patient as a patient has refused to see him previously.


Patient reevaluated and resting comfortably in bed.  Patient has continued 

symptoms.  Prior records reviews.  Patient now admits that he actually has had 

similar symptoms multiple times previously.  Patient requests further 

medication.  Patient will be provided or antiemetic prior to discharge.





- Lab Data


Result diagrams: 


                                 22 14:24





                                 22 14:24


                                   Lab Results











  22 Range/Units





  14:24 14:24 14:24 


 


WBC  11.3 H    (3.8-10.6)  k/uL


 


RBC  5.10    (4.30-5.90)  m/uL


 


Hgb  14.7    (13.0-17.5)  gm/dL


 


Hct  45.6    (39.0-53.0)  %


 


MCV  89.3    (80.0-100.0)  fL


 


MCH  28.8    (25.0-35.0)  pg


 


MCHC  32.2    (31.0-37.0)  g/dL


 


RDW  16.6 H    (11.5-15.5)  %


 


Plt Count  293    (150-450)  k/uL


 


MPV  8.0    


 


Neutrophils %  88    %


 


Lymphocytes %  8    %


 


Monocytes %  3    %


 


Eosinophils %  0    %


 


Basophils %  0    %


 


Neutrophils #  10.0 H    (1.3-7.7)  k/uL


 


Lymphocytes #  0.9 L    (1.0-4.8)  k/uL


 


Monocytes #  0.4    (0-1.0)  k/uL


 


Eosinophils #  0.0    (0-0.7)  k/uL


 


Basophils #  0.0    (0-0.2)  k/uL


 


Anisocytosis  Slight    


 


PT   11.0   (9.0-12.0)  sec


 


INR   1.0   (<1.2)  


 


APTT   23.4   (22.0-30.0)  sec


 


D-Dimer   0.48   (<0.60)  mg/L FEU


 


Sodium    142  (137-145)  mmol/L


 


Potassium    3.5  (3.5-5.1)  mmol/L


 


Chloride    106  ()  mmol/L


 


Carbon Dioxide    21 L  (22-30)  mmol/L


 


Anion Gap    15  mmol/L


 


BUN    16  (9-20)  mg/dL


 


Creatinine    0.82  (0.66-1.25)  mg/dL


 


Est GFR (CKD-EPI)AfAm    >90  (>60 ml/min/1.73 sqM)  


 


Est GFR (CKD-EPI)NonAf    >90  (>60 ml/min/1.73 sqM)  


 


Glucose    116 H  (74-99)  mg/dL


 


Calcium    9.7  (8.4-10.2)  mg/dL


 


Total Bilirubin    0.6  (0.2-1.3)  mg/dL


 


AST    121 H  (17-59)  U/L


 


ALT    234 H  (4-49)  U/L


 


Alkaline Phosphatase    215 H  ()  U/L


 


Troponin I     (0.000-0.034)  ng/mL


 


Total Protein    7.8  (6.3-8.2)  g/dL


 


Albumin    4.8  (3.5-5.0)  g/dL


 


Amylase    156 H  ()  U/L


 


Lipase    135  ()  U/L














  22 Range/Units





  14:24 


 


WBC   (3.8-10.6)  k/uL


 


RBC   (4.30-5.90)  m/uL


 


Hgb   (13.0-17.5)  gm/dL


 


Hct   (39.0-53.0)  %


 


MCV   (80.0-100.0)  fL


 


MCH   (25.0-35.0)  pg


 


MCHC   (31.0-37.0)  g/dL


 


RDW   (11.5-15.5)  %


 


Plt Count   (150-450)  k/uL


 


MPV   


 


Neutrophils %   %


 


Lymphocytes %   %


 


Monocytes %   %


 


Eosinophils %   %


 


Basophils %   %


 


Neutrophils #   (1.3-7.7)  k/uL


 


Lymphocytes #   (1.0-4.8)  k/uL


 


Monocytes #   (0-1.0)  k/uL


 


Eosinophils #   (0-0.7)  k/uL


 


Basophils #   (0-0.2)  k/uL


 


Anisocytosis   


 


PT   (9.0-12.0)  sec


 


INR   (<1.2)  


 


APTT   (22.0-30.0)  sec


 


D-Dimer   (<0.60)  mg/L FEU


 


Sodium   (137-145)  mmol/L


 


Potassium   (3.5-5.1)  mmol/L


 


Chloride   ()  mmol/L


 


Carbon Dioxide   (22-30)  mmol/L


 


Anion Gap   mmol/L


 


BUN   (9-20)  mg/dL


 


Creatinine   (0.66-1.25)  mg/dL


 


Est GFR (CKD-EPI)AfAm   (>60 ml/min/1.73 sqM)  


 


Est GFR (CKD-EPI)NonAf   (>60 ml/min/1.73 sqM)  


 


Glucose   (74-99)  mg/dL


 


Calcium   (8.4-10.2)  mg/dL


 


Total Bilirubin   (0.2-1.3)  mg/dL


 


AST   (17-59)  U/L


 


ALT   (4-49)  U/L


 


Alkaline Phosphatase   ()  U/L


 


Troponin I  <0.012  (0.000-0.034)  ng/mL


 


Total Protein   (6.3-8.2)  g/dL


 


Albumin   (3.5-5.0)  g/dL


 


Amylase   ()  U/L


 


Lipase   ()  U/L














- Radiology Data


Radiology results: report reviewed (CT of abdomen and pelvis does not reveal 

acute abnormality.)





Disposition


Clinical Impression: 


 Vomiting, Abdominal pain





Disposition: HOME SELF-CARE


Condition: Stable


Instructions (If sedation given, give patient instructions):  Abdominal Pain 

(ED), Acute Nausea and Vomiting (ED)


Additional Instructions: 


Please follow-up with primary care physician in the next day or 2 for recheck.  

Please also follow-up with your surgeon in the next day or 2 for recheck.  

Return for uncontrolled vomiting, fever, worsening pain, not telling fluids, 

worsening symptoms or other concerns.


Is patient prescribed a controlled substance at d/c from ED?: No


Referrals: 


Tyra Marvin MD [Primary Care Provider] - 1-2 days


Time of Disposition: 18:07

## 2022-06-28 NOTE — CT
EXAMINATION TYPE: CT abdomen pelvis w con

 

DATE OF EXAM: 6/28/2022

 

COMPARISON: 6/26/2022

 

HISTORY: abdominal pain and vomiting

 

CT DLP: 1485.9 mGycm

Automated exposure control for dose reduction was used.

 

CONTRAST: 

Performed with IV Contrast, patient injected with 100 mL of Isovue 300.

 

Images obtained from the diaphragm to the floor of the pelvis with no contrast.

 

There is some mild atelectasis at the left lung base. Unchanged. There is hiatal hernia. There is pre
vious gastric surgery. Stomach is intact. There is fatty infiltration of the liver. The bile ducts ar
e not dilated. Gallbladder appears absent. Spleen is intact. No pancreatic mass.

 

There is no adrenal mass. There is linear density in the left upper quadrant from previous gastrostom
y tube. Kidneys show satisfactory contrast opacification. There is no hydronephrosis. Bladder distend
s smoothly. No pelvic mass. No inguinal hernia. No free fluid in the pelvis.

There is no mesenteric edema. No ascites. No evidence of free air in the abdomen.

The lumbar vertebra show normal alignment. There is posterior fusion surgery at L5-S1. No compression
 fracture. Bony pelvis is intact. Hip joints are intact. Appendix not clearly seen. No sign of thicke
edmond appendix.

 

IMPRESSION:

No acute abnormality within the abdomen pelvis. No significant change compared to old exam.

## 2022-06-28 NOTE — XR
EXAMINATION TYPE: XR KUB

 

DATE OF EXAM: 6/28/2022

 

Comparison: 5/8/2022 and CT 6/26/2022

 

Clinical History: 42-year-old male abdominal pain

 

Findings:

Chest reported separately.

 

No dilated small bowel. Supine imaging limited for assessment of free air. Scattered colonic air exte
nding distally to the rectum. Mild overall snowboarding. No suspicious calcifications. Surgical mater
ial at the GE junction. Some of the surgical material is located at the left base suggesting an under
lying hiatal hernia. Post surgical change of L5-S1 posterior and interbody fusion.

 

 

Impression:

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 Some surgical material proje
cts at the left base compatible with known hiatal hernia. Nonobstructive bowel gas pattern. Mild over
all stool burden.

## 2022-08-04 ENCOUNTER — HOSPITAL ENCOUNTER (OUTPATIENT)
Dept: HOSPITAL 47 - EC | Age: 42
Setting detail: OBSERVATION
LOS: 2 days | Discharge: HOME | End: 2022-08-06
Attending: HOSPITALIST | Admitting: HOSPITALIST
Payer: COMMERCIAL

## 2022-08-04 VITALS — BODY MASS INDEX: 27.5 KG/M2

## 2022-08-04 DIAGNOSIS — K58.9: ICD-10-CM

## 2022-08-04 DIAGNOSIS — Z96.611: ICD-10-CM

## 2022-08-04 DIAGNOSIS — Z91.040: ICD-10-CM

## 2022-08-04 DIAGNOSIS — K44.9: ICD-10-CM

## 2022-08-04 DIAGNOSIS — M54.50: ICD-10-CM

## 2022-08-04 DIAGNOSIS — R00.0: ICD-10-CM

## 2022-08-04 DIAGNOSIS — Z98.890: ICD-10-CM

## 2022-08-04 DIAGNOSIS — R42: ICD-10-CM

## 2022-08-04 DIAGNOSIS — J98.11: ICD-10-CM

## 2022-08-04 DIAGNOSIS — K21.9: ICD-10-CM

## 2022-08-04 DIAGNOSIS — Z98.1: ICD-10-CM

## 2022-08-04 DIAGNOSIS — Z88.5: ICD-10-CM

## 2022-08-04 DIAGNOSIS — M25.512: ICD-10-CM

## 2022-08-04 DIAGNOSIS — G43.909: ICD-10-CM

## 2022-08-04 DIAGNOSIS — Z90.49: ICD-10-CM

## 2022-08-04 DIAGNOSIS — I45.10: ICD-10-CM

## 2022-08-04 DIAGNOSIS — G89.29: ICD-10-CM

## 2022-08-04 DIAGNOSIS — Z79.899: ICD-10-CM

## 2022-08-04 DIAGNOSIS — R10.9: ICD-10-CM

## 2022-08-04 DIAGNOSIS — R11.2: ICD-10-CM

## 2022-08-04 DIAGNOSIS — Z83.2: ICD-10-CM

## 2022-08-04 DIAGNOSIS — R07.89: Primary | ICD-10-CM

## 2022-08-04 LAB
ALBUMIN SERPL-MCNC: 3.7 G/DL (ref 3.5–5)
ALP SERPL-CCNC: 101 U/L (ref 38–126)
ALT SERPL-CCNC: 16 U/L (ref 4–49)
ANION GAP SERPL CALC-SCNC: 3 MMOL/L
APTT BLD: 22.6 SEC (ref 22–30)
AST SERPL-CCNC: 25 U/L (ref 17–59)
BASOPHILS # BLD AUTO: 0 K/UL (ref 0–0.2)
BASOPHILS NFR BLD AUTO: 0 %
BUN SERPL-SCNC: 12 MG/DL (ref 9–20)
CALCIUM SPEC-MCNC: 9.2 MG/DL (ref 8.4–10.2)
CHLORIDE SERPL-SCNC: 108 MMOL/L (ref 98–107)
CO2 SERPL-SCNC: 28 MMOL/L (ref 22–30)
EOSINOPHIL # BLD AUTO: 0.1 K/UL (ref 0–0.7)
EOSINOPHIL NFR BLD AUTO: 1 %
ERYTHROCYTE [DISTWIDTH] IN BLOOD BY AUTOMATED COUNT: 4.75 M/UL (ref 4.3–5.9)
ERYTHROCYTE [DISTWIDTH] IN BLOOD: 14.9 % (ref 11.5–15.5)
GLUCOSE SERPL-MCNC: 86 MG/DL (ref 74–99)
HCT VFR BLD AUTO: 42.8 % (ref 39–53)
HGB BLD-MCNC: 13.9 GM/DL (ref 13–17.5)
INR PPP: 1 (ref ?–1.2)
LIPASE SERPL-CCNC: 68 U/L (ref 23–300)
LYMPHOCYTES # SPEC AUTO: 1 K/UL (ref 1–4.8)
LYMPHOCYTES NFR SPEC AUTO: 28 %
MAGNESIUM SPEC-SCNC: 2.1 MG/DL (ref 1.6–2.3)
MCH RBC QN AUTO: 29.3 PG (ref 25–35)
MCHC RBC AUTO-ENTMCNC: 32.5 G/DL (ref 31–37)
MCV RBC AUTO: 90.1 FL (ref 80–100)
MONOCYTES # BLD AUTO: 0.2 K/UL (ref 0–1)
MONOCYTES NFR BLD AUTO: 4 %
NEUTROPHILS # BLD AUTO: 2.4 K/UL (ref 1.3–7.7)
NEUTROPHILS NFR BLD AUTO: 64 %
PLATELET # BLD AUTO: 251 K/UL (ref 150–450)
POTASSIUM SERPL-SCNC: 4.2 MMOL/L (ref 3.5–5.1)
PROT SERPL-MCNC: 6.4 G/DL (ref 6.3–8.2)
PT BLD: 11.2 SEC (ref 9–12)
SODIUM SERPL-SCNC: 139 MMOL/L (ref 137–145)
WBC # BLD AUTO: 3.7 K/UL (ref 3.8–10.6)

## 2022-08-04 PROCEDURE — 96375 TX/PRO/DX INJ NEW DRUG ADDON: CPT

## 2022-08-04 PROCEDURE — 83735 ASSAY OF MAGNESIUM: CPT

## 2022-08-04 PROCEDURE — 93306 TTE W/DOPPLER COMPLETE: CPT

## 2022-08-04 PROCEDURE — 83690 ASSAY OF LIPASE: CPT

## 2022-08-04 PROCEDURE — 74176 CT ABD & PELVIS W/O CONTRAST: CPT

## 2022-08-04 PROCEDURE — 36415 COLL VENOUS BLD VENIPUNCTURE: CPT

## 2022-08-04 PROCEDURE — 84478 ASSAY OF TRIGLYCERIDES: CPT

## 2022-08-04 PROCEDURE — 96374 THER/PROPH/DIAG INJ IV PUSH: CPT

## 2022-08-04 PROCEDURE — 85025 COMPLETE CBC W/AUTO DIFF WBC: CPT

## 2022-08-04 PROCEDURE — 85610 PROTHROMBIN TIME: CPT

## 2022-08-04 PROCEDURE — 80053 COMPREHEN METABOLIC PANEL: CPT

## 2022-08-04 PROCEDURE — 96361 HYDRATE IV INFUSION ADD-ON: CPT

## 2022-08-04 PROCEDURE — 82465 ASSAY BLD/SERUM CHOLESTEROL: CPT

## 2022-08-04 PROCEDURE — 80306 DRUG TEST PRSMV INSTRMNT: CPT

## 2022-08-04 PROCEDURE — 94760 N-INVAS EAR/PLS OXIMETRY 1: CPT

## 2022-08-04 PROCEDURE — 85730 THROMBOPLASTIN TIME PARTIAL: CPT

## 2022-08-04 PROCEDURE — 71046 X-RAY EXAM CHEST 2 VIEWS: CPT

## 2022-08-04 PROCEDURE — 96376 TX/PRO/DX INJ SAME DRUG ADON: CPT

## 2022-08-04 PROCEDURE — 83718 ASSAY OF LIPOPROTEIN: CPT

## 2022-08-04 PROCEDURE — 84484 ASSAY OF TROPONIN QUANT: CPT

## 2022-08-04 PROCEDURE — 99285 EMERGENCY DEPT VISIT HI MDM: CPT

## 2022-08-04 PROCEDURE — 80048 BASIC METABOLIC PNL TOTAL CA: CPT

## 2022-08-04 PROCEDURE — 93005 ELECTROCARDIOGRAM TRACING: CPT

## 2022-08-04 RX ADMIN — ONDANSETRON PRN MG: 2 INJECTION INTRAMUSCULAR; INTRAVENOUS at 19:52

## 2022-08-04 RX ADMIN — METOPROLOL TARTRATE SCH: 25 TABLET, FILM COATED ORAL at 19:53

## 2022-08-04 NOTE — XR
EXAMINATION TYPE: XR chest 2V

 

DATE OF EXAM: 8/4/2022 11:15 AM

 

COMPARISON: Chest radiographs from 6/28/2022

 

TECHNIQUE: XR chest 2V Frontal and lateral views of the chest.

 

CLINICAL INDICATION:Male, 42 years old with history of Chest Pain; 

 

FINDINGS: 

Lungs/Pleura: There is no evidence of pleural effusion, focal consolidation, or pneumothorax.  

Pulmonary vascularity: Unremarkable.

Heart/mediastinum: Cardiomediastinal silhouette is unremarkable.

Musculoskeletal: No acute osseous pathology. Right shoulder arthroplasty changes

 

IMPRESSION: 

No acute cardiopulmonary disease/process, no significant change from prior

## 2022-08-04 NOTE — ED
Chest Pain HPI





- General


Chief Complaint: Chest Pain


Stated Complaint: chest pain


Time Seen by Provider: 22 09:38


Source: patient, RN notes reviewed


Mode of arrival: ambulatory


Limitations: no limitations





- History of Present Illness


Initial Comments: 


42-year-old male presents emergency Department with tumor chest pain.  Patient 

states that he has a monitor on right now and states that he did receive 

multiple phone calls with the night stating that he is in A. fib that he was 

having multiple episodes of tachycardia in need to present emergency department.

 Patient deteriorated since morning present.  He still complaint left-sided 

chest pain, left shoulder pain.  Patient denies any significant prior headache 

historypresents no history of hypertension hyperlipidemia.








- Related Data


                                Home Medications











 Medication  Instructions  Recorded  Confirmed


 


traZODone HCL [Desyrel] 200 mg PO HS 22











                                    Allergies











Allergy/AdvReac Type Severity Reaction Status Date / Time


 


hydromorphone [From Dilaudid] Allergy  Rash/Hives Verified 22 09:36


 


latex Allergy  Rash/Hives Verified 22 09:36


 


meperidine [From Demerol] Allergy  Rash/Hives Verified 22 09:36














Review of Systems


ROS Statement: 


Those systems with pertinent positive or pertinent negative responses have been 

documented in the HPI.





ROS Other: All systems not noted in ROS Statement are negative.





EKG Findings





- EKG Comments:


EKG Findings:: EKG performed at 9:40 sinus rhythm rate of 77  QRS 92 QT 

status /397





Past Medical History


Past Medical History: GERD/Reflux


Additional Past Medical History / Comment(s): multiple dislocation of rt 

shoulder, hiatal hernia, hx migraines, IBS


History of Any Multi-Drug Resistant Organisms: None Reported


Past Surgical History: Appendectomy, Back Surgery, Cholecystectomy, Orthopedic 

Surgery, Tonsillectomy


Additional Past Surgical History / Comment(s): rt shoulder arthroscopy, left 

shoulder fusion, hiatal hernia surgery. Peg tube


Past Anesthesia/Blood Transfusion Reactions: No Reported Reaction


Past Psychological History: No Psychological Hx Reported


Smoking Status: Never smoker


Past Alcohol Use History: None Reported


Past Drug Use History: None Reported





- Past Family History


  ** Mother


Additional Family Medical History / Comment(s): lupus, 





  ** Father


History Unknown: Yes





  ** Brother(s)


Additional Family Medical History / Comment(s): autoimmune disorder





General Exam


Limitations: no limitations


General appearance: alert, in no apparent distress


Head exam: Present: atraumatic, normocephalic, normal inspection


Eye exam: Present: normal appearance, PERRL, EOMI.  Absent: scleral icterus, 

conjunctival injection, periorbital swelling


ENT exam: Present: normal exam, normal oropharynx, mucous membranes moist


Neck exam: Present: normal inspection.  Absent: tenderness, meningismus, 

lymphadenopathy


Respiratory exam: Present: normal lung sounds bilaterally.  Absent: respiratory 

distress, wheezes, rales, rhonchi, stridor


Cardiovascular Exam: Present: regular rate, normal rhythm, normal heart sounds. 

Absent: systolic murmur, diastolic murmur, rubs, gallop, clicks


GI/Abdominal exam: Present: soft, normal bowel sounds.  Absent: distended, 

tenderness, guarding, rebound, rigid





Course


                                   Vital Signs











  22





  09:33 09:50


 


Temperature 97.7 F 


 


Pulse Rate 89 


 


Pulse Rate [  87





Cardiac Monitor  





]  


 


Respiratory 18 





Rate  


 


Blood Pressure 128/82 


 


O2 Sat by Pulse 99 





Oximetry  














Chest Pain MDM





- MDM


42-year-old presented for possible arrhythmia chest pain.  Patient admitted for 

cardiac rule out initial workup was negative.








Disposition


Clinical Impression: 


 Chest pain





Disposition: ADMITTED AS IP TO THIS HOSP


Referrals: 


Tyra Marvin MD [Primary Care Provider] - 1-2 days


Time of Disposition: 12:03

## 2022-08-05 LAB
ANION GAP SERPL CALC-SCNC: 10 MMOL/L
BASOPHILS # BLD AUTO: 0 K/UL (ref 0–0.2)
BASOPHILS NFR BLD AUTO: 1 %
BUN SERPL-SCNC: 13 MG/DL (ref 9–20)
CALCIUM SPEC-MCNC: 9.1 MG/DL (ref 8.4–10.2)
CHLORIDE SERPL-SCNC: 109 MMOL/L (ref 98–107)
CHOLEST SERPL-MCNC: 139 MG/DL (ref 0–200)
CO2 SERPL-SCNC: 19 MMOL/L (ref 22–30)
EOSINOPHIL # BLD AUTO: 0.1 K/UL (ref 0–0.7)
EOSINOPHIL NFR BLD AUTO: 1 %
ERYTHROCYTE [DISTWIDTH] IN BLOOD BY AUTOMATED COUNT: 4.58 M/UL (ref 4.3–5.9)
ERYTHROCYTE [DISTWIDTH] IN BLOOD: 14.7 % (ref 11.5–15.5)
GLUCOSE SERPL-MCNC: 99 MG/DL (ref 74–99)
HCT VFR BLD AUTO: 40.7 % (ref 39–53)
HDLC SERPL-MCNC: 35.2 MG/DL (ref 40–60)
HGB BLD-MCNC: 13.7 GM/DL (ref 13–17.5)
LYMPHOCYTES # SPEC AUTO: 1.1 K/UL (ref 1–4.8)
LYMPHOCYTES NFR SPEC AUTO: 22 %
MCH RBC QN AUTO: 29.9 PG (ref 25–35)
MCHC RBC AUTO-ENTMCNC: 33.6 G/DL (ref 31–37)
MCV RBC AUTO: 88.9 FL (ref 80–100)
MONOCYTES # BLD AUTO: 0.2 K/UL (ref 0–1)
MONOCYTES NFR BLD AUTO: 5 %
NEUTROPHILS # BLD AUTO: 3.3 K/UL (ref 1.3–7.7)
NEUTROPHILS NFR BLD AUTO: 70 %
PLATELET # BLD AUTO: 259 K/UL (ref 150–450)
POTASSIUM SERPL-SCNC: 4.1 MMOL/L (ref 3.5–5.1)
SODIUM SERPL-SCNC: 138 MMOL/L (ref 137–145)
TRIGL SERPL-MCNC: 95.9 MG/DL (ref 0–149)
WBC # BLD AUTO: 4.8 K/UL (ref 3.8–10.6)

## 2022-08-05 RX ADMIN — PROCHLORPERAZINE EDISYLATE PRN MG: 5 INJECTION INTRAMUSCULAR; INTRAVENOUS at 16:58

## 2022-08-05 RX ADMIN — CEFAZOLIN SCH MLS/HR: 330 INJECTION, POWDER, FOR SOLUTION INTRAMUSCULAR; INTRAVENOUS at 09:37

## 2022-08-05 RX ADMIN — METOPROLOL TARTRATE SCH: 25 TABLET, FILM COATED ORAL at 09:32

## 2022-08-05 RX ADMIN — ONDANSETRON PRN MG: 2 INJECTION INTRAMUSCULAR; INTRAVENOUS at 06:33

## 2022-08-05 RX ADMIN — PROCHLORPERAZINE EDISYLATE PRN MG: 5 INJECTION INTRAMUSCULAR; INTRAVENOUS at 23:32

## 2022-08-05 RX ADMIN — HYDROMORPHONE HYDROCHLORIDE PRN MG: 1 INJECTION, SOLUTION INTRAMUSCULAR; INTRAVENOUS; SUBCUTANEOUS at 16:59

## 2022-08-05 RX ADMIN — CEFAZOLIN SCH MLS/HR: 330 INJECTION, POWDER, FOR SOLUTION INTRAMUSCULAR; INTRAVENOUS at 23:31

## 2022-08-05 RX ADMIN — DIPHENHYDRAMINE HYDROCHLORIDE PRN MG: 50 INJECTION, SOLUTION INTRAMUSCULAR; INTRAVENOUS at 20:07

## 2022-08-05 RX ADMIN — ONDANSETRON PRN MG: 2 INJECTION INTRAMUSCULAR; INTRAVENOUS at 01:45

## 2022-08-05 RX ADMIN — METOPROLOL TARTRATE SCH MG: 25 TABLET, FILM COATED ORAL at 20:07

## 2022-08-05 RX ADMIN — HYDROMORPHONE HYDROCHLORIDE PRN MG: 1 INJECTION, SOLUTION INTRAMUSCULAR; INTRAVENOUS; SUBCUTANEOUS at 21:22

## 2022-08-05 NOTE — CA
Transthoracic Echo Report 

 Name: Gunner Hooks 

 MRN:    [id number] 

 Age:    42     Gender:     M 

 

 :    1980 

 Exam Date:     2022 13:28 

 Exam Location: [facility] 

 Ht (in):     68     Wt (lb):     181 

 Ordering Physician:        Satay Contreras PAC 

 Attending/Referring Phys:         , Diane 

 Technician         Mely Pruitt, LISHA 

 Procedure CPT: 

 Indications:       Chest Pain 

 

 Cardiac Hx: 

 Technical Quality:      Good 

 Contrast 1:                                Total Dose (mL): 

 Contrast 2:                                Total Dose (mL): 

 

 MEASUREMENTS  (Male / Female) Normal Values 

 2D ECHO 

 LV Diastolic Diameter PLAX        4.4 cm                4.2 - 5.9 / 3.9 - 5.3 cm 

 LV Systolic Diameter PLAX         2.4 cm                 

 IVS Diastolic Thickness           1.0 cm                0.6 - 1.0 / 0.6 - 0.9 cm 

 LVPW Diastolic Thickness          0.9 cm                0.6 - 1.0 / 0.6 - 0.9 cm 

 LV Relative Wall Thickness        0.4                    

 RV Internal Dim ED PLAX           3.3 cm                 

 LA Systolic Diameter LX           3.5 cm                3.0 - 4.0 / 2.7 - 3.8 cm 

 LA Volume                         41.2 cm???              18 - 58 / 22 - 52 cm??? 

 

 M-MODE 

 Aortic Root Diameter MM           2.9 cm                 

 MV E Point Septal Separation      0.6 cm                 

 AV Cusp Separation MM             2.1 cm                 

 

 DOPPLER 

 AV Peak Velocity                  135.1 cm/s             

 AV Peak Gradient                  7.3 mmHg               

 MV Area PHT                       3.5 cm???                

 Mitral E Point Velocity           95.5 cm/s              

 Mitral A Point Velocity           82.0 cm/s              

 Mitral E to A Ratio               1.2                    

 MV Deceleration Time              218.3 ms               

 MV E' Velocity                    8.2 cm/s               

 Mitral E to MV E' Ratio           11.7                   

 

 

 FINDINGS 

 Left Ventricle 

 Left ventricular ejection fraction is estimated at 60-65 %. Left ventricular  

 cavity size normal. Left ventricular wall thickness normal. 

 

 Right Ventricle 

 Mild right ventricular dilatation. No TR,  Unable to estimate the right  

 ventricular systolic pressure. 

 

 Right Atrium 

 Normal right atrial size. 

 

 Left Atrium 

 Normal left atrial size. No evidence for an atrial septal defect. 

 

 Mitral Valve 

 Structurally normal mitral valve. Trace mitral regurgitation. 

 

 Aortic Valve 

 Trileaflet aortic valve. No aortic valve stenosis or regurgitation. 

 

 Tricuspid Valve 

 Structurally normal tricuspid valve. 

 

 Pulmonic Valve 

 Trace pulmonic regurgitation. 

 

 Pericardium 

 Normal pericardium. No pericardial effusion. 

 

 Aorta 

 Normal size aortic root and proximal ascending aorta. 

 

 CONCLUSIONS 

 Normal LV systolic function 

 Mildly dilated right ventricle 

 Normal IVC collapse 

 No tricuspid regurgitation 

 Previewed by:  

 Dr. Saul Raygoza MD 

 (Electronically Signed) 

 Final Date:      2022 09:42

## 2022-08-05 NOTE — CT
EXAMINATION TYPE: CT abdomen pelvis wo con

 

DATE OF EXAM: 8/5/2022

 

COMPARISON: 6/28/2022

 

HISTORY: Intractable vomiting/abd pain

 

CT DLP: 676.0 mGycm

Automated exposure control for dose reduction was used.

 

Lung bases are clear. There is left posterior rib deformities. There is previous gastric surgery. The
re is hiatal hernia. Heart size is normal. No pericardial effusion. Liver is intact. No evidence of a
 splenic mass. There is no pancreatic mass. Gallbladder appears absent. The bile ducts are not dilate
d.

 

There is no adrenal mass. Kidneys have normal size. No hydronephrosis. There is no retroperitoneal ad
enopathy. Ureters are not dilated. Bladder distends smoothly. There is no inguinal hernia. No free fl
uid in the pelvis. No pelvic mass. Appendix is not seen.

 

There is no mesenteric edema. There is no ascites or free air. No bowel obstruction. There is some co
ntrast material in the large bowel. There is posterior fusion surgery at L5-S1. No lumbar compression
 fracture. Bony pelvis is intact. Hip joints are intact. Sacroiliac joints are intact.

 

IMPRESSION:

Subsegmental atelectasis at the left lung base. Hiatal hernia. No acute abnormality in the abdomen an
d pelvis. Previous surgery. No significant change.

## 2022-08-05 NOTE — HP
HISTORY AND PHYSICAL



CHIEF COMPLAINT:

Chest pain.



HISTORY OF PRESENT ILLNESS:

This is a 42-year-old gentleman, with a past medical history of GERD, multiple medical

problems, also being followed by Dr. Marvin in the outpatient setting.  The patient was

having atrial fibrillation with fast ventricular rate overnight.  The patient complains

of chest pain, which was felt in the anterior part of chest.  Because of severe

symptoms, the patient came to [facility] and was admitted for evaluation and

treatment.  There is no history of fever, rigors, or chills at this time.  Troponins

are negative.  The admission EKG shows incomplete right bundle branch block.  The

patient is currently in normal sinus rhythm at this time.



PAST MEDICAL HISTORY:

Reviewed and include, GERD, multiple _____ shoulder.



HOME MEDICATIONS:

Reviewed and include, trazodone.  Dose and rest of medications reviewed.



ALLERGIES:

Dilaudid.



FAMILY HISTORY:

History of lupus.



SOCIAL HISTORY:

No history of smoking or alcohol intake.



REVIEW OF SYSTEMS:

A 14-point review of systems is negative as mentioned earlier.



PHYSICAL EXAMINATION:

VITAL SIGNS:  Pulse 85, blood pressure 90/60, respirations 20.

HEENT:  Conjunctivae normal.

NECK:  No jugular venous distention.

CARDIOVASCULAR:  S1, S2, normal, regular.

CHEST:  Clear to auscultation.

ABDOMEN:  Soft.

NERVOUS SYSTEM:  No focal deficits.

JOINTS:  No active deforming arthropathy.

LYMPHATICS:  No lymph nodes.

SKIN:  No ulcer or bleeding.



LABORATORY DATA:

CBC, CMP, noted and normal.



ASSESSMENT:

1. Paroxysmal atrial fibrillation.

2. Chest pain for evaluation to rule out coronary artery disease.

3. History of gastroesophageal reflux disease.

4. Multiple medical issues.



RECOMMENDATIONS AND DISCUSSION:

This is a 42-year-old gentleman who presented with multiple complex medical issues.  We

will monitor the patient closely.  Resume the home medications, and Cardiology

consultation and telemetry.  Prognosis guarded.  Further recommendations to follow.

See orders for details.





MMODL / IJN: [id number] / Job#: [id number]

## 2022-08-05 NOTE — P.PN
Progress Note - Text





Diagnosis


Chest pain


Dizziness





Patient presented to the emergency room stating that someone called him to go to

the hospital for tachycardia and atrial fibrillation


When I interviewed and examined him he was nauseous and throwing up.  He had a 

basin that contained vomitus


Nitroglycerin was discontinued





Twelve-lead EKG shows sinus mechanism 77 beats a minute





Event monitor from our office was reviewed


Patient claims that he had atrial fibrillation and was called and told he had 

atrial fibrillation and he needed to go to the hospital


Vitals stable blood pressure 128/82 mmHg


Pulse rate 89 beats a minute





Twelve-lead EKG shows sinus rhythm normal NH incomplete right bundle branch 

block normal ST segments


Second EKG shows sinus mechanism 89 beats a minute incomplete right bundle 

branch block pattern nonspecific ST-T changes





2-D echo shows normal LV systolic function with a mildly enlarged right 

ventricle





Event monitor shows sinus rhythm with IVCD


Sinus tachycardia


No significant bradycardia


No arrhythmias


Specifically I do not see any atrial fibrillation





The patient is also claimed that in the past someone in [employer] told 

him he had atrial fibrillation





After reviewing the available strips, I explained to the patient that there was 

no evidence for atrial fibrillation on this event monitor





Please see full consultation by this practitioner

## 2022-08-05 NOTE — P.PN
Subjective


Progress Note Date: 08/05/22





 42-year-old male with a past medical history significant for chronic chest 

pain, chronic pain, and hernia repair. Patient follows in the office with Dr. Raygoza. We have been asked to see the patient in consultation for chest pain. 

Patient examined at the bedside.  Patient was recently seen in the outpatient 

setting on 07/27/2022.  At that time he discussed with the cardiology nurse 

practitioner that he was told he had atrial fibrillation previously after a 

surgical procedure.  A 30 day event monitor was placed at that time.  The 

patient states he was called multiple times yesterday by the event monitor 

company telling the patient he was having atrial fibrillation and significant 

tachycardia and he did come to the emergency room.  Telemetry tracings from 

event monitor did not reveal any atrial fibrillation.  Patient did have episodes

of sinus tachycardia with a heart rate in the 140s.  The patient does report he 

has been vomiting at home.  The patient continues to be nauseous this morning 

and states he has been vomiting this morning.





Objective





- Vital Signs


Vital signs: 


                                   Vital Signs











Temp  98.3 F   08/05/22 14:59


 


Pulse  63   08/05/22 14:59


 


Resp  20   08/05/22 14:59


 


BP  127/86   08/05/22 14:59


 


Pulse Ox  98   08/05/22 14:59


 


FiO2      








                                 Intake & Output











 08/04/22 08/05/22 08/05/22





 18:59 06:59 18:59


 


Weight 82.1 kg  82.1 kg


 


Other:   


 


  Voiding Method Toilet Toilet Toilet


 


  # Voids 1 1 0














- Exam











PHYSICAL EXAMINATION: 





GENERAL: The patient is alert and oriented x3, not in any acute distress. Well 

developed, well nourished. 


HEENT: Pupils are round and equally reacting to light. EOMI. No scleral icterus.

No conjunctival pallor. Normocephalic, atraumatic. No pharyngeal erythema. No 

thyromegaly. 


CARDIOVASCULAR: S1 and S2 present. No murmurs, rubs, or gallops. 


PULMONARY: Chest is clear to auscultation, no wheezing or crackles. 


ABDOMEN: Soft, nontender, nondistended, normoactive bowel sounds. No palpable 

organomegaly. 


MUSCULOSKELETAL: No joint swelling or deformity.


EXTREMITIES: No cyanosis, clubbing, or pedal edema. 


NEUROLOGICAL: Gross neurological examination did not reveal any focal deficits. 


SKIN: No rashes. 











- Labs


CBC & Chem 7: 


                                 08/05/22 04:51





                                 08/05/22 04:51


Labs: 


                  Abnormal Lab Results - Last 24 Hours (Table)











  08/04/22 08/05/22 Range/Units





  10:56 04:51 


 


Chloride   109 H  ()  mmol/L


 


Carbon Dioxide   19 L  (22-30)  mmol/L


 


HDL Cholesterol  35.20 L   (40.00-60.00)  mg/dL














Assessment and Plan


Assessment: 





Chest pain, troponin negative x 3


Intractable nausea and vomiting/abdominal pain; we will order stat CT of the 

abdomen; patient has been placed on IV Zofran for nausea and vomiting; we will 

keep patient nothing by mouth; patient requesting IV pain medications for 

abdominal; Dilaudid 0.5 mg every 6 hours when necessary; further recommendations

once CAT scan results are available


Atrial fibrillation, ruled out thus far, no evidence of afib on event monitor


Sinus tachycardia, likely secondary to nausea and vomiting, HR currently 

controlled  


Chronic pain


History of hernia repair





PLAN: 


An acute coronary event has been ruled out


Continue metoprolol and

## 2022-08-05 NOTE — P.CRDCN
History of Present Illness


Consult date: 22


History of present illness: 





HISTORY OF PRESENT ILLNESS:  





This is a 42-year-old male with a past medical history significant for chronic 

chest pain, chronic pain, and hernia repair. Patient follows in the office with 

Dr. Raygoza. We have been asked to see the patient in consultation for chest 

pain. Patient examined at the bedside.  Patient was recently seen in the 

outpatient setting on 2022.  At that time he discussed with the cardiology

nurse practitioner that he was told he had atrial fibrillation previously after 

a surgical procedure.  A 30 day event monitor was placed at that time.  The 

patient states he was called multiple times yesterday by the event monitor 

company telling the patient he was having atrial fibrillation and significant 

tachycardia and he did come to the emergency room.  Telemetry tracings from 

event monitor did not reveal any atrial fibrillation.  Patient did have episodes

of sinus tachycardia with a heart rate in the 140s.  The patient does report he 

has been vomiting at home.  The patient continues to be nauseous this morning 

and states he has been vomiting this morning.





* EKG reveals sinus mechanism with a heart rate of 77.  No signs of acute 

  ischemia 


* Chest xray no acute cardiopulmonary process


* Laboratory data:  WBC 4.8.  Hemoglobin 13.7.  Plavix count 259.  Sodium 138.  

  Potassium 4.1.  BUN 13.  Creatinine 0.75.  Troponin negative 3.


* Current home cardiac medications include metoprolol tartrate 12.5 mg twice a 

  day.


* Echocardiogram completed revealing ejection fraction 60-65%, trace MR, mildly 

  enlarged RV


* Patient underwent stress echocardiogram in 2021 which was negative for 

  ischemia





REVIEW OF SYSTEMS: 


At the time of my exam:


CONSTITUTIONAL: Denies fever or chills.


HEENT: Denies blurred vision, vision changes, or eye pain. Denies hemoptysis 


CARDIOVASCULAR: Denies chest pain. Denies orthopnea. Denies PND. Denies 

palpitations


RESPIRATORY: Denies shortness of breath. 


GASTROINTESTINAL: Denies abdominal pain. Denies nausea or vomiting. 


HEMATOLOGIC: Denies bleeding disorders.


GENITOURINARY:  Denies any blood in urine.


SKIN: Denies pruitis. Denies rash.





PHYSICAL EXAM: 


VITAL SIGNS: Reviewed.


GENERAL: Well-developed in no acute distress. 


HEENT: Head is normocephalic. Pupils are equal, round. Sclerae anicteric. Mucous

membranes of the mouth are moist. Neck supple. No JVD or thyromegaly


LUNGS: Respirations even and unlabored. Lungs essentially clear to auscultation 

bilaterally.


HEART: Regular rate and rhythm.  S1 and S2 heard.


ABDOMEN: Soft. Nondistended. Nontender.


EXTREMITIES: Normal range of motion.  No clubbing or cyanosis.  Peripheral 

pulses intact.  No lower extremity edema


NEUROLOGIC: Awake and alert. Oriented x 3. 





ASSESSMENT: 


Chest pain, troponin negative x 3


Atrial fibrillation, ruled out thus far, no evidence of afib on event monitor


Sinus tachycardia, likely secondary to nausea and vomiting, HR currently 

controlled  


Chronic pain


History of hernia repair





PLAN: 


An acute coronary event has been ruled out


Continue metoprolol and


Patient may be discharged home today from a cardiac standpoint and follow up on 

an outpatient basis


We will sign off. Please reconsult if needed.








Nurse practitioner note has been reviewed by physician. Signing provider agrees 

with the documented findings, assessment, and plan of care. 








Past Medical History


Past Medical History: Atrial Fibrillation, GERD/Reflux


Additional Past Medical History / Comment(s): Pt states difficulty with 

abdominal pain/nausea and vomiting when eating since surgery for hiatal 

hernia/peg tube, afib during [facility] hospitalization for carola en Y/peg tube surgery,

R shoulder dislocations, pt states recent EKG at Dr. Marvin's office showed MI at

some time, chronic low back pain


History of Any Multi-Drug Resistant Organisms: None Reported


Past Surgical History: Appendectomy, Back Surgery, Cholecystectomy, Hernia 

Repair, Orthopedic Surgery, Tonsillectomy


Additional Past Surgical History / Comment(s): Lysis of abdominal adhesions then

transfetted to [facility] for Carola [facility] Y, peg tube insertion, EGD, diaphragmatic hernia 

repair, lumbar fusion, R shoulder arthroscopy, L shoulder fusion.


Past Anesthesia/Blood Transfusion Reactions: No Reported Reaction


Smoking Status: Never smoker





- Past Family History


  ** Mother


Additional Family Medical History / Comment(s): Mother is , she had 

lupus.





  ** Father


History Unknown: Yes





  ** Brother(s)


Additional Family Medical History / Comment(s): autoimmune disorder





Medications and Allergies


                                Home Medications











 Medication  Instructions  Recorded  Confirmed  Type


 


Cyclobenzaprine [Flexeril] 10 mg PO HS PRN 22 History


 


Metoprolol Tartrate [Lopressor] 12.5 mg PO BID 22 History


 


oxyCODONE HCL [OxyIR] 5 mg PO Q6H PRN 22 History


 


traZODone  mg PO HS 22 History








                                    Allergies











Allergy/AdvReac Type Severity Reaction Status Date / Time


 


hydromorphone [From Dilaudid] Allergy  Rash/Hives, Verified 22 12:21





   can take  





   with  





   Benadryl  





   per patient  


 


latex Allergy  Rash/Hives Verified 22 12:21


 


meperidine [From Demerol] Allergy  Rash/Hives Verified 22 12:21














Physical Exam


Vitals: 


                                   Vital Signs











  Temp Pulse Pulse Resp BP BP Pulse Ox


 


 22 08:06        98


 


 22 07:00  98 F   66  18   118/70  98


 


 22 03:16  97.9 F   97  17   133/79  98


 


 22 19:56  98.3 F   50 L  19   135/86  100


 


 22 14:30  97.8 F   50 L  20   139/85  100


 


 22 12:30  98.1 F  85   20  91/63   100








                                Intake and Output











 22





 22:59 06:59 14:59


 


Other:   


 


  Voiding Method Toilet Toilet 


 


  # Voids 1 1 














Results





                                 22 04:51





                                 22 04:51


                                 Cardiac Enzymes











  22 Range/Units





  10:56 10:56 14:52 


 


AST  25    (17-59)  U/L


 


Troponin I   <0.012  <0.012  (0.000-0.034)  ng/mL














  22 Range/Units





  17:26 


 


AST   (17-59)  U/L


 


Troponin I  <0.012  (0.000-0.034)  ng/mL








                                   Coagulation











  22 Range/Units





  10:56 


 


PT  11.2  (9.0-12.0)  sec


 


APTT  22.6  (22.0-30.0)  sec








                                     Lipids











  22 Range/Units





  04:51 


 


Triglycerides  Cancelled  


 


Cholesterol  Cancelled  


 


HDL Cholesterol  Cancelled  


 


Cholesterol/HDL Ratio  Cancelled  








                                       CBC











  22 Range/Units





  04:51 


 


WBC  4.8  (3.8-10.6)  k/uL


 


RBC  4.58  (4.30-5.90)  m/uL


 


Hgb  13.7  (13.0-17.5)  gm/dL


 


Hct  40.7  (39.0-53.0)  %


 


Plt Count  259  (150-450)  k/uL








                          Comprehensive Metabolic Panel











  22 Range/Units





  10:56 04:51 


 


Sodium  139  138  (137-145)  mmol/L


 


Potassium  4.2  4.1  (3.5-5.1)  mmol/L


 


Chloride  108 H  109 H  ()  mmol/L


 


Carbon Dioxide  28  19 L  (22-30)  mmol/L


 


BUN  12  13  (9-20)  mg/dL


 


Creatinine  0.78  0.75  (0.66-1.25)  mg/dL


 


Glucose  86  99  (74-99)  mg/dL


 


Calcium  9.2  9.1  (8.4-10.2)  mg/dL


 


AST  25   (17-59)  U/L


 


ALT  16   (4-49)  U/L


 


Alkaline Phosphatase  101   ()  U/L


 


Total Protein  6.4   (6.3-8.2)  g/dL


 


Albumin  3.7   (3.5-5.0)  g/dL








                               Current Medications











Generic Name Dose Route Start Last Admin





  Trade Name Freq  PRN Reason Stop Dose Admin


 


Acetaminophen  325 mg  22 19:19  22 19:52





  Acetaminophen Tab 325 Mg Tab  PO   325 mg





  Q6HR PRN   Administration





  Fever and/ or Pain  


 


Cyclobenzaprine HCl  10 mg  22 12:46 





  Cyclobenzaprine 10 Mg Tab  PO  





  HS PRN  





  Muscle Spasm  


 


Sodium Chloride  1,000 mls @ 75 mls/hr  22 08:45  22 09:37





  Saline 0.9%  IV   75 mls/hr





  .[id number] TYLER   Administration


 


Metoprolol Tartrate  12.5 mg  22 21:00  22 09:32





  Metoprolol Tartrate 12.5 Mg Tab  PO   Not Given





  BID TYLER  


 


Nitroglycerin  0.4 mg  22 12:03  22 17:06





  Nitroglycerin Sl Tabs 0.4 Mg Tab  SUBLINGUAL   0.4 mg





  Q5M PRN   Administration





  Chest Pain  


 


Ondansetron HCl  4 mg  22 19:20  22 06:33





  Ondansetron 4 Mg/2 Ml Vial  IVP   4 mg





  Q6HR PRN   Administration





  Nausea And Vomiting  


 


Oxycodone HCl  5 mg  22 12:46  22 01:42





  Oxycodone Hcl 5 Mg Tab  PO   5 mg





  Q6H PRN   Administration





  Pain  


 


Trazodone HCl  300 mg  22 21:00  22 19:53





  Trazodone Hcl 100 Mg Tab  PO   300 mg





  HS TYLER   Administration








                                Intake and Output











 22





 22:59 06:59 14:59


 


Other:   


 


  Voiding Method Toilet Toilet 


 


  # Voids 1 1 








                                        





                                 22 04:51 





                                 22 04:51

## 2022-08-06 VITALS — HEART RATE: 113 BPM | RESPIRATION RATE: 18 BRPM | SYSTOLIC BLOOD PRESSURE: 121 MMHG | DIASTOLIC BLOOD PRESSURE: 79 MMHG

## 2022-08-06 VITALS — TEMPERATURE: 98.5 F

## 2022-08-06 LAB
ANION GAP SERPL CALC-SCNC: 12 MMOL/L
BUN SERPL-SCNC: 11 MG/DL (ref 9–20)
CALCIUM SPEC-MCNC: 8.9 MG/DL (ref 8.4–10.2)
CHLORIDE SERPL-SCNC: 110 MMOL/L (ref 98–107)
CO2 SERPL-SCNC: 18 MMOL/L (ref 22–30)
GLUCOSE SERPL-MCNC: 102 MG/DL (ref 74–99)
POTASSIUM SERPL-SCNC: 4.6 MMOL/L (ref 3.5–5.1)
SODIUM SERPL-SCNC: 140 MMOL/L (ref 137–145)

## 2022-08-06 RX ADMIN — HYDROMORPHONE HYDROCHLORIDE PRN MG: 1 INJECTION, SOLUTION INTRAMUSCULAR; INTRAVENOUS; SUBCUTANEOUS at 11:35

## 2022-08-06 RX ADMIN — HYDROMORPHONE HYDROCHLORIDE PRN MG: 1 INJECTION, SOLUTION INTRAMUSCULAR; INTRAVENOUS; SUBCUTANEOUS at 02:00

## 2022-08-06 RX ADMIN — PROCHLORPERAZINE EDISYLATE PRN MG: 5 INJECTION INTRAMUSCULAR; INTRAVENOUS at 05:18

## 2022-08-06 RX ADMIN — DIPHENHYDRAMINE HYDROCHLORIDE PRN MG: 50 INJECTION, SOLUTION INTRAMUSCULAR; INTRAVENOUS at 09:48

## 2022-08-06 RX ADMIN — HYDROMORPHONE HYDROCHLORIDE PRN MG: 1 INJECTION, SOLUTION INTRAMUSCULAR; INTRAVENOUS; SUBCUTANEOUS at 07:05

## 2022-08-06 RX ADMIN — CEFAZOLIN SCH: 330 INJECTION, POWDER, FOR SOLUTION INTRAMUSCULAR; INTRAVENOUS at 11:07

## 2022-08-06 RX ADMIN — METOPROLOL TARTRATE SCH MG: 25 TABLET, FILM COATED ORAL at 07:05

## 2022-08-07 ENCOUNTER — HOSPITAL ENCOUNTER (EMERGENCY)
Dept: HOSPITAL 47 - EC | Age: 42
Discharge: HOME | End: 2022-08-07
Payer: COMMERCIAL

## 2022-08-07 VITALS
HEART RATE: 74 BPM | RESPIRATION RATE: 16 BRPM | TEMPERATURE: 98.3 F | SYSTOLIC BLOOD PRESSURE: 159 MMHG | DIASTOLIC BLOOD PRESSURE: 75 MMHG

## 2022-08-07 DIAGNOSIS — Z91.040: ICD-10-CM

## 2022-08-07 DIAGNOSIS — Z88.5: ICD-10-CM

## 2022-08-07 DIAGNOSIS — K21.9: ICD-10-CM

## 2022-08-07 DIAGNOSIS — R11.2: Primary | ICD-10-CM

## 2022-08-07 DIAGNOSIS — Z79.899: ICD-10-CM

## 2022-08-07 DIAGNOSIS — I48.91: ICD-10-CM

## 2022-08-07 LAB
ALBUMIN SERPL-MCNC: 4.5 G/DL (ref 3.5–5)
ALP SERPL-CCNC: 118 U/L (ref 38–126)
ALT SERPL-CCNC: 14 U/L (ref 4–49)
AMYLASE SERPL-CCNC: 176 U/L (ref 30–110)
ANION GAP SERPL CALC-SCNC: 19 MMOL/L
AST SERPL-CCNC: 22 U/L (ref 17–59)
BASOPHILS # BLD AUTO: 0 K/UL (ref 0–0.2)
BASOPHILS NFR BLD AUTO: 1 %
BUN SERPL-SCNC: 10 MG/DL (ref 9–20)
CALCIUM SPEC-MCNC: 9.4 MG/DL (ref 8.4–10.2)
CHLORIDE SERPL-SCNC: 102 MMOL/L (ref 98–107)
CO2 SERPL-SCNC: 16 MMOL/L (ref 22–30)
EOSINOPHIL # BLD AUTO: 0 K/UL (ref 0–0.7)
EOSINOPHIL NFR BLD AUTO: 1 %
ERYTHROCYTE [DISTWIDTH] IN BLOOD BY AUTOMATED COUNT: 4.88 M/UL (ref 4.3–5.9)
ERYTHROCYTE [DISTWIDTH] IN BLOOD: 14.6 % (ref 11.5–15.5)
GLUCOSE SERPL-MCNC: 88 MG/DL (ref 74–99)
HCT VFR BLD AUTO: 45.7 % (ref 39–53)
HGB BLD-MCNC: 14.5 GM/DL (ref 13–17.5)
LIPASE SERPL-CCNC: 50 U/L (ref 23–300)
LYMPHOCYTES # SPEC AUTO: 0.6 K/UL (ref 1–4.8)
LYMPHOCYTES NFR SPEC AUTO: 10 %
MCH RBC QN AUTO: 29.8 PG (ref 25–35)
MCHC RBC AUTO-ENTMCNC: 31.9 G/DL (ref 31–37)
MCV RBC AUTO: 93.5 FL (ref 80–100)
MONOCYTES # BLD AUTO: 0.3 K/UL (ref 0–1)
MONOCYTES NFR BLD AUTO: 4 %
NEUTROPHILS # BLD AUTO: 5.6 K/UL (ref 1.3–7.7)
NEUTROPHILS NFR BLD AUTO: 85 %
PLATELET # BLD AUTO: 246 K/UL (ref 150–450)
POTASSIUM SERPL-SCNC: 3.9 MMOL/L (ref 3.5–5.1)
PROT SERPL-MCNC: 7.4 G/DL (ref 6.3–8.2)
SODIUM SERPL-SCNC: 137 MMOL/L (ref 137–145)
WBC # BLD AUTO: 6.6 K/UL (ref 3.8–10.6)

## 2022-08-07 PROCEDURE — 83690 ASSAY OF LIPASE: CPT

## 2022-08-07 PROCEDURE — 80053 COMPREHEN METABOLIC PANEL: CPT

## 2022-08-07 PROCEDURE — 74018 RADEX ABDOMEN 1 VIEW: CPT

## 2022-08-07 PROCEDURE — 99284 EMERGENCY DEPT VISIT MOD MDM: CPT

## 2022-08-07 PROCEDURE — 82150 ASSAY OF AMYLASE: CPT

## 2022-08-07 PROCEDURE — 85025 COMPLETE CBC W/AUTO DIFF WBC: CPT

## 2022-08-07 PROCEDURE — 96375 TX/PRO/DX INJ NEW DRUG ADDON: CPT

## 2022-08-07 PROCEDURE — 36415 COLL VENOUS BLD VENIPUNCTURE: CPT

## 2022-08-07 PROCEDURE — 96374 THER/PROPH/DIAG INJ IV PUSH: CPT

## 2022-08-07 NOTE — XR
EXAMINATION TYPE: XR KUB

 

DATE OF EXAM: 8/7/2022

 

COMPARISON: 6/28/2022

 

HISTORY: Abnormal pain

 

TECHNIQUE:

 

FINDINGS: There is no sign of intestinal obstruction or pneumoperitoneum. Fecal pattern is normal. Th
ere is posterior fusion surgery in the lower lumbar spine. Lung bases are clear of consolidation.

 

IMPRESSION: Nonacute abdomen. No change compared to old exam.

## 2022-08-07 NOTE — ED
General Adult HPI





- General


Chief complaint: Nausea/Vomiting/Diarrhea


Stated complaint: vomiting


Time Seen by Provider: 22 18:21


Source: patient, RN notes reviewed


Mode of arrival: wheelchair


Limitations: no limitations





- History of Present Illness


Initial comments: 





Patient is a pleasant 42-year-old male presenting to the emergency department 

with concerns with nausea vomiting.  Patient admits this is a chronic problem 

for him.  Patient states he did have surgery a different facility back in 

February.  Patient has followed up with them as well and he states they are 

unclear how to help him.  Patient specifically requests Dilaudid and Compazine. 

Patient states if seen is available.  Requests Phenergan.  Patient does have 

abdominal discomfort which is chronic and unchanged.  Patient denies chest pain.





- Related Data


                                Home Medications











 Medication  Instructions  Recorded  Confirmed


 


Cyclobenzaprine [Flexeril] 10 mg PO HS PRN 22


 


Metoprolol Tartrate [Lopressor] 12.5 mg PO BID 22


 


oxyCODONE HCL [OxyIR] 5 mg PO Q6H PRN 22


 


traZODone  mg PO HS 22








                                  Previous Rx's











 Medication  Instructions  Recorded


 


Famotidine [Pepcid] 20 mg PO BID #30 tablet 22











                                    Allergies











Allergy/AdvReac Type Severity Reaction Status Date / Time


 


latex Allergy  Rash/Hives Verified 22 19:18


 


meperidine [From Demerol] Allergy  Rash/Hives Verified 22 19:18














Review of Systems


ROS Statement: 


Those systems with pertinent positive or pertinent negative responses have been 

documented in the HPI.





ROS Other: All systems not noted in ROS Statement are negative.


Constitutional: Denies: fever


Eyes: Denies: eye pain


ENT: Denies: ear pain


Respiratory: Denies: cough


Cardiovascular: Denies: chest pain


Endocrine: Denies: fatigue


Gastrointestinal: Reports: as per HPI, abdominal pain, nausea, vomiting


Genitourinary: Denies: dysuria


Musculoskeletal: Denies: back pain


Skin: Denies: rash


Neurological: Denies: weakness





Past Medical History


Past Medical History: Atrial Fibrillation, GERD/Reflux


Additional Past Medical History / Comment(s): Pt states difficulty with 

abdominal pain/nausea and vomiting when eating since surgery for hiatal 

hernia/peg tube, afib during [facility] hospitalization for carola en Y/peg tube surgery,

R shoulder dislocations, pt states recent EKG at Dr. Marvin's office showed MI at

some time, chronic low back pain


History of Any Multi-Drug Resistant Organisms: None Reported


Past Surgical History: Appendectomy, Back Surgery, Cholecystectomy, Hernia 

Repair, Orthopedic Surgery, Tonsillectomy


Additional Past Surgical History / Comment(s): Lysis of abdominal adhesions then

transfetted to [facility] for Carola en Y, peg tube insertion, EGD, diaphragmatic hernia 

repair, lumbar fusion, R shoulder arthroscopy, L shoulder fusion.


Past Anesthesia/Blood Transfusion Reactions: No Reported Reaction


Past Psychological History: No Psychological Hx Reported


Smoking Status: Never smoker


Past Alcohol Use History: None Reported


Past Drug Use History: None Reported





- Past Family History


  ** Mother


Additional Family Medical History / Comment(s): Mother is , she had 

lupus.





  ** Father


History Unknown: Yes





  ** Brother(s)


Additional Family Medical History / Comment(s): autoimmune disorder





General Exam


Limitations: no limitations


General appearance: alert, in no apparent distress


Head exam: Present: normocephalic


Eye exam: Present: normal appearance


ENT exam: Present: normal oropharynx


Neck exam: Present: normal inspection


Respiratory exam: Present: normal lung sounds bilaterally


Cardiovascular Exam: Present: regular rate, normal rhythm


GI/Abdominal exam: Present: soft.  Absent: distended, tenderness, guarding, 

rebound, rigid, pulsatile mass


Extremities exam: Present: normal inspection


Neurological exam: Present: alert


Psychiatric exam: Present: normal affect, normal mood


Skin exam: Present: normal color





Course


                                   Vital Signs











  22





  18:13


 


Temperature 98.3 F


 


Pulse Rate 74


 


Respiratory 16





Rate 


 


Blood Pressure 159/75


 


O2 Sat by Pulse 100





Oximetry 














Medical Decision Making





- Medical Decision Making





Patient reevaluated and resting comfortably in bed.  Patient still has some 

nausea and is receptive to repeat nausea medication.  Patient updated on results

and need for follow-up.  Patient requests prescription for Pepcid.





- Lab Data


Result diagrams: 


                                 22 19:32





                                 22 19:32


                                   Lab Results











  22 Range/Units





  19:32 19:32 


 


WBC  6.6   (3.8-10.6)  k/uL


 


RBC  4.88   (4.30-5.90)  m/uL


 


Hgb  14.5   (13.0-17.5)  gm/dL


 


Hct  45.7   (39.0-53.0)  %


 


MCV  93.5   (80.0-100.0)  fL


 


MCH  29.8   (25.0-35.0)  pg


 


MCHC  31.9   (31.0-37.0)  g/dL


 


RDW  14.6   (11.5-15.5)  %


 


Plt Count  246   (150-450)  k/uL


 


MPV  7.8   


 


Neutrophils %  85   %


 


Lymphocytes %  10   %


 


Monocytes %  4   %


 


Eosinophils %  1   %


 


Basophils %  1   %


 


Neutrophils #  5.6   (1.3-7.7)  k/uL


 


Lymphocytes #  0.6 L   (1.0-4.8)  k/uL


 


Monocytes #  0.3   (0-1.0)  k/uL


 


Eosinophils #  0.0   (0-0.7)  k/uL


 


Basophils #  0.0   (0-0.2)  k/uL


 


Hypochromasia  Slight   


 


Sodium   137  (137-145)  mmol/L


 


Potassium   3.9  (3.5-5.1)  mmol/L


 


Chloride   102  ()  mmol/L


 


Carbon Dioxide   16 L  (22-30)  mmol/L


 


Anion Gap   19  mmol/L


 


BUN   10  (9-20)  mg/dL


 


Creatinine   0.64 L  (0.66-1.25)  mg/dL


 


Est GFR (CKD-EPI)AfAm   >90  (>60 ml/min/1.73 sqM)  


 


Est GFR (CKD-EPI)NonAf   >90  (>60 ml/min/1.73 sqM)  


 


Glucose   88  (74-99)  mg/dL


 


Calcium   9.4  (8.4-10.2)  mg/dL


 


Total Bilirubin   1.0  (0.2-1.3)  mg/dL


 


AST   22  (17-59)  U/L


 


ALT   14  (4-49)  U/L


 


Alkaline Phosphatase   118  ()  U/L


 


Total Protein   7.4  (6.3-8.2)  g/dL


 


Albumin   4.5  (3.5-5.0)  g/dL


 


Amylase   176 H  ()  U/L


 


Lipase   50  ()  U/L














- Radiology Data


Radiology results: image reviewed (Abdominal x-ray reveals no acute process)





Disposition


Clinical Impression: 


 Chronic vomiting





Disposition: HOME SELF-CARE


Condition: Stable


Instructions (If sedation given, give patient instructions):  Acute Nausea and 

Vomiting (ED)


Additional Instructions: 


Please follow-up with primary care physician in the next day or 2 for recheck.  

Please also do follow-up with your surgeon.  Return for increased pain, 

vomiting, fever, worsening or changing symptoms or other concerns.


Prescriptions: 


Famotidine [Pepcid] 20 mg PO BID #30 tablet


Is patient prescribed a controlled substance at d/c from ED?: No


Referrals: 


Tyra Marvin MD [Primary Care Provider] - 1-2 days


Time of Disposition: 20:46

## 2022-08-09 ENCOUNTER — HOSPITAL ENCOUNTER (EMERGENCY)
Dept: HOSPITAL 47 - EC | Age: 42
Discharge: HOME | End: 2022-08-09
Payer: COMMERCIAL

## 2022-08-09 VITALS — SYSTOLIC BLOOD PRESSURE: 143 MMHG | HEART RATE: 64 BPM | TEMPERATURE: 98.4 F | DIASTOLIC BLOOD PRESSURE: 103 MMHG

## 2022-08-09 VITALS — RESPIRATION RATE: 18 BRPM

## 2022-08-09 DIAGNOSIS — Z91.040: ICD-10-CM

## 2022-08-09 DIAGNOSIS — Z88.5: ICD-10-CM

## 2022-08-09 DIAGNOSIS — R11.2: ICD-10-CM

## 2022-08-09 DIAGNOSIS — I48.91: ICD-10-CM

## 2022-08-09 DIAGNOSIS — R10.13: Primary | ICD-10-CM

## 2022-08-09 DIAGNOSIS — K21.9: ICD-10-CM

## 2022-08-09 DIAGNOSIS — Z79.899: ICD-10-CM

## 2022-08-09 LAB
ALBUMIN SERPL-MCNC: 4.2 G/DL (ref 3.5–5)
ALP SERPL-CCNC: 102 U/L (ref 38–126)
ALT SERPL-CCNC: 13 U/L (ref 4–49)
ANION GAP SERPL CALC-SCNC: 18 MMOL/L
APTT BLD: 20.1 SEC (ref 22–30)
AST SERPL-CCNC: 18 U/L (ref 17–59)
BASOPHILS # BLD AUTO: 0 K/UL (ref 0–0.2)
BASOPHILS NFR BLD AUTO: 1 %
BUN SERPL-SCNC: 6 MG/DL (ref 9–20)
CALCIUM SPEC-MCNC: 9.2 MG/DL (ref 8.4–10.2)
CHLORIDE SERPL-SCNC: 101 MMOL/L (ref 98–107)
CO2 SERPL-SCNC: 19 MMOL/L (ref 22–30)
EOSINOPHIL # BLD AUTO: 0.1 K/UL (ref 0–0.7)
EOSINOPHIL NFR BLD AUTO: 1 %
ERYTHROCYTE [DISTWIDTH] IN BLOOD BY AUTOMATED COUNT: 4.85 M/UL (ref 4.3–5.9)
ERYTHROCYTE [DISTWIDTH] IN BLOOD: 14.2 % (ref 11.5–15.5)
GLUCOSE SERPL-MCNC: 99 MG/DL (ref 74–99)
HCT VFR BLD AUTO: 42.4 % (ref 39–53)
HGB BLD-MCNC: 13.9 GM/DL (ref 13–17.5)
INR PPP: 1.1 (ref ?–1.2)
LIPASE SERPL-CCNC: 64 U/L (ref 23–300)
LYMPHOCYTES # SPEC AUTO: 0.8 K/UL (ref 1–4.8)
LYMPHOCYTES NFR SPEC AUTO: 16 %
MAGNESIUM SPEC-SCNC: 1.8 MG/DL (ref 1.6–2.3)
MCH RBC QN AUTO: 28.7 PG (ref 25–35)
MCHC RBC AUTO-ENTMCNC: 32.8 G/DL (ref 31–37)
MCV RBC AUTO: 87.5 FL (ref 80–100)
MONOCYTES # BLD AUTO: 0.4 K/UL (ref 0–1)
MONOCYTES NFR BLD AUTO: 8 %
NEUTROPHILS # BLD AUTO: 3.9 K/UL (ref 1.3–7.7)
NEUTROPHILS NFR BLD AUTO: 74 %
PLATELET # BLD AUTO: 233 K/UL (ref 150–450)
POTASSIUM SERPL-SCNC: 3.3 MMOL/L (ref 3.5–5.1)
PROT SERPL-MCNC: 7 G/DL (ref 6.3–8.2)
PT BLD: 11.7 SEC (ref 9–12)
SODIUM SERPL-SCNC: 138 MMOL/L (ref 137–145)
WBC # BLD AUTO: 5.3 K/UL (ref 3.8–10.6)

## 2022-08-09 PROCEDURE — 85610 PROTHROMBIN TIME: CPT

## 2022-08-09 PROCEDURE — 85730 THROMBOPLASTIN TIME PARTIAL: CPT

## 2022-08-09 PROCEDURE — 85025 COMPLETE CBC W/AUTO DIFF WBC: CPT

## 2022-08-09 PROCEDURE — 83735 ASSAY OF MAGNESIUM: CPT

## 2022-08-09 PROCEDURE — 80053 COMPREHEN METABOLIC PANEL: CPT

## 2022-08-09 PROCEDURE — 99285 EMERGENCY DEPT VISIT HI MDM: CPT

## 2022-08-09 PROCEDURE — 96374 THER/PROPH/DIAG INJ IV PUSH: CPT

## 2022-08-09 PROCEDURE — 83690 ASSAY OF LIPASE: CPT

## 2022-08-09 PROCEDURE — 36415 COLL VENOUS BLD VENIPUNCTURE: CPT

## 2022-08-09 PROCEDURE — 93005 ELECTROCARDIOGRAM TRACING: CPT

## 2022-08-09 PROCEDURE — 71046 X-RAY EXAM CHEST 2 VIEWS: CPT

## 2022-08-09 PROCEDURE — 96375 TX/PRO/DX INJ NEW DRUG ADDON: CPT

## 2022-08-09 PROCEDURE — 84484 ASSAY OF TROPONIN QUANT: CPT

## 2022-08-09 NOTE — ED
Chest Pain HPI





- General


Chief Complaint: Chest Pain


Stated Complaint: chest pain, vomiting


Time Seen by Provider: 22 10:55


Source: patient


Mode of arrival: wheelchair





- History of Present Illness


Initial Comments: 





42-year-old male with past medical history of Carola-en-Y, chronic nausea and 

vomiting since hiatal hernia surgery, chronic back pain on narcotics presents to

the emergency department with nausea and vomiting.  Patient has been seen in the

emergency department for similar complaints.  Patient presents that he is 

currently wearing a heart monitor and a company called him earlier this morning.

 States that he had to go immediately into the emergency room in for evaluation 

as his cardiac monitor was showing an irregular heart rhythm.  Patient had 

concern that he was in A. fib and she does have a history.  She also admits to 

nausea and vomiting.  Recently the emergency room for similar complaint.  States

that this has been chronic since his surgery however has not made contact with 

his surgeon. No fevers. No hematemesis.  No other alleviating, precipitating or 

modifying factors





- Related Data


                                Home Medications











 Medication  Instructions  Recorded  Confirmed


 


Cyclobenzaprine [Flexeril] 10 mg PO HS PRN 22


 


Metoprolol Tartrate [Lopressor] 12.5 mg PO BID 22


 


oxyCODONE HCL [OxyIR] 5 mg PO Q6H PRN 22


 


traZODone  mg PO HS 22








                                  Previous Rx's











 Medication  Instructions  Recorded


 


Famotidine [Pepcid] 20 mg PO BID #30 tablet 22











                                    Allergies











Allergy/AdvReac Type Severity Reaction Status Date / Time


 


latex Allergy  Rash/Hives Verified 22 15:33


 


meperidine [From Demerol] Allergy  Rash/Hives Verified 22 15:33














Review of Systems


ROS Statement: 


Those systems with pertinent positive or pertinent negative responses have been 

documented in the HPI.





ROS Other: All systems not noted in ROS Statement are negative.





EKG Findings





- EKG Comments:


EKG Findings:: EKG demonstrates sinus rhythm with a rate of 91. WV interval 144.

 QRS 95.  QTC of 418.  No acute ST segment elevations or depressions





Past Medical History


Past Medical History: Atrial Fibrillation, GERD/Reflux


Additional Past Medical History / Comment(s): Pt states difficulty with 

abdominal pain/nausea and vomiting when eating since surgery for hiatal 

hernia/peg tube, afib during [facility] hospitalization for carola en Y/peg tube surgery,

R shoulder dislocations, pt states recent EKG at Dr. Marvin's office showed MI at

some time, chronic low back pain


History of Any Multi-Drug Resistant Organisms: None Reported


Past Surgical History: Appendectomy, Back Surgery, Cholecystectomy, Hernia 

Repair, Orthopedic Surgery, Tonsillectomy


Additional Past Surgical History / Comment(s): Lysis of abdominal adhesions then

transfetted to [facility] for Carola en Y, peg tube insertion, EGD, diaphragmatic hernia 

repair, lumbar fusion, R shoulder arthroscopy, L shoulder fusion.


Past Anesthesia/Blood Transfusion Reactions: No Reported Reaction


Past Psychological History: No Psychological Hx Reported


Smoking Status: Never smoker


Past Alcohol Use History: None Reported


Past Drug Use History: None Reported





- Past Family History


  ** Mother


Additional Family Medical History / Comment(s): Mother is , she had 

lupus.





  ** Father


History Unknown: Yes





  ** Brother(s)


Additional Family Medical History / Comment(s): autoimmune disorder





General Exam


General appearance: alert, in no apparent distress


Head exam: Present: atraumatic, normocephalic, normal inspection


Eye exam: Present: normal appearance, PERRL, EOMI.  Absent: scleral icterus, 

conjunctival injection, periorbital swelling


ENT exam: Present: normal exam, mucous membranes moist


Neck exam: Present: normal inspection.  Absent: tenderness, meningismus, 

lymphadenopathy


Respiratory exam: Present: normal lung sounds bilaterally.  Absent: respiratory 

distress, wheezes, rales, rhonchi, stridor


Cardiovascular Exam: Present: normal rhythm, tachycardia, normal heart sounds.  

Absent: systolic murmur, diastolic murmur, rubs, gallop, clicks


GI/Abdominal exam: Present: soft, normal bowel sounds.  Absent: distended, 

tenderness, guarding, rebound, rigid


Extremities exam: Present: normal inspection, full ROM, normal capillary refill.

 Absent: tenderness, pedal edema, joint swelling, calf tenderness


Back exam: Present: normal inspection


Neurological exam: Present: alert, oriented X3, CN II-XII intact


Psychiatric exam: Present: normal affect, normal mood


Skin exam: Present: warm, dry, intact, normal color.  Absent: rash





Course


                                   Vital Signs











  22





  10:47 10:55 11:04


 


Temperature 98 F  


 


Pulse Rate 108 H  64


 


Pulse Rate [  79 





Cardiac Monitor   





]   


 


Respiratory 18  18





Rate   


 


Blood Pressure 145/98  170/79


 


O2 Sat by Pulse 98  99





Oximetry   














  22





  11:40 12:17 13:12


 


Temperature   


 


Pulse Rate 62 70 100


 


Pulse Rate [   





Cardiac Monitor   





]   


 


Respiratory 18 18 18





Rate   


 


Blood Pressure 151/135 158/89 154/103


 


O2 Sat by Pulse 97 97 97





Oximetry   














  22





  14:03


 


Temperature 98.4 F


 


Pulse Rate 64


 


Pulse Rate [ 





Cardiac Monitor 





] 


 


Respiratory 18





Rate 


 


Blood Pressure 143/103


 


O2 Sat by Pulse 98





Oximetry 














Chest Pain MDM





- MDM





Upon arrival patient was placed in trauma 1.  Thorough and history of physical 

exam was performed.  IV established.  Laboratory studies were conducted.  EKG is

performed.  Patient was given Toradol and Zofran for pain and nausea.  He is 

repetitively requesting narcotic pain medications.  I did call and speak with 

the heart monitor company.  They state that the patient has not had any 

transmissions from his heart monitor since the .  He reports that the left a

message with the patient however states that they never talked to the patient 

and they did not directed to going to the emergency department.  I reviewed the 

patient's CT which demonstrated no intra-abdominal finding.  Inform the patient 

that he will be discharged home and needs to follow-up with the surgeon for his 

chronic issue.  Patient agreeable and discharged home in stable condition





Disposition


Clinical Impression: 


 Vomiting, Epigastric abdominal pain





Disposition: HOME SELF-CARE


Condition: Stable


Instructions (If sedation given, give patient instructions):  Abdominal Pain 

(ED)


Additional Instructions: 


Call your GI doctor for further treatment.


Is patient prescribed a controlled substance at d/c from ED?: No


Referrals: 


Tyra Marvin MD [Primary Care Provider] - 1-2 days


Time of Disposition: 13:42

## 2022-08-09 NOTE — XR
EXAMINATION TYPE: XR chest 2V

 

DATE OF EXAM: 8/9/2022

 

COMPARISON: 8/4/2022

 

TECHNIQUE: PA and lateral views submitted.

 

HISTORY: Chest pain

 

FINDINGS:

The lungs are clear and  there is no pneumothorax, pleural effusion, or focal pneumonia.  Chronic-qasim
earing changes of the left shoulder was questionable joint. Surgical changes in the right

COPD suspected. Heart size normal. Degenerative change of the spine.

Small hiatal hernia seen linear changes left lung base.

 

IMPRESSION: 

1. Left basilar subsegmental consolidation with very minimal blunting of the left costophrenic angle.
 Atelectasis with tiny effusion favored over early infiltrate correlate clinically.

2. Correlate for COPD...

## 2022-09-09 ENCOUNTER — HOSPITAL ENCOUNTER (EMERGENCY)
Dept: HOSPITAL 47 - EC | Age: 42
Discharge: HOME | End: 2022-09-09
Payer: COMMERCIAL

## 2022-09-09 VITALS — SYSTOLIC BLOOD PRESSURE: 126 MMHG | DIASTOLIC BLOOD PRESSURE: 78 MMHG | TEMPERATURE: 97.8 F | HEART RATE: 92 BPM

## 2022-09-09 VITALS — RESPIRATION RATE: 18 BRPM

## 2022-09-09 DIAGNOSIS — R10.11: Primary | ICD-10-CM

## 2022-09-09 DIAGNOSIS — Z91.040: ICD-10-CM

## 2022-09-09 DIAGNOSIS — Z79.899: ICD-10-CM

## 2022-09-09 DIAGNOSIS — I48.91: ICD-10-CM

## 2022-09-09 DIAGNOSIS — K21.9: ICD-10-CM

## 2022-09-09 DIAGNOSIS — Z88.5: ICD-10-CM

## 2022-09-09 LAB
ALBUMIN SERPL-MCNC: 4 G/DL (ref 3.5–5)
ALP SERPL-CCNC: 174 U/L (ref 38–126)
ALT SERPL-CCNC: 193 U/L (ref 4–49)
ANION GAP SERPL CALC-SCNC: 10 MMOL/L
APTT BLD: 23.4 SEC (ref 22–30)
AST SERPL-CCNC: 77 U/L (ref 17–59)
BASOPHILS # BLD AUTO: 0 K/UL (ref 0–0.2)
BASOPHILS NFR BLD AUTO: 1 %
BUN SERPL-SCNC: 7 MG/DL (ref 9–20)
CALCIUM SPEC-MCNC: 8.7 MG/DL (ref 8.4–10.2)
CHLORIDE SERPL-SCNC: 105 MMOL/L (ref 98–107)
CO2 SERPL-SCNC: 24 MMOL/L (ref 22–30)
EOSINOPHIL # BLD AUTO: 0 K/UL (ref 0–0.7)
EOSINOPHIL NFR BLD AUTO: 0 %
ERYTHROCYTE [DISTWIDTH] IN BLOOD BY AUTOMATED COUNT: 4.43 M/UL (ref 4.3–5.9)
ERYTHROCYTE [DISTWIDTH] IN BLOOD: 14.5 % (ref 11.5–15.5)
GLUCOSE SERPL-MCNC: 103 MG/DL (ref 74–99)
HCT VFR BLD AUTO: 40.6 % (ref 39–53)
HGB BLD-MCNC: 13 GM/DL (ref 13–17.5)
INR PPP: 0.9 (ref ?–1.2)
LIPASE SERPL-CCNC: 91 U/L (ref 23–300)
LYMPHOCYTES # SPEC AUTO: 0.8 K/UL (ref 1–4.8)
LYMPHOCYTES NFR SPEC AUTO: 13 %
MCH RBC QN AUTO: 29.3 PG (ref 25–35)
MCHC RBC AUTO-ENTMCNC: 31.9 G/DL (ref 31–37)
MCV RBC AUTO: 91.6 FL (ref 80–100)
MONOCYTES # BLD AUTO: 0.4 K/UL (ref 0–1)
MONOCYTES NFR BLD AUTO: 6 %
NEUTROPHILS # BLD AUTO: 4.9 K/UL (ref 1.3–7.7)
NEUTROPHILS NFR BLD AUTO: 80 %
PLATELET # BLD AUTO: 271 K/UL (ref 150–450)
POTASSIUM SERPL-SCNC: 3.8 MMOL/L (ref 3.5–5.1)
PROT SERPL-MCNC: 6.4 G/DL (ref 6.3–8.2)
PT BLD: 10.2 SEC (ref 9–12)
SODIUM SERPL-SCNC: 139 MMOL/L (ref 137–145)
WBC # BLD AUTO: 6.2 K/UL (ref 3.8–10.6)

## 2022-09-09 PROCEDURE — 93005 ELECTROCARDIOGRAM TRACING: CPT

## 2022-09-09 PROCEDURE — 83605 ASSAY OF LACTIC ACID: CPT

## 2022-09-09 PROCEDURE — 96361 HYDRATE IV INFUSION ADD-ON: CPT

## 2022-09-09 PROCEDURE — 99285 EMERGENCY DEPT VISIT HI MDM: CPT

## 2022-09-09 PROCEDURE — 96374 THER/PROPH/DIAG INJ IV PUSH: CPT

## 2022-09-09 PROCEDURE — 85025 COMPLETE CBC W/AUTO DIFF WBC: CPT

## 2022-09-09 PROCEDURE — 85610 PROTHROMBIN TIME: CPT

## 2022-09-09 PROCEDURE — 80053 COMPREHEN METABOLIC PANEL: CPT

## 2022-09-09 PROCEDURE — 74177 CT ABD & PELVIS W/CONTRAST: CPT

## 2022-09-09 PROCEDURE — 83690 ASSAY OF LIPASE: CPT

## 2022-09-09 PROCEDURE — 36415 COLL VENOUS BLD VENIPUNCTURE: CPT

## 2022-09-09 PROCEDURE — 84484 ASSAY OF TROPONIN QUANT: CPT

## 2022-09-09 PROCEDURE — 85730 THROMBOPLASTIN TIME PARTIAL: CPT

## 2022-09-09 PROCEDURE — 96375 TX/PRO/DX INJ NEW DRUG ADDON: CPT

## 2022-09-09 PROCEDURE — 96376 TX/PRO/DX INJ SAME DRUG ADON: CPT

## 2022-09-09 NOTE — ED
Abdominal Pain HPI





- General


Chief Complaint: Abdominal Pain


Stated Complaint: chest pain


Time Seen by Provider: 22 12:00


Source: patient, RN notes reviewed, old records reviewed


Mode of arrival: ambulatory


Limitations: no limitations





- History of Present Illness


Initial Comments: 





This is a 42-year-old male, alert and oriented 4 presenting with left upper 

quadrant abdominal pain today.  He was sent by his primary care doctor for 

elevated heart rate and increasing abdominal pain.  Patient states that he did 

have a large hiatal hernia repair at [facility] in February of this year

and he has pain ever since.  He does have a history of cholecystectomy and 

appendectomy.  He was requested to follow up with Dr. Chi after endoscopy 2 mo

nths ago at [facility]. 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  Patient denies any fevers, no 

nausea vomiting or diarrhea.


MD Complaint: abdominal pain (left upper)


-: days(s) (1)


Location: [facility]


Severity scale (1-10): 10


Consistency: constant


Improves With: nothing


Associated Symptoms: denies other symptoms





- Related Data


                                Home Medications











 Medication  Instructions  Recorded  Confirmed


 


Metoprolol Tartrate [Lopressor] 12.5 mg PO BID 22


 


traZODone  mg PO HS 22


 


Cyclobenzaprine [Flexeril] 5 mg PO BID PRN 22


 


Metoclopramide [Reglan] 5 mg PO ACHS 22


 


Mirtazapine 7.5 mg PO HS 22


 


Ondansetron Odt [Zofran Odt] 8 mg PO Q8H PRN 22








                                  Previous Rx's











 Medication  Instructions  Recorded


 


Famotidine [Pepcid] 20 mg PO BID #30 tablet 22











                                    Allergies











Allergy/AdvReac Type Severity Reaction Status Date / Time


 


latex Allergy  Rash/Hives Verified 22 13:07


 


meperidine [From Demerol] Allergy  Rash/Hives Verified 22 13:07














Review of Systems


ROS Statement: 


Those systems with pertinent positive or pertinent negative responses have been 

documented in the HPI.





ROS Other: All systems not noted in ROS Statement are negative.





Past Medical History


Past Medical History: Atrial Fibrillation, GERD/Reflux


Additional Past Medical History / Comment(s): Pt states difficulty with 

abdominal pain/nausea and vomiting when eating since surgery for hiatal 

hernia/peg tube, afib during [facility] hospitalization for al en Y/peg tube surgery,

R shoulder dislocations, pt states recent EKG at Dr. Marvin's office showed MI at

some time, chronic low back pain


History of Any Multi-Drug Resistant Organisms: None Reported


Past Surgical History: Appendectomy, Back Surgery, Cholecystectomy, Hernia 

Repair, Orthopedic Surgery, Tonsillectomy


Additional Past Surgical History / Comment(s): Lysis of abdominal adhesions then

transfetted to [facility] for Al en Y, peg tube insertion, EGD, diaphragmatic hernia 

repair, lumbar fusion, R shoulder arthroscopy, L shoulder fusion.


Past Anesthesia/Blood Transfusion Reactions: No Reported Reaction


Past Psychological History: No Psychological Hx Reported


Smoking Status: Never smoker


Past Alcohol Use History: None Reported


Past Drug Use History: None Reported





- Past Family History


  ** Mother


Additional Family Medical History / Comment(s): Mother is , she had 

lupus.





  ** Father


History Unknown: Yes





  ** Brother(s)


Additional Family Medical History / Comment(s): autoimmune disorder





General Exam


Limitations: no limitations


General appearance: alert, in no apparent distress


Head exam: Present: atraumatic


Eye exam: Absent: scleral icterus, conjunctival injection, periorbital swelling


ENT exam: Present: mucous membranes moist


Neck exam: Present: full ROM.  Absent: meningismus


Respiratory exam: Present: normal lung sounds bilaterally.  Absent: respiratory 

distress, wheezes, rales, rhonchi, stridor, accessory muscle use


Cardiovascular Exam: Present: tachycardia


GI/Abdominal exam: Present: soft, tenderness (luq), other (midline scar).  

Absent: distended, guarding, rebound, rigid


Extremities exam: Present: normal capillary refill.  Absent: pedal edema


Back exam: Present: normal inspection, full ROM.  Absent: tenderness, CVA 

tenderness (R), CVA tenderness (L), rash noted


Neurological exam: Present: alert, oriented X3


Psychiatric exam: Present: normal affect, normal mood


Skin exam: Present: warm, dry, normal color.  Absent: cyanosis, diaphoretic





Course


                                   Vital Signs











  22





  11:27 14:33


 


Temperature 98.4 F 


 


Pulse Rate 120 H 95


 


Respiratory 24 18





Rate  


 


Blood Pressure 126/83 125/77


 


O2 Sat by Pulse 97 100





Oximetry  














Medical Decision Making





- Medical Decision Making


Patient presents with left upper quadrant pain chronic since February worsening 

today.  He denies any fevers.  No nausea vomiting or diarrhea.


CT abdomen and pelvis with contrast shows a previous gastric bypass procedure 

with hiatal hernia noted.  No evidence of obstruction or free air.  There are 

several small bowel loops mildly distended which may represent enteritis.


Labs show no evidence of leukocytosis.  Lactic acid is 1.2.


Patient was given multiple doses of Dilaudid in the ER.  Patient's abdominal 

surgeries were performed at [facility] in February of this year. He 

states that they will no longer will see him in the ER and states when he goes 

there they just send him home.


At this time I do not have a source for his severe pain.  We did discuss the 

possibility of adhesions versus hiatal hernia pain.  


He does have an appointment coming up with Dr. Chi  for evaluation 

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  


Patient was directed to keep his appointment with Dr. Chi and return to the 

emergency room with any new or concerning symptoms. 


VSS.


He is agreeable to discharge.  Case discussed with Dr. Vega. 





- Lab Data


Result diagrams: 


                                 22 11:40





                                 22 11:40


                                   Lab Results











  22 Range/Units





  11:40 11:40 11:40 


 


WBC  6.2    (3.8-10.6)  k/uL


 


RBC  4.43    (4.30-5.90)  m/uL


 


Hgb  13.0    (13.0-17.5)  gm/dL


 


Hct  40.6    (39.0-53.0)  %


 


MCV  91.6    (80.0-100.0)  fL


 


MCH  29.3    (25.0-35.0)  pg


 


MCHC  31.9    (31.0-37.0)  g/dL


 


RDW  14.5    (11.5-15.5)  %


 


Plt Count  271    (150-450)  k/uL


 


MPV  7.4    


 


Neutrophils %  80    %


 


Lymphocytes %  13    %


 


Monocytes %  6    %


 


Eosinophils %  0    %


 


Basophils %  1    %


 


Neutrophils #  4.9    (1.3-7.7)  k/uL


 


Lymphocytes #  0.8 L    (1.0-4.8)  k/uL


 


Monocytes #  0.4    (0-1.0)  k/uL


 


Eosinophils #  0.0    (0-0.7)  k/uL


 


Basophils #  0.0    (0-0.2)  k/uL


 


PT   10.2   (9.0-12.0)  sec


 


INR   0.9   (<1.2)  


 


APTT   23.4   (22.0-30.0)  sec


 


Sodium    139  (137-145)  mmol/L


 


Potassium    3.8  (3.5-5.1)  mmol/L


 


Chloride    105  ()  mmol/L


 


Carbon Dioxide    24  (22-30)  mmol/L


 


Anion Gap    10  mmol/L


 


BUN    7 L  (9-20)  mg/dL


 


Creatinine    0.67  (0.66-1.25)  mg/dL


 


Est GFR (CKD-EPI)AfAm    >90  (>60 ml/min/1.73 sqM)  


 


Est GFR (CKD-EPI)NonAf    >90  (>60 ml/min/1.73 sqM)  


 


Glucose    103 H  (74-99)  mg/dL


 


Plasma Lactic Acid Frankie     (0.7-2.0)  mmol/L


 


Calcium    8.7  (8.4-10.2)  mg/dL


 


Total Bilirubin    0.4  (0.2-1.3)  mg/dL


 


AST    77 H  (17-59)  U/L


 


ALT    193 H  (4-49)  U/L


 


Alkaline Phosphatase    174 H  ()  U/L


 


Troponin I     (0.000-0.034)  ng/mL


 


Total Protein    6.4  (6.3-8.2)  g/dL


 


Albumin    4.0  (3.5-5.0)  g/dL


 


Lipase    91  ()  U/L














  22 Range/Units





  11:40 12:32 


 


WBC    (3.8-10.6)  k/uL


 


RBC    (4.30-5.90)  m/uL


 


Hgb    (13.0-17.5)  gm/dL


 


Hct    (39.0-53.0)  %


 


MCV    (80.0-100.0)  fL


 


MCH    (25.0-35.0)  pg


 


MCHC    (31.0-37.0)  g/dL


 


RDW    (11.5-15.5)  %


 


Plt Count    (150-450)  k/uL


 


MPV    


 


Neutrophils %    %


 


Lymphocytes %    %


 


Monocytes %    %


 


Eosinophils %    %


 


Basophils %    %


 


Neutrophils #    (1.3-7.7)  k/uL


 


Lymphocytes #    (1.0-4.8)  k/uL


 


Monocytes #    (0-1.0)  k/uL


 


Eosinophils #    (0-0.7)  k/uL


 


Basophils #    (0-0.2)  k/uL


 


PT    (9.0-12.0)  sec


 


INR    (<1.2)  


 


APTT    (22.0-30.0)  sec


 


Sodium    (137-145)  mmol/L


 


Potassium    (3.5-5.1)  mmol/L


 


Chloride    ()  mmol/L


 


Carbon Dioxide    (22-30)  mmol/L


 


Anion Gap    mmol/L


 


BUN    (9-20)  mg/dL


 


Creatinine    (0.66-1.25)  mg/dL


 


Est GFR (CKD-EPI)AfAm    (>60 ml/min/1.73 sqM)  


 


Est GFR (CKD-EPI)NonAf    (>60 ml/min/1.73 sqM)  


 


Glucose    (74-99)  mg/dL


 


Plasma Lactic Acid Frankie   1.2  (0.7-2.0)  mmol/L


 


Calcium    (8.4-10.2)  mg/dL


 


Total Bilirubin    (0.2-1.3)  mg/dL


 


AST    (17-59)  U/L


 


ALT    (4-49)  U/L


 


Alkaline Phosphatase    ()  U/L


 


Troponin I  <0.012   (0.000-0.034)  ng/mL


 


Total Protein    (6.3-8.2)  g/dL


 


Albumin    (3.5-5.0)  g/dL


 


Lipase    ()  U/L














- EKG Data


EKG shows normal: sinus rhythm (Ventricular rate 108, QRS 0.89, QTC 0.394)





Disposition


Clinical Impression: 


 Abdominal pain





Disposition: HOME SELF-CARE


Condition: Good


Instructions (If sedation given, give patient instructions):  Abdominal Pain 

(ED)


Additional Instructions: 


At this time I do not see any abnormalities in your blood work.  Your abdominal 

CT scan does not show any signs of obstruction.  There is a hiatal hernia which 

you are aware of.  I do not have a cause for your abdominal pain today.  It may 

be related to adhesions versus hiatal hernia pain.


Follow-up with primary care doctor.  Keep your appointment with Dr. Chi as 

scheduled in .  Return to emergency room with any new or concerning 

symptoms


Is patient prescribed a controlled substance at d/c from ED?: No


Referrals: 


Tyra Marvin MD [Primary Care Provider] - 1-2 days


Time of Disposition: 14:36

## 2022-09-09 NOTE — CT
EXAMINATION TYPE: CT abdomen pelvis w con

 

DATE OF EXAM: 9/9/2022

 

COMPARISON: 8/5/2022

 

HISTORY: Lt sided pain

 

CT DLP: 1247.6 mGycm

 

CONTRAST: 

CT scan of the abdomen and pelvis is performed without Oral Contrast and with IV Contrast, patient in
jected with 100 mL of Isovue 300.

 

FINDINGS: 

LUNG BASES-: No visible nodule.  No infiltrate. 

 

LIVER/GB:   The gallbladder is surgically absent.  No space occupying hepatic lesion. Biliary tree is
 of normal caliber. 

 

PANCREAS:  No inflammation.  No distinct mass. 

 

SPLEEN:  No splenic enlargement.  No lesion seen. 

 

ADRENALS:  No nodule.  No thickening. 

 

KIDNEYS/BLADDER:  No hydronephrosis.  No nephrolithiasis.  No distinct renal mass.  Urinary bladder g
rossly unremarkable. 

 

BOWEL: Hiatal hernia noted. Previous gastric bypass procedure seen. There is evidence for postoperati
ve change involving left-sided jejunal loops. Several of the small bowel loops demonstrate mild diste
ntion with fluid opacification which may reflect enteritis. Correlate clinically. Small bowel appears
 to be of normal caliber. No evidence for free air or abscess.

 

GENITAL ORGANS:  No gross abnormality. 

 

LYMPH NODES:  No greater than 1cm abdominal or pelvic lymph nodes are appreciated.

 

AORTA: No significant abnormality. 

 

OSSEOUS STRUCTURES: Postoperative changes of fusion involving L5-S1.

 

OTHER:  No significant additional abnormality is seen. 

 

IMPRESSION: 

1. Correlate for small bowel enteritis.

## 2022-09-11 ENCOUNTER — HOSPITAL ENCOUNTER (INPATIENT)
Dept: HOSPITAL 47 - EC | Age: 42
LOS: 2 days | Discharge: ANOTHER HOSPITAL | DRG: 390 | End: 2022-09-13
Attending: HOSPITALIST | Admitting: HOSPITALIST
Payer: COMMERCIAL

## 2022-09-11 DIAGNOSIS — K56.7: Primary | ICD-10-CM

## 2022-09-11 DIAGNOSIS — Z87.19: ICD-10-CM

## 2022-09-11 DIAGNOSIS — K52.9: ICD-10-CM

## 2022-09-11 DIAGNOSIS — K44.9: ICD-10-CM

## 2022-09-11 DIAGNOSIS — R50.9: ICD-10-CM

## 2022-09-11 DIAGNOSIS — G89.29: ICD-10-CM

## 2022-09-11 DIAGNOSIS — I48.91: ICD-10-CM

## 2022-09-11 DIAGNOSIS — Z93.1: ICD-10-CM

## 2022-09-11 DIAGNOSIS — Z98.84: ICD-10-CM

## 2022-09-11 DIAGNOSIS — R11.2: ICD-10-CM

## 2022-09-11 DIAGNOSIS — K21.9: ICD-10-CM

## 2022-09-11 DIAGNOSIS — Z88.5: ICD-10-CM

## 2022-09-11 DIAGNOSIS — F41.9: ICD-10-CM

## 2022-09-11 DIAGNOSIS — Z90.49: ICD-10-CM

## 2022-09-11 DIAGNOSIS — Z91.040: ICD-10-CM

## 2022-09-11 DIAGNOSIS — M54.9: ICD-10-CM

## 2022-09-11 LAB
ALBUMIN SERPL-MCNC: 4 G/DL (ref 3.5–5)
ALP SERPL-CCNC: 153 U/L (ref 38–126)
ALT SERPL-CCNC: 90 U/L (ref 4–49)
AMYLASE SERPL-CCNC: 86 U/L (ref 30–110)
ANION GAP SERPL CALC-SCNC: 10 MMOL/L
AST SERPL-CCNC: 26 U/L (ref 17–59)
BASOPHILS # BLD AUTO: 0 K/UL (ref 0–0.2)
BASOPHILS NFR BLD AUTO: 1 %
BUN SERPL-SCNC: 4 MG/DL (ref 9–20)
CALCIUM SPEC-MCNC: 8.8 MG/DL (ref 8.4–10.2)
CHLORIDE SERPL-SCNC: 103 MMOL/L (ref 98–107)
CO2 SERPL-SCNC: 25 MMOL/L (ref 22–30)
EOSINOPHIL # BLD AUTO: 0.1 K/UL (ref 0–0.7)
EOSINOPHIL NFR BLD AUTO: 1 %
ERYTHROCYTE [DISTWIDTH] IN BLOOD BY AUTOMATED COUNT: 4.49 M/UL (ref 4.3–5.9)
ERYTHROCYTE [DISTWIDTH] IN BLOOD: 14.5 % (ref 11.5–15.5)
GLUCOSE SERPL-MCNC: 86 MG/DL (ref 74–99)
HCT VFR BLD AUTO: 41.3 % (ref 39–53)
HGB BLD-MCNC: 12.9 GM/DL (ref 13–17.5)
LIPASE SERPL-CCNC: 76 U/L (ref 23–300)
LYMPHOCYTES # SPEC AUTO: 1.4 K/UL (ref 1–4.8)
LYMPHOCYTES NFR SPEC AUTO: 24 %
MCH RBC QN AUTO: 28.8 PG (ref 25–35)
MCHC RBC AUTO-ENTMCNC: 31.3 G/DL (ref 31–37)
MCV RBC AUTO: 91.8 FL (ref 80–100)
MONOCYTES # BLD AUTO: 0.4 K/UL (ref 0–1)
MONOCYTES NFR BLD AUTO: 6 %
NEUTROPHILS # BLD AUTO: 3.9 K/UL (ref 1.3–7.7)
NEUTROPHILS NFR BLD AUTO: 67 %
PLATELET # BLD AUTO: 278 K/UL (ref 150–450)
POTASSIUM SERPL-SCNC: 3.7 MMOL/L (ref 3.5–5.1)
PROT SERPL-MCNC: 6.4 G/DL (ref 6.3–8.2)
SODIUM SERPL-SCNC: 138 MMOL/L (ref 137–145)
WBC # BLD AUTO: 5.9 K/UL (ref 3.8–10.6)

## 2022-09-11 PROCEDURE — 82150 ASSAY OF AMYLASE: CPT

## 2022-09-11 PROCEDURE — 96361 HYDRATE IV INFUSION ADD-ON: CPT

## 2022-09-11 PROCEDURE — 87635 SARS-COV-2 COVID-19 AMP PRB: CPT

## 2022-09-11 PROCEDURE — 83605 ASSAY OF LACTIC ACID: CPT

## 2022-09-11 PROCEDURE — 99285 EMERGENCY DEPT VISIT HI MDM: CPT

## 2022-09-11 PROCEDURE — 36415 COLL VENOUS BLD VENIPUNCTURE: CPT

## 2022-09-11 PROCEDURE — 74018 RADEX ABDOMEN 1 VIEW: CPT

## 2022-09-11 PROCEDURE — 80048 BASIC METABOLIC PNL TOTAL CA: CPT

## 2022-09-11 PROCEDURE — 80053 COMPREHEN METABOLIC PANEL: CPT

## 2022-09-11 PROCEDURE — 96376 TX/PRO/DX INJ SAME DRUG ADON: CPT

## 2022-09-11 PROCEDURE — 83690 ASSAY OF LIPASE: CPT

## 2022-09-11 PROCEDURE — 85025 COMPLETE CBC W/AUTO DIFF WBC: CPT

## 2022-09-11 PROCEDURE — 96375 TX/PRO/DX INJ NEW DRUG ADDON: CPT

## 2022-09-11 PROCEDURE — 96374 THER/PROPH/DIAG INJ IV PUSH: CPT

## 2022-09-11 RX ADMIN — MIRTAZAPINE SCH MG: 15 TABLET, FILM COATED ORAL at 20:09

## 2022-09-11 RX ADMIN — FAMOTIDINE SCH MG: 20 TABLET, FILM COATED ORAL at 20:08

## 2022-09-11 RX ADMIN — HYDROMORPHONE HYDROCHLORIDE PRN MG: 1 INJECTION, SOLUTION INTRAMUSCULAR; INTRAVENOUS; SUBCUTANEOUS at 23:13

## 2022-09-11 RX ADMIN — METOPROLOL TARTRATE SCH MG: 25 TABLET, FILM COATED ORAL at 20:07

## 2022-09-11 RX ADMIN — HYDROMORPHONE HYDROCHLORIDE PRN MG: 1 INJECTION, SOLUTION INTRAMUSCULAR; INTRAVENOUS; SUBCUTANEOUS at 20:10

## 2022-09-11 RX ADMIN — CEFAZOLIN SCH MLS/HR: 330 INJECTION, POWDER, FOR SOLUTION INTRAMUSCULAR; INTRAVENOUS at 19:11

## 2022-09-11 NOTE — XR
EXAMINATION TYPE: XR KUB

 

DATE OF EXAM: 9/11/2022

 

COMPARISON: 8/7/2022

 

HISTORY: Abdominal pain

 

TECHNIQUE: 2 views upright

 

FINDINGS: There is posterior fusion surgery in the lower lumbar spine. There is no evidence of free a
ir. There is some gas-filled distended small bowel in the left upper quadrant. No pathologic calcific
ation seen over the kidneys.

 

IMPRESSION: Gas-distended small bowel could be ileus. No free air. This is a change compared to old e
se.

## 2022-09-11 NOTE — ED
Abdominal Pain HPI





- General


Chief Complaint: Abdominal Pain


Stated Complaint: abd pain, vomiting


Time Seen by Provider: 22 16:19


Source: patient, RN notes reviewed, old records reviewed


Mode of arrival: ambulatory


Limitations: no limitations





- History of Present Illness


Initial Comments: 





42-year-old male presents for his second visit this week for abdominal pain with

nausea after eating.  Patient does have history of hiatal hernia with gastric 

bypass that was done at [facility] by a Dr. Reed.  Patient states he's

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

Patient states his surgeon disagrees and told him there is nothing more he can 

do for him.  He does have an appointment scheduled with Dr. Chi .  

He states he is here for pain medication.  He denies any fevers.  He is nauseate

d but denies vomiting.


MD Complaint: abdominal pain


-: month(s) (7)


Location: diffuse


Severity scale (1-10): 10


Consistency: constant


Improves With: nothing


Worsens With: eating


Context: recent surgery/procedure (2022 gastric bypass Carola-en-Y [facility] Dr. Reed)


Associated Symptoms: nausea





- Related Data


                                Home Medications











 Medication  Instructions  Recorded  Confirmed


 


Metoprolol Tartrate [Lopressor] 12.5 mg PO BID 22


 


traZODone  mg PO HS 22


 


Mirtazapine 7.5 mg PO HS 22


 


Ondansetron Odt [Zofran Odt] 8 mg PO Q8H PRN 22


 


Aripiprazole Lauroxil [Aristada] 882 mg IM Q28D 22


 


Cyclobenzaprine [Flexeril] 10 mg PO HS PRN 22


 


Dicyclomine [Bentyl] 10 mg PO QID PRN 22


 


Metoclopramide [Reglan] 10 mg PO TID PRN 22








                                  Previous Rx's











 Medication  Instructions  Recorded


 


Famotidine [Pepcid] 20 mg PO BID #30 tablet 22











                                    Allergies











Allergy/AdvReac Type Severity Reaction Status Date / Time


 


latex Allergy  Rash/Hives Verified 22 14:25


 


meperidine [From Demerol] Allergy  Rash/Hives Verified 22 14:25














Review of Systems


ROS Statement: 


Those systems with pertinent positive or pertinent negative responses have been 

documented in the HPI.





ROS Other: All systems not noted in ROS Statement are negative.





Past Medical History


Past Medical History: Atrial Fibrillation, GERD/Reflux


Additional Past Medical History / Comment(s): Pt states difficulty with 

abdominal pain/nausea and vomiting when eating since surgery for hiatal 

hernia/peg tube, afib during [facility] hospitalization for carola en Y/peg tube surgery,

R shoulder dislocations, pt states recent EKG at Dr. Marvin's office showed MI at

some time, chronic low back pain


History of Any Multi-Drug Resistant Organisms: None Reported


Past Surgical History: Appendectomy, Back Surgery, Cholecystectomy, Hernia 

Repair, Orthopedic Surgery, Tonsillectomy


Additional Past Surgical History / Comment(s): Lysis of abdominal adhesions then

transfetted to [facility] for Carola en Y, peg tube insertion, EGD, diaphragmatic hernia 

repair, lumbar fusion, R shoulder arthroscopy, L shoulder fusion.


Past Anesthesia/Blood Transfusion Reactions: No Reported Reaction


Past Psychological History: No Psychological Hx Reported


Smoking Status: Never smoker


Past Alcohol Use History: None Reported


Past Drug Use History: None Reported





- Past Family History


  ** Mother


Additional Family Medical History / Comment(s): Mother is , she had 

lupus.





  ** Father


History Unknown: Yes





  ** Brother(s)


Additional Family Medical History / Comment(s): autoimmune disorder





General Exam


Limitations: no limitations


General appearance: alert, in no apparent distress


Head exam: Present: atraumatic


Eye exam: Present: normal appearance.  Absent: scleral icterus, conjunctival 

injection, periorbital swelling


Respiratory exam: Absent: respiratory distress, accessory muscle use


Cardiovascular Exam: Present: regular rate


GI/Abdominal exam: Present: soft, tenderness.  Absent: rigid


Extremities exam: Present: normal capillary refill


Neurological exam: Present: alert, oriented X3


Psychiatric exam: Present: normal affect, normal mood


Skin exam: Present: warm, dry, normal color.  Absent: cyanosis, diaphoretic, 

petechiae, pallor





Course


                                   Vital Signs











  22





  14:23 17:01 18:10


 


Temperature 98.6 F 100.3 F H 99.6 F


 


Pulse Rate 98 88 78


 


Respiratory 20 17 18





Rate   


 


Blood Pressure 125/77 147/103 151/110


 


O2 Sat by Pulse 99 100 98





Oximetry   














  22





  19:16


 


Temperature 98.8 F


 


Pulse Rate 77


 


Respiratory 20





Rate 


 


Blood Pressure 155/108


 


O2 Sat by Pulse 100





Oximetry 














Medical Decision Making





- Medical Decision Making





Patient was seen by me 2 days ago for same pain. Patient did undergo gastric 

bypass surgery 2022 at [facility] and is being evaluated for 

possible candycane syndrome.   


A CT scan was done on 22 that showed several small bowel loops mildly 

distended with no evidence of obstruction or free air.  Labs were unremarkable. 







KUB x-ray today shows no evidence of free air.  There is some gas-filled diste

nded small bowel in the left upper quadrant that could be an ileus.


Labs show no evidence of leukocytosis.  Hemoglobin and hematocrit are stable.  

Electrolytes are unremarkable.  No evidence of lactic acidosis.


Case discussed with Dr. Rodriguez, patient will be admitted with surgical consult. 

Patient is agreeable to this plan of care.





- Lab Data


Result diagrams: 


                                 22 16:53





                                 22 16:53


                                   Lab Results











  22 Range/Units





  16:53 16:53 16:53 


 


WBC  5.9    (3.8-10.6)  k/uL


 


RBC  4.49    (4.30-5.90)  m/uL


 


Hgb  12.9 L    (13.0-17.5)  gm/dL


 


Hct  41.3    (39.0-53.0)  %


 


MCV  91.8    (80.0-100.0)  fL


 


MCH  28.8    (25.0-35.0)  pg


 


MCHC  31.3    (31.0-37.0)  g/dL


 


RDW  14.5    (11.5-15.5)  %


 


Plt Count  278    (150-450)  k/uL


 


MPV  7.2    


 


Neutrophils %  67    %


 


Lymphocytes %  24    %


 


Monocytes %  6    %


 


Eosinophils %  1    %


 


Basophils %  1    %


 


Neutrophils #  3.9    (1.3-7.7)  k/uL


 


Lymphocytes #  1.4    (1.0-4.8)  k/uL


 


Monocytes #  0.4    (0-1.0)  k/uL


 


Eosinophils #  0.1    (0-0.7)  k/uL


 


Basophils #  0.0    (0-0.2)  k/uL


 


Sodium   138   (137-145)  mmol/L


 


Potassium   3.7   (3.5-5.1)  mmol/L


 


Chloride   103   ()  mmol/L


 


Carbon Dioxide   25   (22-30)  mmol/L


 


Anion Gap   10   mmol/L


 


BUN   4 L   (9-20)  mg/dL


 


Creatinine   0.61 L   (0.66-1.25)  mg/dL


 


Est GFR (CKD-EPI)AfAm   >90   (>60 ml/min/1.73 sqM)  


 


Est GFR (CKD-EPI)NonAf   >90   (>60 ml/min/1.73 sqM)  


 


Glucose   86   (74-99)  mg/dL


 


Plasma Lactic Acid Frankie    1.2  (0.7-2.0)  mmol/L


 


Calcium   8.8   (8.4-10.2)  mg/dL


 


Total Bilirubin   0.6   (0.2-1.3)  mg/dL


 


AST   26   (17-59)  U/L


 


ALT   90 H   (4-49)  U/L


 


Alkaline Phosphatase   153 H   ()  U/L


 


Total Protein   6.4   (6.3-8.2)  g/dL


 


Albumin   4.0   (3.5-5.0)  g/dL


 


Amylase   86   ()  U/L


 


Lipase   76   ()  U/L


 


Coronavirus (PCR)     (Not Detectd)  














  22 Range/Units





  18:00 


 


WBC   (3.8-10.6)  k/uL


 


RBC   (4.30-5.90)  m/uL


 


Hgb   (13.0-17.5)  gm/dL


 


Hct   (39.0-53.0)  %


 


MCV   (80.0-100.0)  fL


 


MCH   (25.0-35.0)  pg


 


MCHC   (31.0-37.0)  g/dL


 


RDW   (11.5-15.5)  %


 


Plt Count   (150-450)  k/uL


 


MPV   


 


Neutrophils %   %


 


Lymphocytes %   %


 


Monocytes %   %


 


Eosinophils %   %


 


Basophils %   %


 


Neutrophils #   (1.3-7.7)  k/uL


 


Lymphocytes #   (1.0-4.8)  k/uL


 


Monocytes #   (0-1.0)  k/uL


 


Eosinophils #   (0-0.7)  k/uL


 


Basophils #   (0-0.2)  k/uL


 


Sodium   (137-145)  mmol/L


 


Potassium   (3.5-5.1)  mmol/L


 


Chloride   ()  mmol/L


 


Carbon Dioxide   (22-30)  mmol/L


 


Anion Gap   mmol/L


 


BUN   (9-20)  mg/dL


 


Creatinine   (0.66-1.25)  mg/dL


 


Est GFR (CKD-EPI)AfAm   (>60 ml/min/1.73 sqM)  


 


Est GFR (CKD-EPI)NonAf   (>60 ml/min/1.73 sqM)  


 


Glucose   (74-99)  mg/dL


 


Plasma Lactic Acid Frankie   (0.7-2.0)  mmol/L


 


Calcium   (8.4-10.2)  mg/dL


 


Total Bilirubin   (0.2-1.3)  mg/dL


 


AST   (17-59)  U/L


 


ALT   (4-49)  U/L


 


Alkaline Phosphatase   ()  U/L


 


Total Protein   (6.3-8.2)  g/dL


 


Albumin   (3.5-5.0)  g/dL


 


Amylase   ()  U/L


 


Lipase   ()  U/L


 


Coronavirus (PCR)  Not Detected  (Not Detectd)  














Disposition


Clinical Impression: 


 Abdominal pain, Ileus





Disposition: ADMITTED AS IP TO THIS [facility]


Decision Date: 22


Decision Time: 18:47

## 2022-09-12 RX ADMIN — ONDANSETRON PRN MG: 2 INJECTION INTRAMUSCULAR; INTRAVENOUS at 06:14

## 2022-09-12 RX ADMIN — METOPROLOL TARTRATE SCH MG: 25 TABLET, FILM COATED ORAL at 08:10

## 2022-09-12 RX ADMIN — FAMOTIDINE SCH MG: 20 TABLET, FILM COATED ORAL at 21:45

## 2022-09-12 RX ADMIN — METOPROLOL TARTRATE SCH MG: 25 TABLET, FILM COATED ORAL at 21:45

## 2022-09-12 RX ADMIN — MIRTAZAPINE SCH MG: 15 TABLET, FILM COATED ORAL at 21:45

## 2022-09-12 RX ADMIN — HYDROMORPHONE HYDROCHLORIDE PRN MG: 1 INJECTION, SOLUTION INTRAMUSCULAR; INTRAVENOUS; SUBCUTANEOUS at 18:39

## 2022-09-12 RX ADMIN — HYDROMORPHONE HYDROCHLORIDE PRN MG: 1 INJECTION, SOLUTION INTRAMUSCULAR; INTRAVENOUS; SUBCUTANEOUS at 12:12

## 2022-09-12 RX ADMIN — HYDROMORPHONE HYDROCHLORIDE PRN MG: 1 INJECTION, SOLUTION INTRAMUSCULAR; INTRAVENOUS; SUBCUTANEOUS at 15:25

## 2022-09-12 RX ADMIN — METOCLOPRAMIDE PRN MG: 10 TABLET ORAL at 13:07

## 2022-09-12 RX ADMIN — HYDROMORPHONE HYDROCHLORIDE PRN MG: 1 INJECTION, SOLUTION INTRAMUSCULAR; INTRAVENOUS; SUBCUTANEOUS at 21:47

## 2022-09-12 RX ADMIN — FAMOTIDINE SCH MG: 20 TABLET, FILM COATED ORAL at 08:09

## 2022-09-12 RX ADMIN — HYDROMORPHONE HYDROCHLORIDE PRN MG: 1 INJECTION, SOLUTION INTRAMUSCULAR; INTRAVENOUS; SUBCUTANEOUS at 02:22

## 2022-09-12 RX ADMIN — METOCLOPRAMIDE PRN MG: 10 TABLET ORAL at 20:34

## 2022-09-12 RX ADMIN — ONDANSETRON PRN MG: 2 INJECTION INTRAMUSCULAR; INTRAVENOUS at 17:55

## 2022-09-12 RX ADMIN — CEFAZOLIN SCH MLS/HR: 330 INJECTION, POWDER, FOR SOLUTION INTRAMUSCULAR; INTRAVENOUS at 08:10

## 2022-09-12 RX ADMIN — HYDROMORPHONE HYDROCHLORIDE PRN MG: 1 INJECTION, SOLUTION INTRAMUSCULAR; INTRAVENOUS; SUBCUTANEOUS at 09:07

## 2022-09-12 RX ADMIN — HYDROMORPHONE HYDROCHLORIDE PRN MG: 1 INJECTION, SOLUTION INTRAMUSCULAR; INTRAVENOUS; SUBCUTANEOUS at 06:09

## 2022-09-12 RX ADMIN — CEFAZOLIN SCH MLS/HR: 330 INJECTION, POWDER, FOR SOLUTION INTRAMUSCULAR; INTRAVENOUS at 21:49

## 2022-09-12 NOTE — P.GSCN
History of Present Illness


Consult date: 22


History of present illness: 





CHIEF COMPLAINT: Abdominal pain with vomiting





HISTORY OF PRESENT ILLNESS: This is a 42-year-old male who presented to the 

hospital with complaints of abdominal pain mostly in the left upper abdomen 

where his had his previous feeding tube site.  Patient has prior abdominal 

surgeries.  His last surgery was a Carola-en-Y procedure at [facility] in 

2022 due to a hiatal hernia.  Also has had cholecystectomy and lysis of

adhesions.  Patient has had 5 ER visits due to this abdominal pain with 

vomiting.  He reports that since February surgery he has had chronic pain with 

nausea and vomiting.  He reports that lately the pain is just become very severe

and uncontrollable.  He is unable to eat.  He's had low-grade temp of 100.3.  

KUB x-ray did show evidence of ileus.  He was able to tolerate clear liquids 

this morning.  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 

[place].  Patient reports having regular bowel movements.





Discussed case and findings with Dr. berry





PAST MEDICAL HISTORY: 


Atrial Fibrillation, GERD/Reflux





PAST SURGICAL HISTORY: 


See list.





MEDICATIONS: 


See list.





ALLERGIES: 


See list.





SOCIAL HISTORY: No illicit drug use.  





REVIEW OF SYSTEMS: 


CONSTITUTIONAL: Denies fever or chills.


HEENT: Denies blurred vision, vision changes, or eye pain. Denies hemoptysis 


CARDIOVASCULAR: Denies chest pain or pressure.


RESPIRATORY: No shortness of breath. 


GASTROINTESTINAL: See HPI for pertinent findings


HEMATOLOGIC: Denies bleeding disorders.


GENITOURINARY:  Denies any blood in urine or increased urinary frequency.  


SKIN: Denies pruitis. Denies rash.





PHYSICAL EXAM: 


VITAL SIGNS: Reviewed


GENERAL: Well-developed in no acute distress. 


HEENT:  No sclera icterus. Extraocular movements grossly intact.  Moist buccal 

mucosa. Head is atraumatic, normocephalic. No nasal drainage.


ABDOMEN:  Soft. Nondistended.  Patient has midline scar.  Also scar to the left 

old PEG tube.  Patient is tender with palpation in that area.  No drainage 

noted.


NEUROLOGIC:  Alert and oriented.  Cranial nerves II through XII grossly intact.





LABORATORY DATA:








IMAGING:


KUB x-ray showing Gas distended small bowel could be ileus. no free air 


Computed tomography scan abdomen and pelvis from 2022 correlate for small 

bowel enteritis





ASSESSMENT: 


1.  Abdominal pain with nausea and vomiting


2.  History of Carola-en-Y surgery for hiatal hernia


3.  Multiple abdominal surgeries


4.  History of candycane syndrome





PLAN: 


-Discussed case and findings with Dr. berry.  Dr. Berry recommends that francine wilson be transferred to [facility] where he had his Carola-en-Y surgery in

February


-Continue clear liquid diet


-Continue IV fluids


-Continue supportive care





Thank you for this consultation





Physician Assistant note has been reviewed by physician. Signing provider agrees

with the documented findings, assessment, and plan of care. 





Past Medical History


Past Medical History: Atrial Fibrillation, GERD/Reflux


Additional Past Medical History / Comment(s): Pt states difficulty with 

abdominal pain/nausea and vomiting when eating since surgery for hiatal 

hernia/peg tube, afib during [facility] hospitalization for carola en Y/peg tube surgery,

R shoulder dislocations, pt states recent EKG at Dr. Marvin's office showed MI at

some time, chronic low back pain


History of Any Multi-Drug Resistant Organisms: None Reported


Past Surgical History: Appendectomy, Back Surgery, Cholecystectomy, Hernia 

Repair, Orthopedic Surgery, Tonsillectomy


Additional Past Surgical History / Comment(s): Lysis of abdominal adhesions then

transfetted to [facility] for Carola en Y, peg tube insertion, EGD, diaphragmatic hernia 

repair, lumbar fusion, R shoulder arthroscopy, L shoulder fusion.


Past Anesthesia/Blood Transfusion Reactions: No Reported Reaction


Past Psychological History: No Psychological Hx Reported


Smoking Status: Never smoker


Past Alcohol Use History: None Reported


Past Drug Use History: None Reported





- Past Family History


  ** Mother


Additional Family Medical History / Comment(s): Mother is , she had 

lupus.





  ** Father


History Unknown: Yes





  ** Brother(s)


Additional Family Medical History / Comment(s): autoimmune disorder





Medications and Allergies


                                Home Medications











 Medication  Instructions  Recorded  Confirmed  Type


 


Metoprolol Tartrate [Lopressor] 12.5 mg PO BID 22 History


 


traZODone  mg PO HS 22 History


 


Famotidine [Pepcid] 20 mg PO BID #30 tablet 22 Rx


 


Mirtazapine 7.5 mg PO HS 22 History


 


Ondansetron Odt [Zofran Odt] 8 mg PO Q8H PRN 22 History


 


Aripiprazole Lauroxil [Aristada] 882 mg IM Q28D 22 History


 


Cyclobenzaprine [Flexeril] 10 mg PO HS PRN 22 History


 


Dicyclomine [Bentyl] 10 mg PO QID PRN 22 History


 


Metoclopramide [Reglan] 10 mg PO TID PRN 22 History








                                    Allergies











Allergy/AdvReac Type Severity Reaction Status Date / Time


 


latex Allergy  Rash/Hives Verified 22 14:25


 


meperidine [From Demerol] Allergy  Rash/Hives Verified 22 14:25














Surgical - Exam


                                   Vital Signs











Temp Pulse Resp BP Pulse Ox


 


 98.6 F   98   20   125/77   99 


 


 22 14:23  22 14:23  22 14:23  22 14:23  22 14:23














Results





- Labs





                                 22 16:53





                                 22 16:53


                  Abnormal Lab Results - Last 24 Hours (Table)











  22 Range/Units





  16:53 16:53 


 


Hgb  12.9 L   (13.0-17.5)  gm/dL


 


BUN   4 L  (9-20)  mg/dL


 


Creatinine   0.61 L  (0.66-1.25)  mg/dL


 


ALT   90 H  (4-49)  U/L


 


Alkaline Phosphatase   153 H  ()  U/L








                                 Diabetes panel











  22 Range/Units





  16:53 


 


Sodium  138  (137-145)  mmol/L


 


Potassium  3.7  (3.5-5.1)  mmol/L


 


Chloride  103  ()  mmol/L


 


Carbon Dioxide  25  (22-30)  mmol/L


 


BUN  4 L  (9-20)  mg/dL


 


Creatinine  0.61 L  (0.66-1.25)  mg/dL


 


Glucose  86  (74-99)  mg/dL


 


Calcium  8.8  (8.4-10.2)  mg/dL


 


AST  26  (17-59)  U/L


 


ALT  90 H  (4-49)  U/L


 


Alkaline Phosphatase  153 H  ()  U/L


 


Total Protein  6.4  (6.3-8.2)  g/dL


 


Albumin  4.0  (3.5-5.0)  g/dL








                                  Calcium panel











  22 Range/Units





  16:53 


 


Calcium  8.8  (8.4-10.2)  mg/dL


 


Albumin  4.0  (3.5-5.0)  g/dL








                                 Pituitary panel











  22 Range/Units





  16:53 


 


Sodium  138  (137-145)  mmol/L


 


Potassium  3.7  (3.5-5.1)  mmol/L


 


Chloride  103  ()  mmol/L


 


Carbon Dioxide  25  (22-30)  mmol/L


 


BUN  4 L  (9-20)  mg/dL


 


Creatinine  0.61 L  (0.66-1.25)  mg/dL


 


Glucose  86  (74-99)  mg/dL


 


Calcium  8.8  (8.4-10.2)  mg/dL








                                  Adrenal panel











  22 Range/Units





  16:53 


 


Sodium  138  (137-145)  mmol/L


 


Potassium  3.7  (3.5-5.1)  mmol/L


 


Chloride  103  ()  mmol/L


 


Carbon Dioxide  25  (22-30)  mmol/L


 


BUN  4 L  (9-20)  mg/dL


 


Creatinine  0.61 L  (0.66-1.25)  mg/dL


 


Glucose  86  (74-99)  mg/dL


 


Calcium  8.8  (8.4-10.2)  mg/dL


 


Total Bilirubin  0.6  (0.2-1.3)  mg/dL


 


AST  26  (17-59)  U/L


 


ALT  90 H  (4-49)  U/L


 


Alkaline Phosphatase  153 H  ()  U/L


 


Total Protein  6.4  (6.3-8.2)  g/dL


 


Albumin  4.0  (3.5-5.0)  g/dL

## 2022-09-12 NOTE — P.CONS
History of Present Illness





- Reason for Consult


Consult date: 22


Abdominal pain, ileus


Requesting physician: Douglas Cabrera





- Chief Complaint


Abdominal pain





- History of Present Illness





This is a 42-year-old male who presented to the emergency department with 

complaints of abdominal pain, with nausea and vomiting after eating.  Patient 

states this has been a chronic problem since February.  He had a Al-en-Y 

surgery for large hiatal hernia with gastric bypass that was done at [facility] by Dr. Reed in February of this year.  He states since that time he 

has had continued abdominal pain with difficulty eating.  He states he's had a 

70 pound weight loss since the surgery.   He states that he had an EGD done 

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

and recommended to see a gastroenterologist.  He had not followed up with 

anybody through [place] due to travel difficulty.  States he has been out of 

work due to complications post surgery and his grandparents drive him to his 

appointments.  He states he does have appointment with Dr. Chi in October.  He

does state that he has difficulty with liquids and solids and often feels that 

he has to vomit or will go down.  He is on clear liquids currently and has not 

had any vomiting.  States he had a bowel movement yesterday, he usually has 

bowel movements every 3 days.  He had an x-ray of the abdomen that showed gas-

distended small bowel could be ileus.  No free air.  Gastroenterology was 

consulted for ileus.  She denies any previous colonoscopy.  Patient did have a 

low-grade temp on admission of 100.3.  Home meds include Bentyl 10 mg 4 times a 

day as needed as well as Pepcid 20 mg twice a day Reglan 10 mg by mouth 3 times 

a day as needed





Patient was seen in the emergency room with complaints of chest pain/abdominal 

pain on 2022.  At that time he had a CT of the abdomen and pelvis that 

stated correlate for small bowel enteritis.  The patient has been seen multiple 

times, looks like monthly since February in the emergency room and has had 

multiple CAT scans of the abdomen and pelvis as well as abdominal x-rays.





WBC 8.9 hemoglobin 12.9 hematocrit 41 platelet count 278,000 sodium 138 

potassium 3.7 BUN 4 creatinine 0.6 total bilirubin 0.6 AST 26 ALT 90 alkaline 

phosphatase 153 amylase 86 lipase 76





Review of Systems





REVIEW OF SYSTEMS:


CARDIOPULMONARY: No chest pain or shortness of breath.  


Gastrointestinal: Abdominal pain, left upper quadrant.  Nausea/vomiting, 

dysphagia with eating and drinking.  No hematemesis, coffee-ground emesis.  No 

rectal bleeding, or melena.


GENITOURINARY:  No dysuria or hematuria. 


MUSCULOSKELETAL: Reports normal range of motion.,  Joint pain.


SKIN: No rashes.  No jaundice.


ENDOCRINE: No chills, fevers.  No excessive weight gain or loss.  No polydipsia 

or polyuria.


PSYCHIATRIC: Unremarkable. 


NEUROLOGY: No change in mental status.  Denies dizziness, headache.


ENT: Vision unremarkable. 


CONSTITUTIONAL: Complaints of 70 pound weight loss since February.. No fever, 

chills, night sweats. 





Past Medical History


Past Medical History: Atrial Fibrillation, GERD/Reflux


Additional Past Medical History / Comment(s): Pt states difficulty with 

abdominal pain/nausea and vomiting when eating since surgery for hiatal 

hernia/peg tube, afib during [facility] hospitalization for al en Y/peg tube surgery,

R shoulder dislocations, pt states recent EKG at Dr. Marvin's office showed MI at

some time, chronic low back pain


History of Any Multi-Drug Resistant Organisms: None Reported


Past Surgical History: Appendectomy, Back Surgery, Cholecystectomy, Hernia 

Repair, Orthopedic Surgery, Tonsillectomy


Additional Past Surgical History / Comment(s): Lysis of abdominal adhesions then

transfetted to [facility] for Al en Y, peg tube insertion, EGD, diaphragmatic hernia 

repair, lumbar fusion, R shoulder arthroscopy, L shoulder fusion.


Past Anesthesia/Blood Transfusion Reactions: No Reported Reaction


Past Psychological History: No Psychological Hx Reported


Smoking Status: Never smoker


Past Alcohol Use History: None Reported


Past Drug Use History: None Reported





- Past Family History


  ** Mother


Additional Family Medical History / Comment(s): Mother is , she had 

lupus.





  ** Father


History Unknown: Yes





  ** Brother(s)


Additional Family Medical History / Comment(s): autoimmune disorder





Medications and Allergies


                                Home Medications











 Medication  Instructions  Recorded  Confirmed  Type


 


Metoprolol Tartrate [Lopressor] 12.5 mg PO BID 22 History


 


traZODone  mg PO HS 22 History


 


Famotidine [Pepcid] 20 mg PO BID #30 tablet 22 Rx


 


Mirtazapine 7.5 mg PO HS 22 History


 


Ondansetron Odt [Zofran Odt] 8 mg PO Q8H PRN 22 History


 


Aripiprazole Lauroxil [Aristada] 882 mg IM Q28D 22 History


 


Cyclobenzaprine [Flexeril] 10 mg PO HS PRN 22 History


 


Dicyclomine [Bentyl] 10 mg PO QID PRN 22 History


 


Metoclopramide [Reglan] 10 mg PO TID PRN 22 History








                                    Allergies











Allergy/AdvReac Type Severity Reaction Status Date / Time


 


latex Allergy  Rash/Hives Verified 22 14:25


 


meperidine [From Demerol] Allergy  Rash/Hives Verified 22 14:25














Physical Exam


Vitals: 


                                   Vital Signs











  Temp Pulse Pulse Resp BP BP Pulse Ox


 


 22 08:27  98 F  72   18  137/92   98


 


 22 02:00  97.9 F   70  18   150/108  98


 


 22 22:09  98.9 F  75   16    98


 


 22 20:11  99.8 F H      


 


 22 19:16  98.8 F  77   20  155/108   100


 


 22 18:10  99.6 F  78   18  151/110   98


 


 22 17:01  100.3 F H  88   17  147/103   100


 


 22 14:23  98.6 F  98   20  125/77   99








                                Intake and Output











 22





 22:59 06:59 14:59


 


Other:   


 


  # Voids  1 


 


  Weight  78.471 kg 














General appearance: The patient is alert, oriented, appears in no acute 

distress.


HET: Head is normocephalic and atraumatic.  Conjunctiva pink.  Sclera anicteric.


Neck: Supple without lymphadenopathy.  Trachea midline.


Heart: S1 S2.  Regular rate and rhythm.


Lungs: Clear to auscultation.


Abdomen: Soft, left upper quadrant tenderness, nondistended with  bowel sounds. 

No guarding or rigidity.


Skin:  No rashes. No jaundice.


Extremities: Normal skin color and turgor.  No pedal edema.


Neurological: No focal deficits.  Alert and oriented x3.





Results


CBC & Chem 7: 


                                 22 16:53





                                 22 16:53


Labs: 


                  Abnormal Lab Results - Last 24 Hours (Table)











  22 Range/Units





  16:53 16:53 


 


Hgb  12.9 L   (13.0-17.5)  gm/dL


 


BUN   4 L  (9-20)  mg/dL


 


Creatinine   0.61 L  (0.66-1.25)  mg/dL


 


ALT   90 H  (4-49)  U/L


 


Alkaline Phosphatase   153 H  ()  U/L











Abdominal x-ray: report reviewed (Gas-distended small bowel could be ileus.  No 

free air.  This is a change compared to old exams.)





Assessment and Plan


(1) Abdominal pain


Narrative/Plan: 


42-year-old male with a history of a large hiatal hernia who underwent Al-en-Y

gastric bypass surgery in February of this year done by Dr. Reed out of [facility].  Patient states since then he's had recurrent abdominal pain with 

difficulty eating.  He states he has recurrent nausea and vomiting following 

liquids or eating.  He complains of weight loss of approximately 70 pounds since

February.  He last followed up with Dr. Reed on had an EGD done about one sherry

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 

recommended to follow-up with a gastroenterologist.  Patient does have an 

appointment with Dr. Chi in October of this year.  He continues to have 

recurrent symptoms since surgery.  He has had multiple emergency room visits 

along with multiple CT of the abdomen and pelvis as well as abdominal x-rays.  

His most recent abdominal x-ray showing possible ileus.  He did have a CT of the

abdomen and pelvis done on 2022 when he was seen in the emergency room and

subsequently discharged.  At that time showed possible small bowel enteritis.  

No previous colonoscopy.  Gen. surgery is recommending transfer to [facility] to patient's known general surgeon who performed Al-en-Y gastric 

bypass, gastroenterology agrees with transfer.  No plan for an endoscopic 

evaluation due to patient's history of Al-en-Y bypass, recommend advance 

gastroenterology or general surgeon.


Current Visit: Yes   Status: Acute   Code(s): R10.9 - UNSPECIFIED ABDOMINAL PAIN

  SNOMED Code(s): 67907456


   





(2) Nausea and vomiting


Current Visit: Yes   Status: Acute   Code(s): R11.2 - NAUSEA WITH VOMITING, 

UNSPECIFIED   SNOMED Code(s): 53628428


   





(3) History of Al-en-Y gastric bypass


Current Visit: Yes   Status: Acute   Code(s): Z98.84 - BARIATRIC SURGERY STATUS 

 SNOMED Code(s): 897696562


   


Plan: 





1.  Continue symptomatic and supportive care


2.  Continue antiemetics


3.  Continue Pepcid 20 mg twice a day


4.  Please obtain records from [facility] for surgery as well as recent 

EGD


5.  Appreciate recommendations from general surgery


6.  Continue clear liquid diet


7.  No plans on endoscopic evaluation


8.  Recommend transfer to [facility] for further evaluation from 

patient's general surgeon and advanced gastroenterologist.  However it would be 

reasonable if patient's symptoms improve that patient be discharged home with 

outpatient follow-up.


Thank you for this consultation, we will continue to follow.





Dr. RAYO Chi


I agree with the dictator's note, documented as a scribe by Suzy BRIGGS.

## 2022-09-13 VITALS — TEMPERATURE: 98.5 F | HEART RATE: 67 BPM | SYSTOLIC BLOOD PRESSURE: 144 MMHG | DIASTOLIC BLOOD PRESSURE: 96 MMHG

## 2022-09-13 VITALS — RESPIRATION RATE: 16 BRPM

## 2022-09-13 LAB
ANION GAP SERPL CALC-SCNC: 12.8 MMOL/L (ref 10–18)
BASOPHILS # BLD AUTO: 0.03 X 10*3/UL (ref 0–0.1)
BASOPHILS NFR BLD AUTO: 0.8 %
BUN SERPL-SCNC: 3.4 MG/DL (ref 9–27)
BUN/CREAT SERPL: 4.86 RATIO (ref 12–20)
CALCIUM SPEC-MCNC: 8.9 MG/DL (ref 8.7–10.3)
CHLORIDE SERPL-SCNC: 101 MMOL/L (ref 96–109)
CO2 SERPL-SCNC: 25.2 MMOL/L (ref 20–27.5)
EOSINOPHIL # BLD AUTO: 0.05 X 10*3/UL (ref 0.04–0.35)
EOSINOPHIL NFR BLD AUTO: 1.4 %
ERYTHROCYTE [DISTWIDTH] IN BLOOD BY AUTOMATED COUNT: 4.22 X 10*6/UL (ref 4.4–5.6)
ERYTHROCYTE [DISTWIDTH] IN BLOOD: 14.4 % (ref 11.5–14.5)
GLUCOSE SERPL-MCNC: 74 MG/DL (ref 70–110)
HCT VFR BLD AUTO: 38.4 % (ref 39.6–50)
HGB BLD-MCNC: 12.4 G/DL (ref 13–17)
IMM GRANULOCYTES BLD QL AUTO: 0.3 %
LYMPHOCYTES # SPEC AUTO: 1.29 X 10*3/UL (ref 0.9–5)
LYMPHOCYTES NFR SPEC AUTO: 35.1 %
MCH RBC QN AUTO: 29.4 PG (ref 27–32)
MCHC RBC AUTO-ENTMCNC: 32.3 G/DL (ref 32–37)
MCV RBC AUTO: 91 FL (ref 80–97)
MONOCYTES # BLD AUTO: 0.46 X 10*3/UL (ref 0.2–1)
MONOCYTES NFR BLD AUTO: 12.5 %
NEUTROPHILS # BLD AUTO: 1.83 X 10*3/UL (ref 1.8–7.7)
NEUTROPHILS NFR BLD AUTO: 49.9 %
NRBC BLD AUTO-RTO: 0 /100 WBCS (ref 0–0)
PLATELET # BLD AUTO: 278 X 10*3/UL (ref 140–440)
POTASSIUM SERPL-SCNC: 3.6 MMOL/L (ref 3.5–5.5)
SODIUM SERPL-SCNC: 139 MMOL/L (ref 135–145)
WBC # BLD AUTO: 3.67 X 10*3/UL (ref 4.5–10)

## 2022-09-13 RX ADMIN — FAMOTIDINE SCH MG: 20 TABLET, FILM COATED ORAL at 09:25

## 2022-09-13 RX ADMIN — HYDROMORPHONE HYDROCHLORIDE PRN MG: 1 INJECTION, SOLUTION INTRAMUSCULAR; INTRAVENOUS; SUBCUTANEOUS at 16:11

## 2022-09-13 RX ADMIN — HYDROMORPHONE HYDROCHLORIDE PRN MG: 1 INJECTION, SOLUTION INTRAMUSCULAR; INTRAVENOUS; SUBCUTANEOUS at 01:29

## 2022-09-13 RX ADMIN — HYDROMORPHONE HYDROCHLORIDE PRN MG: 1 INJECTION, SOLUTION INTRAMUSCULAR; INTRAVENOUS; SUBCUTANEOUS at 05:50

## 2022-09-13 RX ADMIN — CEFAZOLIN SCH MLS/HR: 330 INJECTION, POWDER, FOR SOLUTION INTRAMUSCULAR; INTRAVENOUS at 01:30

## 2022-09-13 RX ADMIN — CEFAZOLIN SCH MLS/HR: 330 INJECTION, POWDER, FOR SOLUTION INTRAMUSCULAR; INTRAVENOUS at 05:49

## 2022-09-13 RX ADMIN — HYDROMORPHONE HYDROCHLORIDE PRN MG: 1 INJECTION, SOLUTION INTRAMUSCULAR; INTRAVENOUS; SUBCUTANEOUS at 12:42

## 2022-09-13 RX ADMIN — HYDROMORPHONE HYDROCHLORIDE PRN MG: 1 INJECTION, SOLUTION INTRAMUSCULAR; INTRAVENOUS; SUBCUTANEOUS at 18:21

## 2022-09-13 RX ADMIN — METOPROLOL TARTRATE SCH MG: 25 TABLET, FILM COATED ORAL at 09:25

## 2022-09-13 RX ADMIN — HYDROMORPHONE HYDROCHLORIDE PRN MG: 1 INJECTION, SOLUTION INTRAMUSCULAR; INTRAVENOUS; SUBCUTANEOUS at 08:59

## 2022-09-13 NOTE — P.HPIM
History of Present Illness


H&P Date: 22


Chief Complaint: Abdominal pain





Patient is a 42-year-old male with a known history of GERD, history of hiatal 

hernia surgery and Al-en-Y procedure done at [facility] in 

2022 due to hiatal hernia.  Also has history of cholecystectomy and 

lysis of additions.  Patient is has been coming to the hospital for the past 

recent 5 visits due to complaints of abdominal pain and vomiting.  Patient 

states that he has been having nausea and episodes of vomiting and abdominal 

pain since surgery.  Patient was previously transferred to [facility] and

could not see his surgeon at that time.  Patient was upset that he has to wait 

for 6 hours in the ER  there.


Patient was admitted to the hospital last night.  On admission T-max 100.3.  

Tachycardic with pulse.  98 and pulse ox 99% on room air.


Patient otherwise denied any complaints of chest pain or shortness of breath.  

No diarrhea.


Laboratory test showed WBC 5.9 hemoglobin 12.9 and platelets 278


Sodium 138 potassium 3.7 chloride 103 bicarb 25 BUN 14 creatinine 0.6


AST 26 ALT 19 alk phos 143 albumin 4.0 lipase 76.


Coronavirus PCR not detected.


KUB x-ray showed gas distended small bowel could be ileus.  No free air.








Review of Systems





Constitutional: Patient denies any fever or chills . no Generalized weakness.


Abdomen: Patient complains of nausea vomiting and abdominal pain.


Cardiovascular: Patient denies any chest pain or short of breath no 

palpitations.


Respiratory: patient denied any cough is from production.  No shortness of 

breath


Neurologic: Patient denied any numbness or tingling headache.


Musculoskeletal: Patient denies any complaints of joint swelling or deformity.


Skin: Negative


Psychiatric: Negative


Endocrine: No heat or cold intolerance.  No recent weight gain.


Genitourinary: No dysuria or hematuria.


All other 14 point ROS negative except the above








Past Medical History


Past Medical History: Atrial Fibrillation, GERD/Reflux


Additional Past Medical History / Comment(s): Pt states difficulty with 

abdominal pain/nausea and vomiting when eating since surgery for hiatal sravanthi

ia/peg tube, afib during [facility] hospitalization for al en Y/peg tube surgery, R 

shoulder dislocations, pt states recent EKG at Dr. Marvin's office showed MI at 

some time, chronic low back pain


History of Any Multi-Drug Resistant Organisms: None Reported


Past Surgical History: Appendectomy, Back Surgery, Cholecystectomy, Hernia 

Repair, Orthopedic Surgery, Tonsillectomy


Additional Past Surgical History / Comment(s): Lysis of abdominal adhesions then

transfetted to [facility] for Al en Y, peg tube insertion, EGD, diaphragmatic hernia 

repair, lumbar fusion, R shoulder arthroscopy, L shoulder fusion.


Past Anesthesia/Blood Transfusion Reactions: No Reported Reaction


Past Psychological History: No Psychological Hx Reported


Smoking Status: Never smoker


Past Alcohol Use History: None Reported


Past Drug Use History: None Reported





- Past Family History


  ** Mother


Additional Family Medical History / Comment(s): Mother is , she had 

lupus.





  ** Father


History Unknown: Yes





  ** Brother(s)


Additional Family Medical History / Comment(s): autoimmune disorder





Medications and Allergies


                                Home Medications











 Medication  Instructions  Recorded  Confirmed  Type


 


Metoprolol Tartrate [Lopressor] 12.5 mg PO BID 22 History


 


traZODone  mg PO HS 22 History


 


Famotidine [Pepcid] 20 mg PO BID #30 tablet 22 Rx


 


Mirtazapine 7.5 mg PO HS 22 History


 


Ondansetron Odt [Zofran Odt] 8 mg PO Q8H PRN 22 History


 


Aripiprazole Lauroxil [Aristada] 882 mg IM Q28D 22 History


 


Cyclobenzaprine [Flexeril] 10 mg PO HS PRN 22 History


 


Dicyclomine [Bentyl] 10 mg PO QID PRN 22 History


 


Metoclopramide [Reglan] 10 mg PO TID PRN 22 History








                                    Allergies











Allergy/AdvReac Type Severity Reaction Status Date / Time


 


latex Allergy  Rash/Hives Verified 22 14:25


 


meperidine [From Demerol] Allergy  Rash/Hives Verified 22 14:25














Physical Exam


Vitals: 


                                   Vital Signs











  Temp Pulse Pulse Resp BP BP Pulse Ox


 


 22 13:52  99.7 F H  101 H   19  166/84   100


 


 22 08:27  98 F  72   18  137/92   98


 


 22 08:00    70    


 


 22 02:00  97.9 F   70  18   150/108  98


 


 22 22:09  98.9 F  75   16    98


 


 22 20:11  99.8 F H      


 


 22 19:16  98.8 F  77   20  155/108   100


 


 22 18:10  99.6 F  78   18  151/110   98


 


 22 17:01  100.3 F H  88   17  147/103   100


 


 22 14:23  98.6 F  98   20  125/77   99








                                Intake and Output











 22





 22:59 06:59 14:59


 


Other:   


 


  # Voids  1 


 


  Weight  78.471 kg 














PHYSICAL EXAMINATION: 


Patient is lying in the bed comfortably, no acute distress, awake alert and 

oriented.. 


HEENT: Normocephalic. Neck is supple. Pupils reactive. Nostrils clear. Oral 

cavity is moist. 


Neck reveals no JVD, carotid bruits, or thyromegaly. 


CHEST EXAMINATION: Trachea is central. Symmetrical expansion. Lung fields clear 

to auscultation and percussion. 


CARDIAC: Normal S1, S2 with no gallops. No murmurs 


ABDOMEN: Soft. Bowel sounds present.  Nontender.  No guarding or rigidity.  No 

organomegaly. No abdominal bruits. 


Extremities: reveal no edema.  No clubbing or cyanosis


Neurologically awake, alert, oriented x3 with well-coordinated movements.  No 

focal deficits noted


Skin: No rash or skin lesions. 


Psychiatric: Coperative.  Nonsuicidal,, anxious.


Musculoskeletal: No joint swelling or deformity.  Normal range of motion.








Results


CBC & Chem 7: 


                                 22 16:53





                                 22 16:53


Labs: 


                  Abnormal Lab Results - Last 24 Hours (Table)











  22 Range/Units





  16:53 16:53 


 


Hgb  12.9 L   (13.0-17.5)  gm/dL


 


BUN   4 L  (9-20)  mg/dL


 


Creatinine   0.61 L  (0.66-1.25)  mg/dL


 


ALT   90 H  (4-49)  U/L


 


Alkaline Phosphatase   153 H  ()  U/L














Thrombosis Risk Factor Assmnt





- DVT/VTE Prophylaxis


DVT/VTE Prophylaxis: Pharmacologic Prophylaxis ordered





- Choose All That Apply


Any of the Below Risk Factors Present?: Yes


Each Factor Represents 1 point: Age 41-60 years, Obesity (BMI >25)


Other Risk Factors: No


Other congenital or acquired thrombophilia - If yes, enter type in comment: No


Thrombosis Risk Factor Assessment Total Risk Factor Score: 2


Thrombosis Risk Factor Assessment Level: Low Risk





Assessment and Plan


Assessment: 








Nausea vomiting and abdominal pain unable to tolerate oral diet


Ileus with gas distended small bowel


History of Al-en-Y procedure in [place]  due to hiatal hernia


History of PEG tube placement and reversal


History of cholecystectomy


History of multiple abdominal surgeries and lysis of additions.


History of candycane syndrome


History of atrial fibrillation post Al-en-Y procedure.  Currently in sinus 

rhythm.  Not on anticoagulation.


Chronic low back pain


Anxiety


GERD


DVT prophylaxis and GI prophylaxis





Plan:


Patient will be continued on symptomatic management for nausea and vomiting.  

Continue with dicyclomine, Pepcid and Reglan.


Pain management with Dilaudid IV and continue with metoprolol.


IV hydration.


Ellen has seen the patient and recommended to transfer the patient to [facility] where he had prior surgery.  Patient refused to be 

transferred.  We will continue symptomatic management and conservative therapy 

at this time and possible discharge if symptomatically improved.  GI is on remi

cate.





Time with Patient: Greater than 30

## 2022-09-13 NOTE — P.PN
Subjective


Progress Note Date: 09/13/22





CHIEF COMPLAINT: Abdominal pain and vomiting





HISTORY OF PRESENT ILLNESS: Patient reports that he is feeling about the same.  

He did have vomiting yesterday.  Abdominal pain remains the same.  He tried to e

at [employer]ell[employer] and had nausea.  He reports a diminished appetite.  He did have a 

loose bowel movement today.  They're working on transferring patient to [facility] where he has had his previous surgery and also a diagnosis of 

complicated pain syndrome.  Afebrile.  Currently on a clear liquid diet.  WBC 

3.67 Hgb 12.4


Patient seen and examined with Dr. berry








PHYSICAL EXAM: 


VITAL SIGNS: Reviewed.


GENERAL: Well-developed in no acute distress. 


HEENT:  No sclera icterus. Extraocular movements grossly intact.  Moist buccal 

mucosa. Head is atraumatic, normocephalic. 


ABDOMEN:  Soft.  Nondistended.  Tenderness left side abdomen near old PEG tube 

site


NEUROLOGIC: Alert and oriented. Cranial nerves II through XII grossly intact.





ASSESSMENT: 


1.  Abdominal pain with nausea and vomiting


2.  History of Carola-en-Y surgery for hiatal hernia


3.  Multiple abdominal surgeries


4.  History of candycane syndrome








PLAN: 


-Dr. Berry recommends that patient be transferred to [facility] where

he had his previous Carola-en-Y surgery in February of this year


-Continue supportive care 





Physician Assistant note has been reviewed by physician. Signing provider agrees

with the documented findings, assessment, and plan of care. 





Objective





- Vital Signs


Vital signs: 


                                   Vital Signs











Temp  98.5 F   09/13/22 12:40


 


Pulse  67   09/13/22 12:40


 


Resp  16   09/13/22 12:40


 


BP  144/96   09/13/22 12:40


 


Pulse Ox  97   09/13/22 12:40


 


FiO2      








                                 Intake & Output











 09/12/22 09/13/22 09/13/22





 18:59 06:59 18:59


 


Intake Total  900 


 


Balance  900 


 


Intake:   


 


  Intake, IV Titration  900 





  Amount   


 


    Sodium Chloride 0.9% 1,  900 





    000 ml @ 75 mls/hr IV .   





    [id number] [facility] Rx#:[id number]   














- Labs


CBC & Chem 7: 


                                 09/13/22 05:37





                                 09/13/22 05:37


Labs: 


                  Abnormal Lab Results - Last 24 Hours (Table)











  09/13/22 09/13/22 Range/Units





  05:37 05:37 


 


WBC  3.67 L   (4.50-10.00)  X 10*3/uL


 


RBC  4.22 L   (4.40-5.60)  X 10*6/uL


 


Hgb  12.4 L   (13.0-17.0)  g/dL


 


Hct  38.4 L   (39.6-50.0)  %


 


BUN   3.4 L  (9.0-27.0)  mg/dL


 


BUN/Creatinine Ratio   4.86 L  (12.00-20.00)  Ratio

## 2022-09-13 NOTE — P.PN
Subjective


Progress Note Date: 09/13/22





Patient is a 42-year-old male with a known history of GERD, history of hiatal 

hernia surgery and Carola-en-Y procedure done at [facility] in 

February 2022 due to hiatal hernia.  Also has history of cholecystectomy and 

lysis of additions.  Patient is has been coming to the hospital for the past 

recent 5 visits due to complaints of abdominal pain and vomiting.  Patient 

states that he has been having nausea and episodes of vomiting and abdominal 

pain since surgery.  Patient was previously transferred to [facility] and

could not see his surgeon at that time.  Patient was upset that he has to wait 

for 6 hours in the ER  there.


Patient was admitted to the hospital last night.  On admission T-max 100.3.  

Tachycardic with pulse.  98 and pulse ox 99% on room air.


Patient otherwise denied any complaints of chest pain or shortness of breath.  

No diarrhea.


Laboratory test showed WBC 5.9 hemoglobin 12.9 and platelets 278


Sodium 138 potassium 3.7 chloride 103 bicarb 25 BUN 14 creatinine 0.6


AST 26 ALT 19 alk phos 143 albumin 4.0 lipase 76.


Coronavirus PCR not detected.


KUB x-ray showed gas distended small bowel could be ileus.  No free air.





09/13/2022


Patient today is laying in bed, states his abdominal pain is 8/10. He also 

states he has had nausea this morning and last night. He did vomit twice this 

morning. No hematemesis noted. He is receiving dilaudid and states his pain only

improves to 5/10 after pain meds. hgb is stable at 12.4. Potassium is 3.6, BUN 

3.4 creatinine 0.7. He is being followed by GI and surgical services. Both 

consultations recommending transfer back to [facility] for further 

evaluation by his surgeon. Patient is agreeable for transfer. He has not 

followed with surgeon outpatient. Last seen him in April at [place].  For now 

he is being symptomatically treated with liquid diet and IV fluids as well as 

pain management and antiemetics. He is afebrile, blood pressure 115/76. 





Review of Systems





Constitutional: Denied any fatigue denied any fever.


Cardio vascular: denied any chest pain, palpitations


Gastrointestinal: denied any nausea, vomiting, diarrhea


Pulmonary: Denied any shortness of breath cough


Neurologic denied any new focal deficits





All inpatient medications were reviewed and appropriate changes in these 

medications as dictated in the interval history and assessment and plan.





PHYSICAL EXAMINATION: 





GENERAL: The patient is alert and oriented x3, has some facial grimacing. Well 

developed, well nourished. 


HEENT: Pupils are round and equally reacting to light. EOMI. No scleral icterus.

No conjunctival pallor. Normocephalic, atraumatic. No pharyngeal erythema. No 

thyromegaly. 


CARDIOVASCULAR: S1 and S2 present. No murmurs, rubs, or gallops. 


PULMONARY: Chest is clear to auscultation, no wheezing or crackles. 


ABDOMEN: Soft, tender, nondistended, normoactive bowel sounds. No palpable 

organomegaly. 


MUSCULOSKELETAL: No joint swelling or deformity.


EXTREMITIES: No cyanosis, clubbing, or pedal edema. 


NEUROLOGICAL: Gross neurological examination did not reveal any focal deficits. 


SKIN: No rashes. 





Assessment and Plan


Assessment


Nausea vomiting and abdominal pain unable to tolerate oral diet


Possible Ileus with gas distended small bowel patient is having bowel movements


History of Carola-en-Y procedure in 2022 secondary to hiatal hernia


History of PEG tube placement and reversal


History of cholecystectomy


History of multiple abdominal surgeries and lysis of additions.


History of candycane syndrome


History of atrial fibrillation post Carola-en-Y procedure.  Currently in sinus 

rhythm.  Not on anticoagulation.


Chronic low back pain


Anxiety


GERD


DVT prophylaxis and GI prophylaxis





Plan:


Patient will be continued on symptomatic management for nausea and vomiting.  

Continue with dicyclomine, Pepcid and Reglan. Patient is also on zofran.


Pain management with Dilaudid IV, added norco 5 every 6 hours.


IV hydration and clear liquid diet.


GI services as well as general surgery have evaluated the patient and 

recommending transfer to [facility] where patient had surgery. He is 

agreeable at this time. Continue with symptomatic care, pain management, 

antiemetics. Patient received a one time dose of benadryl for itching and also a

dose of xanax today. Case management has started the transfer process. 








The impression and plan of care has been dictated by Tomasa Gill Nurse 

Practitioner as directed.





Dr. Adria MD


I have performed a history and physical examination and medical decision making 

of this patient, discussed the same with the dictator, and agree with the dic

tators assessment and plan as written, documented as a scribe. Based on total 

visit time, I have performed more than 50% of this visit. 

















Objective





- Vital Signs


Vital signs: 


                                   Vital Signs











Temp  98.1 F   09/13/22 04:15


 


Pulse  76   09/13/22 10:26


 


Resp  16   09/13/22 09:01


 


BP  115/76   09/13/22 09:01


 


Pulse Ox  96   09/13/22 09:01


 


FiO2      








                                 Intake & Output











 09/12/22 09/13/22 09/13/22





 18:59 06:59 18:59


 


Intake Total  900 


 


Balance  900 


 


Intake:   


 


  Intake, IV Titration  900 





  Amount   


 


    Sodium Chloride 0.9% 1,  900 





    000 ml @ 75 mls/hr IV .   





    [id number] [facility] Rx#:[id number]   














- Labs


CBC & Chem 7: 


                                 09/13/22 05:37





                                 09/13/22 05:37


Labs: 


                  Abnormal Lab Results - Last 24 Hours (Table)











  09/13/22 09/13/22 Range/Units





  05:37 05:37 


 


WBC  3.67 L   (4.50-10.00)  X 10*3/uL


 


RBC  4.22 L   (4.40-5.60)  X 10*6/uL


 


Hgb  12.4 L   (13.0-17.0)  g/dL


 


Hct  38.4 L   (39.6-50.0)  %


 


BUN   3.4 L  (9.0-27.0)  mg/dL


 


BUN/Creatinine Ratio   4.86 L  (12.00-20.00)  Ratio














Assessment and Plan


Time with Patient: Less than 30

## 2022-09-13 NOTE — P.PN
Subjective


Progress Note Date: 09/13/22


Principal diagnosis: 





Abdominal pain, nausea vomiting





This is a 42-year-old male who presented to the emergency department with 

complaints of abdominal pain, with nausea and vomiting after eating.  Patient 

states this has been a chronic problem since February.  He had a Carola-en-Y 

surgery for large hiatal hernia with gastric bypass that was done at [facility] by Dr. Reed in February of this year.  He states since that time he 

has had continued abdominal pain with difficulty eating.  He states he's had a 

70 pound weight loss since the surgery.   He states that he had an EGD done 

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

and recommended to see a gastroenterologist.  He had not followed up with 

anybody through [place] due to travel difficulty.  States he has been out of 

work due to complications post surgery and his grandparents drive him to his 

appointments.  He states he does have appointment with Dr. Chi in October.  He

does state that he has difficulty with liquids and solids and often feels that 

he has to vomit or will go down.  He is on clear liquids currently and has not 

had any vomiting.  States he had a bowel movement yesterday, he usually has 

bowel movements every 3 days.  He had an x-ray of the abdomen that showed gas-

distended small bowel could be ileus.  No free air.  Gastroenterology was 

consulted for ileus.  She denies any previous colonoscopy.  Patient did have a 

low-grade temp on admission of 100.3.  Home meds include Bentyl 10 mg 4 times a 

day as needed as well as Pepcid 20 mg twice a day Reglan 10 mg by mouth 3 times 

a day as needed





Patient was seen in the emergency room with complaints of chest pain/abdominal 

pain on 09/09/2022.  At that time he had a CT of the abdomen and pelvis that 

stated correlate for small bowel enteritis.  The patient has been seen multiple 

times, looks like monthly since February in the emergency room and has had 

multiple CAT scans of the abdomen and pelvis as well as abdominal x-rays.





09/13/2022: Patient seen and examined as a follow-up.  He continues to state 

that he has abdominal pain and discomfort.  States that he is having some 

liquids and tolerating however does not feel like having popsicles and states he

does not tolerate carbonated bed beverages due to gastric bypass.  He is having 

some nausea, no vomiting at this time.  Patient states plan is to continue to 

transfer to [facility] where he had his previous surgery.





Objective





- Vital Signs


Vital signs: 


                                   Vital Signs











Temp  98.5 F   09/13/22 12:40


 


Pulse  67   09/13/22 12:40


 


Resp  16   09/13/22 12:40


 


BP  144/96   09/13/22 12:40


 


Pulse Ox  97   09/13/22 12:40


 


FiO2      








                                 Intake & Output











 09/12/22 09/13/22 09/13/22





 18:59 06:59 18:59


 


Intake Total  900 


 


Balance  900 


 


Intake:   


 


  Intake, IV Titration  900 





  Amount   


 


    Sodium Chloride 0.9% 1,  900 





    000 ml @ 75 mls/hr IV .   





    [id number] [facility] Rx#:[id number]   














- Exam





General appearance: The patient is alert, oriented, appears in no acute 

distress.


HET: Head is normocephalic and atraumatic.  Conjunctiva pink.  Sclera anicteric.


Neck: Supple without lymphadenopathy.  


Abdomen: Soft,  epigastric and right upper quadrant tenderness, nondistended 

with  bowel sounds.  No guarding or rigidity.


Extremities: Normal skin color and turgor.  No pedal edema


Skin: No rashes, no jaundice


Neurological: No focal deficits.  Alert and oriented -3.





- Labs


CBC & Chem 7: 


                                 09/13/22 05:37





                                 09/13/22 05:37


Labs: 


                  Abnormal Lab Results - Last 24 Hours (Table)











  09/13/22 09/13/22 Range/Units





  05:37 05:37 


 


WBC  3.67 L   (4.50-10.00)  X 10*3/uL


 


RBC  4.22 L   (4.40-5.60)  X 10*6/uL


 


Hgb  12.4 L   (13.0-17.0)  g/dL


 


Hct  38.4 L   (39.6-50.0)  %


 


BUN   3.4 L  (9.0-27.0)  mg/dL


 


BUN/Creatinine Ratio   4.86 L  (12.00-20.00)  Ratio














Assessment and Plan


(1) Abdominal pain


Narrative/Plan: 


42-year-old male with a history of a large hiatal hernia who underwent Carola-en-Y

gastric bypass surgery in February of this year done by Dr. Reed out of [facility].  Patient states since then he's had recurrent abdominal pain with 

difficulty eating.  He states he has recurrent nausea and vomiting following 

liquids or eating.  He complains of weight loss of approximately 70 pounds since

February.  He last followed up with Dr. Reed on had an EGD done about one 

month ago at [facility] and was told he had candy cane syndrome and 

recommended to follow-up with a gastroenterologist.  Patient does have an 

appointment with Dr. Chi in October of this year.  He continues to have 

recurrent symptoms since surgery.  He has had multiple emergency room visits 

along with multiple CT of the abdomen and pelvis as well as abdominal x-rays.  

His most recent abdominal x-ray showing possible ileus.  He did have a CT of the

abdomen and pelvis done on 09/09/2022 when he was seen in the emergency room and

subsequently discharged.  At that time showed possible small bowel enteritis.  

No previous colonoscopy.  Gen. surgery is recommending transfer to [facility] to patient's known general surgeon who performed Carola-en-Y gastric 

bypass, gastroenterology agrees with transfer.  No plan for an endoscopic 

evaluation due to patient's history of Carola-en-Y bypass, recommend advance 

gastroenterology or general surgeon.


Current Visit: Yes   Status: Acute   Code(s): R10.9 - UNSPECIFIED ABDOMINAL PAIN

  SNOMED Code(s): 70476027


   





(2) Nausea and vomiting


Current Visit: Yes   Status: Acute   Code(s): R11.2 - NAUSEA WITH VOMITING, 

UNSPECIFIED   SNOMED Code(s): 58384325


   





(3) History of Carola-en-Y gastric bypass


Current Visit: Yes   Status: Acute   Code(s): Z98.84 - BARIATRIC SURGERY STATUS 

 SNOMED Code(s): 577590331


   


Plan: 





1.  Continue symptomatic and supportive care


2.  Continue antiemetics


3.  Continue Pepcid 20 mg twice a day


4.  Please obtain records from [facility] for surgery as well as recent 

EGD


5.  Appreciate recommendations from general surgery


6.  Continue clear liquid diet


7.  No plans on endoscopic evaluation


8.  Recommend transfer to [facility] for further evaluation from 

patient's general surgeon and advanced gastroenterologist.  However it would be 

reasonable if patient's symptoms improve that patient be discharged home with 

outpatient follow-up.


Thank you for this consultation, we will continue to follow.





Dr. RAYO Chi


I agree with the dictator's note, documented as a scribe by Suzy BRIGGS.

## 2022-09-14 NOTE — P.DS
Providers


Date of admission: 


09/11/22 19:04





Attending physician: 


Heide Farfan





Consults: 





                                        





09/11/22 19:00


Consult Physician Routine 


   Consulting Provider: Eber Cruz


   Consult Reason/Comments: ileus


   Do you want consulting provider notified?: Yes, Notify in am


Consult Physician Routine 


   Consulting Provider: Dalia Chi


   Consult Reason/Comments: ileus


   Do you want consulting provider notified?: Yes, Notify in am











Primary care physician: 


Tyra Marvin





Hospital Course: 


Diagnosis


Nausea vomiting and abdominal pain unable to tolerate oral diet


Possible Ileus with gas distended small bowel patient is having bowel movements


History of Al-en-Y procedure in 2022 secondary to hiatal hernia


History of PEG tube placement and reversal


History of cholecystectomy


History of multiple abdominal surgeries and lysis of additions.


History of candycane syndrome


History of atrial fibrillation post Al-en-Y procedure.  Currently in sinus 

rhythm.  Not on anticoagulation.


Chronic low back pain


Anxiety


GERD


DVT prophylaxis and GI prophylaxis


Full Code








Patient transferred to [facility] to resume care under surgeon known to 

him. He was not evaluated today as he had been transferred in the evening of 

09/13/2022. 





Hospital Course


This is a 42 year old male with history of al en y procedure earlier this year

secondary to hiatal hernia, patient has also had multiple abdominal surgeries 

for lysis of adhesions. Patient follows with surgeon at [facility] who 

did his procedure and he has been diagnosed with candycane syndrome there. He 

has multiple admission for nasuea vomiting and abdominal pain that is 

intractible and difficult to treat. He has been receiving a combination of 

dilaudid, norco, bentyl, pepcid, reglan, zofran and continues with abdominal 

pain describes as sharp and cramp like. Patient had KUB showing gas distended 

small bowel possible ileus. He is having bowel movements. He also had abdominal 

pelvis CT on 09/11/2022 prior admission showing possible small bowel enteritis 

with hiatal hernia noted. He has been evaluated by GI services and General 

Surgery who are recommending to transfer patient to [facility] and patient has

agreed to transfer. 





Please see progress note for 09/13/2022 for additional information. 





Patient has not been evaluated today as he was transferred to [facility] on 

09/13/2022. Thank you for allowing us to participate in the care of this 

patient. 





The impression and plan of care has been dictated by Tomasa Gill, Nurse 

Practitioner as directed.





Dr. Adria MD


I have performed a history and physical examination and medical decision making 

of this patient, discussed the same with the dictator, and agree with the 

dictators assessment and plan as written, documented as a scribe. Based on total

visit time, I have performed more than 50% of this visit.  








Plan - Discharge Summary


Discharge Rx Participant: No


New Discharge Prescriptions: 


No Action


   Metoprolol Tartrate [Lopressor] 12.5 mg PO BID


   Famotidine [Pepcid] 20 mg PO BID #30 tablet


   Mirtazapine 7.5 mg PO HS


   Cyclobenzaprine [Flexeril] 10 mg PO HS PRN


     PRN Reason: Muscle Spasm


   Metoclopramide [Reglan] 10 mg PO TID PRN


     PRN Reason: Nausea


   Aripiprazole Lauroxil [Aristada] 882 mg IM Q28D


   Dicyclomine [Bentyl] 10 mg PO QID PRN


     PRN Reason: cramps


   traZODone  mg PO HS


   Ondansetron Odt [Zofran Odt] 8 mg PO Q8H PRN


     PRN Reason: Nausea


Discharge Medication List





Metoprolol Tartrate [Lopressor] 12.5 mg PO BID 08/04/22 [History]


traZODone  mg PO HS 08/04/22 [History]


Famotidine [Pepcid] 20 mg PO BID #30 tablet 08/07/22 [Rx]


Mirtazapine 7.5 mg PO HS 09/09/22 [History]


Ondansetron Odt [Zofran Odt] 8 mg PO Q8H PRN 09/09/22 [History]


Aripiprazole Lauroxil [Aristada] 882 mg IM Q28D 09/11/22 [History]


Cyclobenzaprine [Flexeril] 10 mg PO HS PRN 09/11/22 [History]


Dicyclomine [Bentyl] 10 mg PO QID PRN 09/11/22 [History]


Metoclopramide [Reglan] 10 mg PO TID PRN 09/11/22 [History]








Follow up Appointment(s)/Referral(s): 


Tyra Marvin MD [Primary Care Provider] - 1-2 days


Discharge/Stand Alone Forms:  Who Do I Call?, Community Resources


Discharge Disposition: TRANSFER TO SHORT TERM HOSP

## 2022-09-28 ENCOUNTER — HOSPITAL ENCOUNTER (EMERGENCY)
Dept: HOSPITAL 47 - EC | Age: 42
Discharge: HOME | End: 2022-09-28
Payer: COMMERCIAL

## 2022-09-28 VITALS — RESPIRATION RATE: 18 BRPM | HEART RATE: 84 BPM | DIASTOLIC BLOOD PRESSURE: 84 MMHG | SYSTOLIC BLOOD PRESSURE: 132 MMHG

## 2022-09-28 VITALS — TEMPERATURE: 98.1 F

## 2022-09-28 DIAGNOSIS — K21.9: ICD-10-CM

## 2022-09-28 DIAGNOSIS — G89.29: ICD-10-CM

## 2022-09-28 DIAGNOSIS — Z91.040: ICD-10-CM

## 2022-09-28 DIAGNOSIS — R10.12: Primary | ICD-10-CM

## 2022-09-28 DIAGNOSIS — Z98.84: ICD-10-CM

## 2022-09-28 DIAGNOSIS — Z88.5: ICD-10-CM

## 2022-09-28 LAB
ALBUMIN SERPL-MCNC: 3.8 G/DL (ref 3.5–5)
ALP SERPL-CCNC: 157 U/L (ref 38–126)
ALT SERPL-CCNC: 51 U/L (ref 4–49)
AMYLASE SERPL-CCNC: 61 U/L (ref 30–110)
ANION GAP SERPL CALC-SCNC: 9 MMOL/L
AST SERPL-CCNC: 26 U/L (ref 17–59)
BASOPHILS # BLD AUTO: 0 K/UL (ref 0–0.2)
BASOPHILS NFR BLD AUTO: 1 %
BUN SERPL-SCNC: 9 MG/DL (ref 9–20)
CALCIUM SPEC-MCNC: 8.8 MG/DL (ref 8.4–10.2)
CHLORIDE SERPL-SCNC: 101 MMOL/L (ref 98–107)
CO2 SERPL-SCNC: 29 MMOL/L (ref 22–30)
EOSINOPHIL # BLD AUTO: 0 K/UL (ref 0–0.7)
EOSINOPHIL NFR BLD AUTO: 1 %
ERYTHROCYTE [DISTWIDTH] IN BLOOD BY AUTOMATED COUNT: 4.28 M/UL (ref 4.3–5.9)
ERYTHROCYTE [DISTWIDTH] IN BLOOD: 14.1 % (ref 11.5–15.5)
GLUCOSE SERPL-MCNC: 83 MG/DL (ref 74–99)
HCT VFR BLD AUTO: 38.9 % (ref 39–53)
HGB BLD-MCNC: 12.7 GM/DL (ref 13–17.5)
LIPASE SERPL-CCNC: 86 U/L (ref 23–300)
LYMPHOCYTES # SPEC AUTO: 1.6 K/UL (ref 1–4.8)
LYMPHOCYTES NFR SPEC AUTO: 26 %
MCH RBC QN AUTO: 29.7 PG (ref 25–35)
MCHC RBC AUTO-ENTMCNC: 32.7 G/DL (ref 31–37)
MCV RBC AUTO: 90.9 FL (ref 80–100)
MONOCYTES # BLD AUTO: 0.4 K/UL (ref 0–1)
MONOCYTES NFR BLD AUTO: 6 %
NEUTROPHILS # BLD AUTO: 4 K/UL (ref 1.3–7.7)
NEUTROPHILS NFR BLD AUTO: 64 %
PLATELET # BLD AUTO: 282 K/UL (ref 150–450)
POTASSIUM SERPL-SCNC: 3.4 MMOL/L (ref 3.5–5.1)
PROT SERPL-MCNC: 6.1 G/DL (ref 6.3–8.2)
SODIUM SERPL-SCNC: 139 MMOL/L (ref 137–145)
WBC # BLD AUTO: 6.2 K/UL (ref 3.8–10.6)

## 2022-09-28 PROCEDURE — 83690 ASSAY OF LIPASE: CPT

## 2022-09-28 PROCEDURE — 96361 HYDRATE IV INFUSION ADD-ON: CPT

## 2022-09-28 PROCEDURE — 84484 ASSAY OF TROPONIN QUANT: CPT

## 2022-09-28 PROCEDURE — 99284 EMERGENCY DEPT VISIT MOD MDM: CPT

## 2022-09-28 PROCEDURE — 85025 COMPLETE CBC W/AUTO DIFF WBC: CPT

## 2022-09-28 PROCEDURE — 93005 ELECTROCARDIOGRAM TRACING: CPT

## 2022-09-28 PROCEDURE — 96374 THER/PROPH/DIAG INJ IV PUSH: CPT

## 2022-09-28 PROCEDURE — 36415 COLL VENOUS BLD VENIPUNCTURE: CPT

## 2022-09-28 PROCEDURE — 96375 TX/PRO/DX INJ NEW DRUG ADDON: CPT

## 2022-09-28 PROCEDURE — 82150 ASSAY OF AMYLASE: CPT

## 2022-09-28 PROCEDURE — 80053 COMPREHEN METABOLIC PANEL: CPT

## 2022-09-28 NOTE — ED
General Adult HPI





- General


Chief complaint: Chest Pain


Stated complaint: vomiting


Time Seen by Provider: 22 05:57


Source: patient, RN notes reviewed


Mode of arrival: ambulatory


Limitations: no limitations





- History of Present Illness


Initial comments: 


42-year-old male presents emergency from chief complaint abdominal pain.  This 

is chronic in nature.  Patient states he had gastric bypass surgery at [facility] 

states she's had pain ever since.  Patient has had multiple ER visits for same 

complaint.  Patient states started vomiting last night patient states that he is

not on current pain meds though he has filled prescriptions he said.  Patient 

was on prior chronic narcotic medication and is requesting Dilaudid by name.  

Patient states pain is left upper quadrant a denies any current chest pain 

shortness of breath fevers or chills no dysuria no hematuria.








- Related Data


                                Home Medications











 Medication  Instructions  Recorded  Confirmed


 


Metoprolol Tartrate [Lopressor] 12.5 mg PO BID 22


 


traZODone  mg PO HS 22


 


Mirtazapine 7.5 mg PO HS 22


 


Ondansetron Odt [Zofran Odt] 8 mg PO Q8H PRN 22


 


Aripiprazole Lauroxil [Aristada] 882 mg IM Q28D 22


 


Cyclobenzaprine [Flexeril] 10 mg PO HS PRN 22


 


Dicyclomine [Bentyl] 10 mg PO QID PRN 22


 


Metoclopramide [Reglan] 10 mg PO TID PRN 22








                                  Previous Rx's











 Medication  Instructions  Recorded


 


Famotidine [Pepcid] 20 mg PO BID #30 tablet 22


 


Ondansetron Odt [Zofran Odt] 4 mg PO Q8HR PRN #10 tab 22











                                    Allergies











Allergy/AdvReac Type Severity Reaction Status Date / Time


 


latex Allergy  Rash/Hives Verified 22 05:27


 


meperidine [From Demerol] Allergy  Rash/Hives Verified 22 05:27














Review of Systems


ROS Statement: 


Those systems with pertinent positive or pertinent negative responses have been 

documented in the HPI.





ROS Other: All systems not noted in ROS Statement are negative.





Past Medical History


Past Medical History: Atrial Fibrillation, GERD/Reflux


Additional Past Medical History / Comment(s): Pt states difficulty with 

abdominal pain/nausea and vomiting when eating since surgery for hiatal 

hernia/peg tube, afib during [facility] hospitalization for carola en Y/peg tube surgery,

R shoulder dislocations, pt states recent EKG at Dr. Marvin's office showed MI at

some time, chronic low back pain


History of Any Multi-Drug Resistant Organisms: None Reported


Past Surgical History: Appendectomy, Back Surgery, Cholecystectomy, Hernia 

Repair, Orthopedic Surgery, Tonsillectomy


Additional Past Surgical History / Comment(s): Lysis of abdominal adhesions then

transfetted to [facility] for Carola en Y, peg tube insertion, EGD, diaphragmatic hernia 

repair, lumbar fusion, R shoulder arthroscopy, L shoulder fusion.


Past Anesthesia/Blood Transfusion Reactions: No Reported Reaction


Past Psychological History: No Psychological Hx Reported


Smoking Status: Never smoker


Past Alcohol Use History: None Reported


Past Drug Use History: None Reported





- Past Family History


  ** Mother


Additional Family Medical History / Comment(s): Mother is , she had 

lupus.





  ** Father


History Unknown: Yes





  ** Brother(s)


Additional Family Medical History / Comment(s): autoimmune disorder





General Exam


General appearance: alert, in no apparent distress


Head exam: Present: atraumatic, normocephalic, normal inspection


Eye exam: Present: normal appearance, PERRL, EOMI.  Absent: scleral icterus, 

conjunctival injection, periorbital swelling


Respiratory exam: Present: normal lung sounds bilaterally.  Absent: respiratory 

distress, wheezes, rales, rhonchi, stridor


Cardiovascular Exam: Present: regular rate, normal rhythm, normal heart sounds. 

Absent: systolic murmur, diastolic murmur, rubs, gallop, clicks


GI/Abdominal exam: Present: soft, tenderness, normal bowel sounds.  Absent: 

distended, guarding, rebound, rigid


Back exam: Absent: CVA tenderness (R), CVA tenderness (L)





Course


                                   Vital Signs











  22





  05:23 06:30


 


Temperature 98.1 F 


 


Pulse Rate 98 


 


Pulse Rate [  78





Apical]  


 


Respiratory 20 





Rate  


 


Blood Pressure 128/84 


 


O2 Sat by Pulse 98 





Oximetry  














Medical Decision Making





- Medical Decision Making


42-year-old presented for abdominal pain.  This is chronic from gastric bypass 

surgery has seen a surgeon told him that having is fine though he continues to 

have pain.  Patient has had multiple prescriptions filled of recent.  Patient 

slept unremarkable be discharged advised to follow-up.








- Lab Data


Result diagrams: 


                                 22 06:30





                                 22 06:30


                                   Lab Results











  22 Range/Units





  06:30 06:30 06:30 


 


WBC  6.2    (3.8-10.6)  k/uL


 


RBC  4.28 L    (4.30-5.90)  m/uL


 


Hgb  12.7 L    (13.0-17.5)  gm/dL


 


Hct  38.9 L    (39.0-53.0)  %


 


MCV  90.9    (80.0-100.0)  fL


 


MCH  29.7    (25.0-35.0)  pg


 


MCHC  32.7    (31.0-37.0)  g/dL


 


RDW  14.1    (11.5-15.5)  %


 


Plt Count  282    (150-450)  k/uL


 


MPV  8.0    


 


Neutrophils %  64    %


 


Lymphocytes %  26    %


 


Monocytes %  6    %


 


Eosinophils %  1    %


 


Basophils %  1    %


 


Neutrophils #  4.0    (1.3-7.7)  k/uL


 


Lymphocytes #  1.6    (1.0-4.8)  k/uL


 


Monocytes #  0.4    (0-1.0)  k/uL


 


Eosinophils #  0.0    (0-0.7)  k/uL


 


Basophils #  0.0    (0-0.2)  k/uL


 


Sodium   139   (137-145)  mmol/L


 


Potassium   3.4 L   (3.5-5.1)  mmol/L


 


Chloride   101   ()  mmol/L


 


Carbon Dioxide   29   (22-30)  mmol/L


 


Anion Gap   9   mmol/L


 


BUN   9   (9-20)  mg/dL


 


Creatinine   0.82   (0.66-1.25)  mg/dL


 


Est GFR (CKD-EPI)AfAm   >90   (>60 ml/min/1.73 sqM)  


 


Est GFR (CKD-EPI)NonAf   >90   (>60 ml/min/1.73 sqM)  


 


Glucose   83   (74-99)  mg/dL


 


Calcium   8.8   (8.4-10.2)  mg/dL


 


Total Bilirubin   0.3   (0.2-1.3)  mg/dL


 


AST   26   (17-59)  U/L


 


ALT   51 H   (4-49)  U/L


 


Alkaline Phosphatase   157 H   ()  U/L


 


Troponin I    <0.012  (0.000-0.034)  ng/mL


 


Total Protein   6.1 L   (6.3-8.2)  g/dL


 


Albumin   3.8   (3.5-5.0)  g/dL


 


Amylase   61   ()  U/L


 


Lipase   86   ()  U/L














Disposition


Clinical Impression: 


 Chronic abdominal pain





Disposition: HOME SELF-CARE


Condition: Stable


Instructions (If sedation given, give patient instructions):  Abdominal Pain 

(ED)


Additional Instructions: 


Follow-up with your surgeon as directed.Please return to the Emergency 

Department if symptoms worsen or any other concerns.


Prescriptions: 


Ondansetron Odt [Zofran Odt] 4 mg PO Q8HR PRN #10 tab


 PRN Reason: Nausea


Is patient prescribed a controlled substance at d/c from ED?: No


Referrals: 


Tyra Marvin MD [Primary Care Provider] - 1-2 days


Time of Disposition: 08:41

## 2022-11-10 ENCOUNTER — HOSPITAL ENCOUNTER (EMERGENCY)
Dept: HOSPITAL 47 - EC | Age: 42
Discharge: HOME | End: 2022-11-10
Payer: COMMERCIAL

## 2022-11-10 VITALS — DIASTOLIC BLOOD PRESSURE: 93 MMHG | RESPIRATION RATE: 15 BRPM | HEART RATE: 87 BPM | SYSTOLIC BLOOD PRESSURE: 122 MMHG

## 2022-11-10 VITALS — TEMPERATURE: 98.2 F

## 2022-11-10 DIAGNOSIS — Z91.040: ICD-10-CM

## 2022-11-10 DIAGNOSIS — Z79.899: ICD-10-CM

## 2022-11-10 DIAGNOSIS — I48.91: ICD-10-CM

## 2022-11-10 DIAGNOSIS — Z88.5: ICD-10-CM

## 2022-11-10 DIAGNOSIS — R00.2: Primary | ICD-10-CM

## 2022-11-10 DIAGNOSIS — K21.9: ICD-10-CM

## 2022-11-10 LAB
ALBUMIN SERPL-MCNC: 4.4 G/DL (ref 3.5–5)
ALP SERPL-CCNC: 95 U/L (ref 38–126)
ALT SERPL-CCNC: 25 U/L (ref 4–49)
ANION GAP SERPL CALC-SCNC: 10 MMOL/L
APTT BLD: 18.3 SEC (ref 22–30)
AST SERPL-CCNC: 28 U/L (ref 17–59)
BASOPHILS # BLD AUTO: 0.1 K/UL (ref 0–0.2)
BASOPHILS NFR BLD AUTO: 1 %
BUN SERPL-SCNC: 16 MG/DL (ref 9–20)
CALCIUM SPEC-MCNC: 9.4 MG/DL (ref 8.4–10.2)
CHLORIDE SERPL-SCNC: 105 MMOL/L (ref 98–107)
CO2 SERPL-SCNC: 23 MMOL/L (ref 22–30)
EOSINOPHIL # BLD AUTO: 0.1 K/UL (ref 0–0.7)
EOSINOPHIL NFR BLD AUTO: 1 %
ERYTHROCYTE [DISTWIDTH] IN BLOOD BY AUTOMATED COUNT: 4.44 M/UL (ref 4.3–5.9)
ERYTHROCYTE [DISTWIDTH] IN BLOOD: 13.4 % (ref 11.5–15.5)
GLUCOSE BLD-MCNC: 79 MG/DL (ref 70–110)
GLUCOSE SERPL-MCNC: 96 MG/DL (ref 74–99)
HCT VFR BLD AUTO: 39.7 % (ref 39–53)
HGB BLD-MCNC: 13.7 GM/DL (ref 13–17.5)
INR PPP: 1 (ref ?–1.2)
LYMPHOCYTES # SPEC AUTO: 1.1 K/UL (ref 1–4.8)
LYMPHOCYTES NFR SPEC AUTO: 29 %
MAGNESIUM SPEC-SCNC: 2.2 MG/DL (ref 1.6–2.3)
MCH RBC QN AUTO: 30.8 PG (ref 25–35)
MCHC RBC AUTO-ENTMCNC: 34.4 G/DL (ref 31–37)
MCV RBC AUTO: 89.5 FL (ref 80–100)
MONOCYTES # BLD AUTO: 0.2 K/UL (ref 0–1)
MONOCYTES NFR BLD AUTO: 7 %
NEUTROPHILS # BLD AUTO: 2.2 K/UL (ref 1.3–7.7)
NEUTROPHILS NFR BLD AUTO: 60 %
PLATELET # BLD AUTO: 223 K/UL (ref 150–450)
POTASSIUM SERPL-SCNC: 4.3 MMOL/L (ref 3.5–5.1)
PROT SERPL-MCNC: 6.9 G/DL (ref 6.3–8.2)
PT BLD: 10.6 SEC (ref 9–12)
SODIUM SERPL-SCNC: 138 MMOL/L (ref 137–145)
WBC # BLD AUTO: 3.7 K/UL (ref 3.8–10.6)

## 2022-11-10 PROCEDURE — 36415 COLL VENOUS BLD VENIPUNCTURE: CPT

## 2022-11-10 PROCEDURE — 83735 ASSAY OF MAGNESIUM: CPT

## 2022-11-10 PROCEDURE — 85730 THROMBOPLASTIN TIME PARTIAL: CPT

## 2022-11-10 PROCEDURE — 96374 THER/PROPH/DIAG INJ IV PUSH: CPT

## 2022-11-10 PROCEDURE — 80053 COMPREHEN METABOLIC PANEL: CPT

## 2022-11-10 PROCEDURE — 96375 TX/PRO/DX INJ NEW DRUG ADDON: CPT

## 2022-11-10 PROCEDURE — 84484 ASSAY OF TROPONIN QUANT: CPT

## 2022-11-10 PROCEDURE — 96361 HYDRATE IV INFUSION ADD-ON: CPT

## 2022-11-10 PROCEDURE — 85610 PROTHROMBIN TIME: CPT

## 2022-11-10 PROCEDURE — 71046 X-RAY EXAM CHEST 2 VIEWS: CPT

## 2022-11-10 PROCEDURE — 93005 ELECTROCARDIOGRAM TRACING: CPT

## 2022-11-10 PROCEDURE — 85025 COMPLETE CBC W/AUTO DIFF WBC: CPT

## 2022-11-10 PROCEDURE — 99285 EMERGENCY DEPT VISIT HI MDM: CPT

## 2022-11-10 NOTE — XR
EXAMINATION TYPE: XR chest 2V

 

DATE OF EXAM: 11/10/2022

 

COMPARISON: Chest x-ray August 9, 2022

 

HISTORY: Dysrhythmia.

 

TECHNIQUE:  Frontal and lateral views of the chest are obtained.

 

FINDINGS:  Elevated left hemidiaphragm redemonstrated. Surgical clips project near left hilum on fron
ibrahima view with suspected scarring near this level, less well seen on lateral view. Right lung is clear
..  The cardiac silhouette size is stable and within normal limits. Surgical changes right humeral he
ad is partially imaged. Advanced degenerative change left glenohumeral joint redemonstrated.

 

IMPRESSION:  Chronic changes without acute pulmonary process.

## 2022-11-10 NOTE — ED
General Adult HPI





- General


Chief complaint: Arrhythmia/Palpitations


Stated complaint: Chest Pain, Arm Pain, Heart Palpitations,


Time Seen by Provider: 11/10/22 07:11


Source: patient


Mode of arrival: ambulatory


Limitations: no limitations





- History of Present Illness


Initial comments: 





42-year-old male with past medical history of A. fib, chronic abdominal pain 

status post Al-en-Y who presents to the emergency department with 

palpitations.  States that he has had palpitations since Tuesday and believes he

is in A. fib.  He takes metoprolol for rate control.  Denies being on a blood 

thinner.  He denies any chest pain.  Admits to associated nausea no recent 

medication changes.  Denies fevers or chills.  Patient does have chronic 

abdominal pain.  No history of coronary disease.  No other alleviating, 

precipitating or modifying factors





- Related Data


                                Home Medications











 Medication  Instructions  Recorded  Confirmed


 


Metoprolol Tartrate [Lopressor] 12.5 mg PO BID 08/04/22 11/10/22


 


traZODone  mg PO HS 08/04/22 11/10/22


 


Ondansetron Odt [Zofran Odt] 8 mg PO Q8H PRN 09/09/22 11/10/22


 


Cyclobenzaprine [Flexeril] 10 mg PO HS PRN 09/11/22 11/10/22


 


Dicyclomine [Bentyl] 10 mg PO QID PRN 09/11/22 11/10/22


 


Eszopiclone [Lunesta] 2 mg PO HS 11/10/22 11/10/22


 


Metoclopramide HCl [Reglan] 5 mg PO TID PRN 11/10/22 11/10/22


 


Omeprazole 20 mg PO DAILY 11/10/22 11/10/22











                                    Allergies











Allergy/AdvReac Type Severity Reaction Status Date / Time


 


latex Allergy  Rash/Hives Verified 11/10/22 09:08


 


meperidine [From Demerol] Allergy  Rash/Hives Verified 11/10/22 09:08














Review of Systems


ROS Statement: 


Those systems with pertinent positive or pertinent negative responses have been 

documented in the HPI.





ROS Other: All systems not noted in ROS Statement are negative.





Past Medical History


Past Medical History: Atrial Fibrillation, GERD/Reflux


Additional Past Medical History / Comment(s): Pt states difficulty with 

abdominal pain/nausea and vomiting when eating since surgery for hiatal 

hernia/peg tube, afib during [facility] hospitalization for al en Y/peg tube surgery,

R shoulder dislocations, pt states recent EKG at Dr. Marvin's office showed MI at

some time, chronic low back pain


History of Any Multi-Drug Resistant Organisms: None Reported


Past Surgical History: Appendectomy, Back Surgery, Cholecystectomy, Hernia 

Repair, Orthopedic Surgery, Tonsillectomy


Additional Past Surgical History / Comment(s): Lysis of abdominal adhesions then

transfetted to [facility] for Al en Y, peg tube insertion, EGD, diaphragmatic hernia 

repair, lumbar fusion, R shoulder arthroscopy, L shoulder fusion.


Past Anesthesia/Blood Transfusion Reactions: No Reported Reaction


Past Psychological History: No Psychological Hx Reported


Smoking Status: Never smoker


Past Alcohol Use History: None Reported


Past Drug Use History: None Reported





- Past Family History


  ** Mother


Additional Family Medical History / Comment(s): Mother is , she had 

lupus.





  ** Father


History Unknown: Yes





  ** Brother(s)


Additional Family Medical History / Comment(s): autoimmune disorder





General Exam


Limitations: no limitations


General appearance: alert, in no apparent distress


Head exam: Present: atraumatic, normocephalic, normal inspection


Eye exam: Present: normal appearance, PERRL, EOMI.  Absent: scleral icterus, 

conjunctival injection, periorbital swelling


ENT exam: Present: normal exam, mucous membranes moist


Neck exam: Present: normal inspection.  Absent: tenderness, meningismus, 

lymphadenopathy


Respiratory exam: Present: normal lung sounds bilaterally.  Absent: respiratory 

distress, wheezes, rales, rhonchi, stridor


Cardiovascular Exam: Present: regular rate, normal rhythm, normal heart sounds. 

Absent: systolic murmur, diastolic murmur, rubs, gallop, clicks


GI/Abdominal exam: Present: soft, normal bowel sounds.  Absent: distended, 

tenderness, guarding, rebound, rigid


Extremities exam: Present: normal inspection, full ROM, normal capillary refill.

 Absent: tenderness, pedal edema, joint swelling, calf tenderness


Back exam: Present: normal inspection


Neurological exam: Present: alert, oriented X3, CN II-XII intact


Psychiatric exam: Present: normal affect, normal mood


Skin exam: Present: warm, dry, intact, normal color.  Absent: rash





Course


                                   Vital Signs











  11/10/22 11/10/22 11/10/22





  07:08 08:52 09:28


 


Temperature 98.2 F  


 


Pulse Rate 84 72 


 


Pulse Rate [   73





Sitting]   


 


Pulse Rate [   84





Standing]   


 


Pulse Rate [   73





Supine]   


 


Respiratory 16 18 





Rate   


 


Blood Pressure 116/79 111/81 


 


Blood Pressure   118/90





[Sitting]   


 


Blood Pressure   122/91





[Standing]   


 


Blood Pressure   120/87





[Supine]   


 


O2 Sat by Pulse 98 100 





Oximetry   














  11/10/22





  10:46


 


Temperature 


 


Pulse Rate 87


 


Pulse Rate [ 





Sitting] 


 


Pulse Rate [ 





Standing] 


 


Pulse Rate [ 





Supine] 


 


Respiratory 15





Rate 


 


Blood Pressure 122/93


 


Blood Pressure 





[Sitting] 


 


Blood Pressure 





[Standing] 


 


Blood Pressure 





[Supine] 


 


O2 Sat by Pulse 99





Oximetry 














EKG Findings





- EKG Comments:


EKG Findings:: EKG was interpreted by myself.  Demonstrates a sinus rhythm with 

a rate of 83.  UT interval 183.  QRS 77.  QTC of 388.  Baseline artifact.  No 

acute ST segment elevations or depressions





Medical Decision Making





- Medical Decision Making





Upon arrival patient is placed in room 8.  Thorough history and physical exam is

 performed.  Patient placed on continuous pulse ox and cardiac monitoring.  An 

EKG is obtained.  IV is established and laboratory studies were conducted and 

reviewed.  Chest x-ray is performed.  Patient remains without signs of 

tachycardia or ectopy on cardiac monitor.  Patient requesting pain medications 

for his chronic pain.  Patient given 1 dose the results are discussed patient.  

Orthostatics were performed.  At this time the patient is stable for discharge 

home. Follow-up with cardiology associate monitoring.  Follow up with his 

surgeon for his chronic epigastric pain and return for any new or worsening 

symptoms.  Patient's  of the treatment plan and discharged home in stable 

condition





- Lab Data


Result diagrams: 


                                 11/10/22 07:36





                                 11/10/22 07:36


                                   Lab Results











  11/10/22 11/10/22 11/10/22 Range/Units





  07:36 07:36 07:36 


 


WBC  3.7 L    (3.8-10.6)  k/uL


 


RBC  4.44    (4.30-5.90)  m/uL


 


Hgb  13.7    (13.0-17.5)  gm/dL


 


Hct  39.7    (39.0-53.0)  %


 


MCV  89.5    (80.0-100.0)  fL


 


MCH  30.8    (25.0-35.0)  pg


 


MCHC  34.4    (31.0-37.0)  g/dL


 


RDW  13.4    (11.5-15.5)  %


 


Plt Count  223    (150-450)  k/uL


 


MPV  8.1    


 


Neutrophils %  60    %


 


Lymphocytes %  29    %


 


Monocytes %  7    %


 


Eosinophils %  1    %


 


Basophils %  1    %


 


Neutrophils #  2.2    (1.3-7.7)  k/uL


 


Lymphocytes #  1.1    (1.0-4.8)  k/uL


 


Monocytes #  0.2    (0-1.0)  k/uL


 


Eosinophils #  0.1    (0-0.7)  k/uL


 


Basophils #  0.1    (0-0.2)  k/uL


 


PT   10.6   (9.0-12.0)  sec


 


INR   1.0   (<1.2)  


 


APTT   18.3 L   (22.0-30.0)  sec


 


Sodium    138  (137-145)  mmol/L


 


Potassium    4.3  (3.5-5.1)  mmol/L


 


Chloride    105  ()  mmol/L


 


Carbon Dioxide    23  (22-30)  mmol/L


 


Anion Gap    10  mmol/L


 


BUN    16  (9-20)  mg/dL


 


Creatinine    0.71  (0.66-1.25)  mg/dL


 


Est GFR (CKD-EPI)AfAm    >90  (>60 ml/min/1.73 sqM)  


 


Est GFR (CKD-EPI)NonAf    >90  (>60 ml/min/1.73 sqM)  


 


Glucose    96  (74-99)  mg/dL


 


POC Glucose (mg/dL)     ()  mg/dL


 


POC Glu Operater ID     


 


Calcium    9.4  (8.4-10.2)  mg/dL


 


Magnesium    2.2  (1.6-2.3)  mg/dL


 


Total Bilirubin    0.5  (0.2-1.3)  mg/dL


 


AST    28  (17-59)  U/L


 


ALT    25  (4-49)  U/L


 


Alkaline Phosphatase    95  ()  U/L


 


Troponin I     (0.000-0.034)  ng/mL


 


Total Protein    6.9  (6.3-8.2)  g/dL


 


Albumin    4.4  (3.5-5.0)  g/dL














  11/10/22 11/10/22 Range/Units





  07:36 08:51 


 


WBC    (3.8-10.6)  k/uL


 


RBC    (4.30-5.90)  m/uL


 


Hgb    (13.0-17.5)  gm/dL


 


Hct    (39.0-53.0)  %


 


MCV    (80.0-100.0)  fL


 


MCH    (25.0-35.0)  pg


 


MCHC    (31.0-37.0)  g/dL


 


RDW    (11.5-15.5)  %


 


Plt Count    (150-450)  k/uL


 


MPV    


 


Neutrophils %    %


 


Lymphocytes %    %


 


Monocytes %    %


 


Eosinophils %    %


 


Basophils %    %


 


Neutrophils #    (1.3-7.7)  k/uL


 


Lymphocytes #    (1.0-4.8)  k/uL


 


Monocytes #    (0-1.0)  k/uL


 


Eosinophils #    (0-0.7)  k/uL


 


Basophils #    (0-0.2)  k/uL


 


PT    (9.0-12.0)  sec


 


INR    (<1.2)  


 


APTT    (22.0-30.0)  sec


 


Sodium    (137-145)  mmol/L


 


Potassium    (3.5-5.1)  mmol/L


 


Chloride    ()  mmol/L


 


Carbon Dioxide    (22-30)  mmol/L


 


Anion Gap    mmol/L


 


BUN    (9-20)  mg/dL


 


Creatinine    (0.66-1.25)  mg/dL


 


Est GFR (CKD-EPI)AfAm    (>60 ml/min/1.73 sqM)  


 


Est GFR (CKD-EPI)NonAf    (>60 ml/min/1.73 sqM)  


 


Glucose    (74-99)  mg/dL


 


POC Glucose (mg/dL)   79  ()  mg/dL


 


POC Glu Operater ID   Mosher, Ryan  


 


Calcium    (8.4-10.2)  mg/dL


 


Magnesium    (1.6-2.3)  mg/dL


 


Total Bilirubin    (0.2-1.3)  mg/dL


 


AST    (17-59)  U/L


 


ALT    (4-49)  U/L


 


Alkaline Phosphatase    ()  U/L


 


Troponin I  <0.012   (0.000-0.034)  ng/mL


 


Total Protein    (6.3-8.2)  g/dL


 


Albumin    (3.5-5.0)  g/dL














Disposition


Clinical Impression: 


 Chest pain, Palpitations





Disposition: HOME SELF-CARE


Condition: Stable


Instructions (If sedation given, give patient instructions):  Heart Palpitations

 (ED)


Additional Instructions: 


Call and make an appointment with the [facility].  You should have H

olter monitoring if you feel that you are going into A. fib.  Return for any new

 or worsening symptoms


Is patient prescribed a controlled substance at d/c from ED?: No


Referrals: 


Tyra Marvin MD [Primary Care Provider] - 1-2 days


[facility] [Provider Group] - 1-2 days


Time of Disposition: 09:27

## 2022-11-20 ENCOUNTER — HOSPITAL ENCOUNTER (EMERGENCY)
Dept: HOSPITAL 47 - EC | Age: 42
Discharge: HOME | End: 2022-11-20
Payer: COMMERCIAL

## 2022-11-20 VITALS — TEMPERATURE: 97.6 F

## 2022-11-20 VITALS — RESPIRATION RATE: 15 BRPM

## 2022-11-20 VITALS — HEART RATE: 66 BPM | SYSTOLIC BLOOD PRESSURE: 122 MMHG | DIASTOLIC BLOOD PRESSURE: 85 MMHG

## 2022-11-20 DIAGNOSIS — Z79.899: ICD-10-CM

## 2022-11-20 DIAGNOSIS — Z88.5: ICD-10-CM

## 2022-11-20 DIAGNOSIS — Z91.040: ICD-10-CM

## 2022-11-20 DIAGNOSIS — R07.89: Primary | ICD-10-CM

## 2022-11-20 DIAGNOSIS — I48.91: ICD-10-CM

## 2022-11-20 DIAGNOSIS — Z79.83: ICD-10-CM

## 2022-11-20 DIAGNOSIS — K21.9: ICD-10-CM

## 2022-11-20 LAB
ALBUMIN SERPL-MCNC: 4.4 G/DL (ref 3.5–5)
ALP SERPL-CCNC: 86 U/L (ref 38–126)
ALT SERPL-CCNC: 22 U/L (ref 4–49)
ANION GAP SERPL CALC-SCNC: 10 MMOL/L
APTT BLD: 18.7 SEC (ref 22–30)
AST SERPL-CCNC: 26 U/L (ref 17–59)
BASOPHILS # BLD AUTO: 0 K/UL (ref 0–0.2)
BASOPHILS NFR BLD AUTO: 1 %
BUN SERPL-SCNC: 10 MG/DL (ref 9–20)
CALCIUM SPEC-MCNC: 9.1 MG/DL (ref 8.4–10.2)
CHLORIDE SERPL-SCNC: 108 MMOL/L (ref 98–107)
CO2 SERPL-SCNC: 24 MMOL/L (ref 22–30)
EOSINOPHIL # BLD AUTO: 0 K/UL (ref 0–0.7)
EOSINOPHIL NFR BLD AUTO: 0 %
ERYTHROCYTE [DISTWIDTH] IN BLOOD BY AUTOMATED COUNT: 4.38 M/UL (ref 4.3–5.9)
ERYTHROCYTE [DISTWIDTH] IN BLOOD: 13.6 % (ref 11.5–15.5)
GLUCOSE SERPL-MCNC: 118 MG/DL (ref 74–99)
HCT VFR BLD AUTO: 40.1 % (ref 39–53)
HGB BLD-MCNC: 13.3 GM/DL (ref 13–17.5)
INR PPP: 1 (ref ?–1.2)
LYMPHOCYTES # SPEC AUTO: 1.2 K/UL (ref 1–4.8)
LYMPHOCYTES NFR SPEC AUTO: 28 %
MAGNESIUM SPEC-SCNC: 2.1 MG/DL (ref 1.6–2.3)
MCH RBC QN AUTO: 30.4 PG (ref 25–35)
MCHC RBC AUTO-ENTMCNC: 33.2 G/DL (ref 31–37)
MCV RBC AUTO: 91.7 FL (ref 80–100)
MONOCYTES # BLD AUTO: 0.3 K/UL (ref 0–1)
MONOCYTES NFR BLD AUTO: 6 %
NEUTROPHILS # BLD AUTO: 2.8 K/UL (ref 1.3–7.7)
NEUTROPHILS NFR BLD AUTO: 64 %
PLATELET # BLD AUTO: 248 K/UL (ref 150–450)
POTASSIUM SERPL-SCNC: 3.7 MMOL/L (ref 3.5–5.1)
PROT SERPL-MCNC: 6.8 G/DL (ref 6.3–8.2)
PT BLD: 10.9 SEC (ref 9–12)
SODIUM SERPL-SCNC: 142 MMOL/L (ref 137–145)
WBC # BLD AUTO: 4.4 K/UL (ref 3.8–10.6)

## 2022-11-20 PROCEDURE — 36415 COLL VENOUS BLD VENIPUNCTURE: CPT

## 2022-11-20 PROCEDURE — 85610 PROTHROMBIN TIME: CPT

## 2022-11-20 PROCEDURE — 85025 COMPLETE CBC W/AUTO DIFF WBC: CPT

## 2022-11-20 PROCEDURE — 96361 HYDRATE IV INFUSION ADD-ON: CPT

## 2022-11-20 PROCEDURE — 84484 ASSAY OF TROPONIN QUANT: CPT

## 2022-11-20 PROCEDURE — 85379 FIBRIN DEGRADATION QUANT: CPT

## 2022-11-20 PROCEDURE — 71046 X-RAY EXAM CHEST 2 VIEWS: CPT

## 2022-11-20 PROCEDURE — 80053 COMPREHEN METABOLIC PANEL: CPT

## 2022-11-20 PROCEDURE — 96374 THER/PROPH/DIAG INJ IV PUSH: CPT

## 2022-11-20 PROCEDURE — 83735 ASSAY OF MAGNESIUM: CPT

## 2022-11-20 PROCEDURE — 85730 THROMBOPLASTIN TIME PARTIAL: CPT

## 2022-11-20 PROCEDURE — 99285 EMERGENCY DEPT VISIT HI MDM: CPT

## 2022-11-20 PROCEDURE — 93005 ELECTROCARDIOGRAM TRACING: CPT

## 2022-11-20 NOTE — XR
EXAMINATION TYPE: XR chest 2V

 

DATE OF EXAM: 11/20/2022

 

COMPARISON: 11/10/2022

 

HISTORY: Chest pain

 

TECHNIQUE:

 

FINDINGS: Heart is normal. There is mild linear density left lower lobe. Right lung is clear. There i
s right shoulder prosthesis. There are clips in the left midlung field. There are chest leads. Bony t
horax is intact.

 

IMPRESSION: There is some scarring or atelectasis left lower lobe without change compared to old exam
.

## 2022-11-20 NOTE — ED
General Adult HPI





- General


Chief complaint: Chest Pain


Stated complaint: chest pain


Time Seen by Provider: 22 14:17


Source: patient, RN notes reviewed, old records reviewed


Mode of arrival: ambulatory


Limitations: no limitations





- History of Present Illness


Initial comments: 


42-year-old non-toxic appearing male presents to the emergency room with 

complaints of sharp, tight, pressure in his chest since he woke up this morning.

 States that it became worse at [employer] and he did not feel well. Denies any 

nausea vomiting diarrhea.  No cough.  No fevers.  States does have a history of 

A. fib, GERD, back pain, appendectomy, cholecystectomy, hernia repair with 

adhesions.  Patient was seen in the hospital earlier this month and directed to 

follow up with cardiology but states he was unable to get an appointment.  


-: hour(s)


Location: chest


Radiation: neck (jaw)


Severity scale (1-10): 10


Quality: constant, other (tight pressure)


Consistency: constant


Improves with: none


Associated Symptoms: denies other symptoms





- Related Data


                                Home Medications











 Medication  Instructions  Recorded  Confirmed


 


Metoprolol Tartrate [Lopressor] 12.5 mg PO BID 08/04/22 11/10/22


 


traZODone  mg PO HS 08/04/22 11/10/22


 


Ondansetron Odt [Zofran Odt] 8 mg PO Q8H PRN 09/09/22 11/10/22


 


Cyclobenzaprine [Flexeril] 10 mg PO HS PRN 09/11/22 11/10/22


 


Dicyclomine [Bentyl] 10 mg PO QID PRN 09/11/22 11/10/22


 


Eszopiclone [Lunesta] 2 mg PO HS 11/10/22 11/10/22


 


Metoclopramide HCl [Reglan] 5 mg PO TID PRN 11/10/22 11/10/22


 


Omeprazole 20 mg PO DAILY 11/10/22 11/10/22











                                    Allergies











Allergy/AdvReac Type Severity Reaction Status Date / Time


 


latex Allergy  Rash/Hives Verified 22 14:06


 


meperidine [From Demerol] Allergy  Rash/Hives Verified 22 14:06














Review of Systems


ROS Statement: 


Those systems with pertinent positive or pertinent negative responses have been 

documented in the HPI.





ROS Other: All systems not noted in ROS Statement are negative.





Past Medical History


Past Medical History: Atrial Fibrillation, GERD/Reflux


Additional Past Medical History / Comment(s): Pt states difficulty with 

abdominal pain/nausea and vomiting when eating since surgery for hiatal 

hernia/peg tube, afib during [facility] hospitalization for al en Y/peg tube surgery,

R shoulder dislocations, pt states recent EKG at Dr. Marvin's office showed MI at

some time, chronic low back pain


History of Any Multi-Drug Resistant Organisms: None Reported


Past Surgical History: Appendectomy, Back Surgery, Cholecystectomy, Hernia 

Repair, Orthopedic Surgery, Tonsillectomy


Additional Past Surgical History / Comment(s): Lysis of abdominal adhesions then

transfetted to [facility] for Al en Y, peg tube insertion, EGD, diaphragmatic hernia 

repair, lumbar fusion, R shoulder arthroscopy, L shoulder fusion.


Past Anesthesia/Blood Transfusion Reactions: No Reported Reaction


Past Psychological History: No Psychological Hx Reported


Smoking Status: Never smoker


Past Alcohol Use History: None Reported


Past Drug Use History: None Reported





- Past Family History


  ** Mother


Additional Family Medical History / Comment(s): Mother is , she had 

lupus.





  ** Father


History Unknown: Yes





  ** Brother(s)


Additional Family Medical History / Comment(s): autoimmune disorder





General Exam


Limitations: no limitations


General appearance: alert, in no apparent distress


Head exam: Present: atraumatic, normocephalic


Eye exam: Absent: scleral icterus, conjunctival injection, periorbital swelling


ENT exam: Present: mucous membranes moist


Neck exam: Absent: meningismus


Respiratory exam: Present: normal lung sounds bilaterally.  Absent: respiratory 

distress, wheezes, rales, rhonchi, stridor, chest wall tenderness, accessory 

muscle use


Cardiovascular Exam: Present: tachycardia


GI/Abdominal exam: Present: soft.  Absent: distended, tenderness, rigid


Extremities exam: Present: normal capillary refill.  Absent: pedal edema


Back exam: Present: normal inspection.  Absent: CVA tenderness (R), CVA tende

rness (L), rash noted


Neurological exam: Present: alert, oriented X3


Psychiatric exam: Present: normal affect, normal mood


Skin exam: Present: warm, dry, normal color.  Absent: cyanosis, diaphoretic, 

petechiae, pallor





Course


                                   Vital Signs











  22





  14:03 15:16 16:27


 


Temperature 97.6 F  


 


Pulse Rate 124 H 84 61


 


Respiratory 20 15 15





Rate   


 


Blood Pressure 153/77 113/79 107/63


 


O2 Sat by Pulse 96 100 100





Oximetry   














EKG Findings





- EKG Results:


EKG: sinus rhythm


EKG shows: tachycardia (Ventricular rate 105, HI interval 0.179, QRS 0.88, QTc 

0.410; normal axis)





Medical Decision Making





- Medical Decision Making





Patient presents with chest pain that started this morning and became worse 

while he was at [employer].  He states has history of atrial fibrillation is not 

taking any blood thinners.  


He was seen for same 11/10/22 and directed to follow up with cardiology on an 

outpatient basis.  Patient states unable to get an appointment.  





Chest x-ray interpreted by me shows no evidence of focal consolidation.  No 

cardiomegaly.  Radiologist interpretation is scarring or atelectasis left lower 

lobe without change compared to old exam 10 days ago dated November 10 of this 

year.


Labs show no evidence of leukocytosis.  Hemoglobin and hematocrit are stable.  

Electrolytes are unremarkable. D-dimer is negative at 0.29. 


EKG shows sinus tachycardia with a rate of 105.  No ST elevation Q-wave 

inversions.  Troponin 0.012. 





Patient was given aspirin and sublingual nitro with no pain relief.  He was give

n Toradol for continued pain along with IV fluids.  Vital signs are stable at 

discharge no tachycardia, no evidence of atrial fibrillation.





Patient has been seen multiple times for chest pain.  He was worked up in 

consult to cardiology was done .  He did have an echocardiogram on 

 tissue showing ejection fraction of 60-65%.  


According to cardiology note 2022, patient was on a 30 day event monitor 

in July and stated was called by the company telling him he was having atrial 

fibrillation and significant tachycardia and directed to come to the emergency 

room at that time.  Telemetry tracings from the event monitor did not reveal any

atrial fibrillation.  There is no EKG on file showing history of atrial 

fibrillation.





He'll be directed to follow up with cardiology as recommended.  Patient is 

agreeable to this plan of care.





Case discussed with Dr. Velazquez





- Lab Data


Result diagrams: 


                                 22 15:10





                                 22 15:10


                                   Lab Results











  22 Range/Units





  15:10 15:10 15:10 


 


WBC  4.4    (3.8-10.6)  k/uL


 


RBC  4.38    (4.30-5.90)  m/uL


 


Hgb  13.3    (13.0-17.5)  gm/dL


 


Hct  40.1    (39.0-53.0)  %


 


MCV  91.7    (80.0-100.0)  fL


 


MCH  30.4    (25.0-35.0)  pg


 


MCHC  33.2    (31.0-37.0)  g/dL


 


RDW  13.6    (11.5-15.5)  %


 


Plt Count  248    (150-450)  k/uL


 


MPV  8.3    


 


Neutrophils %  64    %


 


Lymphocytes %  28    %


 


Monocytes %  6    %


 


Eosinophils %  0    %


 


Basophils %  1    %


 


Neutrophils #  2.8    (1.3-7.7)  k/uL


 


Lymphocytes #  1.2    (1.0-4.8)  k/uL


 


Monocytes #  0.3    (0-1.0)  k/uL


 


Eosinophils #  0.0    (0-0.7)  k/uL


 


Basophils #  0.0    (0-0.2)  k/uL


 


PT   10.9   (9.0-12.0)  sec


 


INR   1.0   (<1.2)  


 


APTT   18.7 L   (22.0-30.0)  sec


 


D-Dimer   0.29   (<0.60)  mg/L FEU


 


Sodium    142  (137-145)  mmol/L


 


Potassium    3.7  (3.5-5.1)  mmol/L


 


Chloride    108 H  ()  mmol/L


 


Carbon Dioxide    24  (22-30)  mmol/L


 


Anion Gap    10  mmol/L


 


BUN    10  (9-20)  mg/dL


 


Creatinine    0.64 L  (0.66-1.25)  mg/dL


 


Est GFR (CKD-EPI)AfAm    >90  (>60 ml/min/1.73 sqM)  


 


Est GFR (CKD-EPI)NonAf    >90  (>60 ml/min/1.73 sqM)  


 


Glucose    118 H  (74-99)  mg/dL


 


Calcium    9.1  (8.4-10.2)  mg/dL


 


Magnesium    2.1  (1.6-2.3)  mg/dL


 


Total Bilirubin    0.3  (0.2-1.3)  mg/dL


 


AST    26  (17-59)  U/L


 


ALT    22  (4-49)  U/L


 


Alkaline Phosphatase    86  ()  U/L


 


Troponin I     (0.000-0.034)  ng/mL


 


Total Protein    6.8  (6.3-8.2)  g/dL


 


Albumin    4.4  (3.5-5.0)  g/dL














  22 Range/Units





  15:10 


 


WBC   (3.8-10.6)  k/uL


 


RBC   (4.30-5.90)  m/uL


 


Hgb   (13.0-17.5)  gm/dL


 


Hct   (39.0-53.0)  %


 


MCV   (80.0-100.0)  fL


 


MCH   (25.0-35.0)  pg


 


MCHC   (31.0-37.0)  g/dL


 


RDW   (11.5-15.5)  %


 


Plt Count   (150-450)  k/uL


 


MPV   


 


Neutrophils %   %


 


Lymphocytes %   %


 


Monocytes %   %


 


Eosinophils %   %


 


Basophils %   %


 


Neutrophils #   (1.3-7.7)  k/uL


 


Lymphocytes #   (1.0-4.8)  k/uL


 


Monocytes #   (0-1.0)  k/uL


 


Eosinophils #   (0-0.7)  k/uL


 


Basophils #   (0-0.2)  k/uL


 


PT   (9.0-12.0)  sec


 


INR   (<1.2)  


 


APTT   (22.0-30.0)  sec


 


D-Dimer   (<0.60)  mg/L FEU


 


Sodium   (137-145)  mmol/L


 


Potassium   (3.5-5.1)  mmol/L


 


Chloride   ()  mmol/L


 


Carbon Dioxide   (22-30)  mmol/L


 


Anion Gap   mmol/L


 


BUN   (9-20)  mg/dL


 


Creatinine   (0.66-1.25)  mg/dL


 


Est GFR (CKD-EPI)AfAm   (>60 ml/min/1.73 sqM)  


 


Est GFR (CKD-EPI)NonAf   (>60 ml/min/1.73 sqM)  


 


Glucose   (74-99)  mg/dL


 


Calcium   (8.4-10.2)  mg/dL


 


Magnesium   (1.6-2.3)  mg/dL


 


Total Bilirubin   (0.2-1.3)  mg/dL


 


AST   (17-59)  U/L


 


ALT   (4-49)  U/L


 


Alkaline Phosphatase   ()  U/L


 


Troponin I  <0.012  (0.000-0.034)  ng/mL


 


Total Protein   (6.3-8.2)  g/dL


 


Albumin   (3.5-5.0)  g/dL














Disposition


Clinical Impression: 


 Atypical chest pain





Disposition: HOME SELF-CARE


Condition: Good


Instructions (If sedation given, give patient instructions):  Chest Pain (ED)


Additional Instructions: 


Today we completed a workup for your chest pain.  Your EKG shows no acute 

changes and your labs show no area of concern.  Your chest x-ray is clear.  


Continue your previously prescribed medications.  Follow up with cardiology as 

recommended at your last emergency room visit.  Return to the emergency room 

with any new or concerning symptoms.


Is patient prescribed a controlled substance at d/c from ED?: No


Referrals: 


Tyra Marvin MD [Primary Care Provider] - 1-2 days


Lester Mehta MD [STAFF PHYSICIAN] - 1-2 days


Time of Disposition: 16:27

## 2023-03-20 ENCOUNTER — HOSPITAL ENCOUNTER (EMERGENCY)
Dept: HOSPITAL 47 - EC | Age: 43
Discharge: HOME | End: 2023-03-20
Payer: COMMERCIAL

## 2023-03-20 ENCOUNTER — HOSPITAL ENCOUNTER (EMERGENCY)
Dept: HOSPITAL 47 - EC | Age: 43
LOS: 1 days | Discharge: OTHER INSTITUTION | End: 2023-03-21
Payer: COMMERCIAL

## 2023-03-20 VITALS — SYSTOLIC BLOOD PRESSURE: 151 MMHG | DIASTOLIC BLOOD PRESSURE: 107 MMHG | HEART RATE: 50 BPM

## 2023-03-20 VITALS — RESPIRATION RATE: 18 BRPM

## 2023-03-20 VITALS — SYSTOLIC BLOOD PRESSURE: 152 MMHG | HEART RATE: 84 BPM | TEMPERATURE: 98.6 F | DIASTOLIC BLOOD PRESSURE: 86 MMHG

## 2023-03-20 VITALS — RESPIRATION RATE: 18 BRPM | TEMPERATURE: 98.9 F

## 2023-03-20 DIAGNOSIS — G89.18: Primary | ICD-10-CM

## 2023-03-20 DIAGNOSIS — Z79.899: ICD-10-CM

## 2023-03-20 DIAGNOSIS — K21.9: ICD-10-CM

## 2023-03-20 DIAGNOSIS — R11.2: ICD-10-CM

## 2023-03-20 DIAGNOSIS — I10: ICD-10-CM

## 2023-03-20 DIAGNOSIS — Z91.040: ICD-10-CM

## 2023-03-20 DIAGNOSIS — Z88.5: ICD-10-CM

## 2023-03-20 DIAGNOSIS — K22.2: Primary | ICD-10-CM

## 2023-03-20 DIAGNOSIS — I48.91: ICD-10-CM

## 2023-03-20 DIAGNOSIS — Z90.49: ICD-10-CM

## 2023-03-20 LAB
ALBUMIN SERPL-MCNC: 4.7 G/DL (ref 3.5–5)
ALP SERPL-CCNC: 95 U/L (ref 38–126)
ALT SERPL-CCNC: 19 U/L (ref 4–49)
ANION GAP SERPL CALC-SCNC: 9 MMOL/L
APTT BLD: 23.4 SEC (ref 22–30)
AST SERPL-CCNC: 22 U/L (ref 17–59)
BASOPHILS # BLD AUTO: 0 K/UL (ref 0–0.2)
BASOPHILS NFR BLD AUTO: 1 %
BUN SERPL-SCNC: 15 MG/DL (ref 9–20)
CALCIUM SPEC-MCNC: 9.3 MG/DL (ref 8.4–10.2)
CHLORIDE SERPL-SCNC: 108 MMOL/L (ref 98–107)
CO2 SERPL-SCNC: 24 MMOL/L (ref 22–30)
EOSINOPHIL # BLD AUTO: 0.1 K/UL (ref 0–0.7)
EOSINOPHIL NFR BLD AUTO: 1 %
ERYTHROCYTE [DISTWIDTH] IN BLOOD BY AUTOMATED COUNT: 4.55 M/UL (ref 4.3–5.9)
ERYTHROCYTE [DISTWIDTH] IN BLOOD: 13.4 % (ref 11.5–15.5)
GLUCOSE SERPL-MCNC: 85 MG/DL (ref 74–99)
HCT VFR BLD AUTO: 40.3 % (ref 39–53)
HGB BLD-MCNC: 13.1 GM/DL (ref 13–17.5)
INR PPP: 1 (ref ?–1.2)
LYMPHOCYTES # SPEC AUTO: 1.4 K/UL (ref 1–4.8)
LYMPHOCYTES NFR SPEC AUTO: 30 %
MCH RBC QN AUTO: 28.7 PG (ref 25–35)
MCHC RBC AUTO-ENTMCNC: 32.4 G/DL (ref 31–37)
MCV RBC AUTO: 88.6 FL (ref 80–100)
MONOCYTES # BLD AUTO: 0.3 K/UL (ref 0–1)
MONOCYTES NFR BLD AUTO: 6 %
NEUTROPHILS # BLD AUTO: 2.9 K/UL (ref 1.3–7.7)
NEUTROPHILS NFR BLD AUTO: 60 %
PLATELET # BLD AUTO: 232 K/UL (ref 150–450)
POTASSIUM SERPL-SCNC: 4.3 MMOL/L (ref 3.5–5.1)
PROT SERPL-MCNC: 7.7 G/DL (ref 6.3–8.2)
PT BLD: 10.6 SEC (ref 9–12)
SODIUM SERPL-SCNC: 141 MMOL/L (ref 137–145)
WBC # BLD AUTO: 4.8 K/UL (ref 3.8–10.6)

## 2023-03-20 PROCEDURE — 36415 COLL VENOUS BLD VENIPUNCTURE: CPT

## 2023-03-20 PROCEDURE — 96376 TX/PRO/DX INJ SAME DRUG ADON: CPT

## 2023-03-20 PROCEDURE — 85610 PROTHROMBIN TIME: CPT

## 2023-03-20 PROCEDURE — 85730 THROMBOPLASTIN TIME PARTIAL: CPT

## 2023-03-20 PROCEDURE — 80053 COMPREHEN METABOLIC PANEL: CPT

## 2023-03-20 PROCEDURE — 99285 EMERGENCY DEPT VISIT HI MDM: CPT

## 2023-03-20 PROCEDURE — 85025 COMPLETE CBC W/AUTO DIFF WBC: CPT

## 2023-03-20 PROCEDURE — 96375 TX/PRO/DX INJ NEW DRUG ADDON: CPT

## 2023-03-20 PROCEDURE — 71046 X-RAY EXAM CHEST 2 VIEWS: CPT

## 2023-03-20 PROCEDURE — 83735 ASSAY OF MAGNESIUM: CPT

## 2023-03-20 PROCEDURE — 83690 ASSAY OF LIPASE: CPT

## 2023-03-20 PROCEDURE — 96374 THER/PROPH/DIAG INJ IV PUSH: CPT

## 2023-03-20 PROCEDURE — 96361 HYDRATE IV INFUSION ADD-ON: CPT

## 2023-03-20 PROCEDURE — 99284 EMERGENCY DEPT VISIT MOD MDM: CPT

## 2023-03-20 PROCEDURE — 74177 CT ABD & PELVIS W/CONTRAST: CPT

## 2023-03-20 PROCEDURE — 84484 ASSAY OF TROPONIN QUANT: CPT

## 2023-03-20 NOTE — XR
EXAMINATION TYPE: XR chest 2V

 

DATE OF EXAM: 3/20/2023

 

COMPARISON: 11/20/2022, 10/17/2021, 3/5/2022

 

HISTORY: 42-year-old male left lower chest pain

 

TECHNIQUE:  PA and lateral views

 

FINDINGS:  

Partially visualized fixation at the right humeral head. Surgical clips project at the posterior left
 chest wall. Some air lucency extends along the superior aspect of the medial left hemidiaphragm. Not
 seen on 10/17/2021. Heart normal size. No consolidation or pleural effusion.

 

 

IMPRESSION:  

Postsurgical changes left hemithorax. There may be a small diaphragmatic hernia or eventration medial
 left base. This was present back to 3/5/2022 but not clearly seen on 10/17/2021. Otherwise, no acute
 process seen.

## 2023-03-20 NOTE — CT
EXAMINATION TYPE: CT abdomen pelvis w con

 

DATE OF EXAM: 3/20/2023

 

COMPARISON: Prior CT September 9, 2022

 

HISTORY: nausea, epigastric pain, h/o al-en-y bypass procedure.

 

CT DLP: 934.9 mGycm, Automated Exposure Control for Dose Reduction was Utilized.

 

CONTRAST: 

CT scan of the abdomen and pelvis is performed with oral and with IV Contrast, patient injected with 
100 mL of Isovue 370.

 

FINDINGS:

 

LUNG BASES: Focal mild posterior left basilar linear scarring is redemonstrated.

 

LIVER/GB: Gallbladder is surgically absent.

 

PANCREAS:  No significant abnormality is seen.

 

SPLEEN:  No significant abnormality is seen.

 

ADRENALS:  No significant abnormality is seen.

 

KIDNEYS:  No significant abnormality is seen.

 

BOWEL: Surgical changes from gastric bypass procedure are redemonstrated with herniated gastric remna
nt above the diaphragm containing air-fluid level redemonstrated. Small gastric remnant below diaphra
gm continues into anastomotic contrast filled small bowel loop. There are scattered small and large b
owel loops throughout the remainder of the abdomen and pelvis. Mild wall thickening in portions of co
xander could reflect product of uncomplicated colitis versus poor distention. No surrounding fat strandi
ng is present.

 

PROSTATE/SEMINAL VESICLES:  No gross abnormality seen.

 

LYMPH NODES:  No greater than 1cm abdominal or pelvic lymph nodes are appreciated.

 

OSSEOUS STRUCTURES: Postsurgical change lumbosacral junction is redemonstrated. Prominent Schmorl nod
es superior L4 endplate redemonstrated.

 

OTHER:  No significant additional abnormality is seen.

 

IMPRESSION: There is persistent hiatal hernia of the surgical gastric remnant. There may be focal tyrone
nosis or narrowing near site of surgery as contrast is barely passing into anastomotic small bowel lo
op. There is overall nonobstructive bowel gas pattern redemonstrated. Areas of mild uncomplicated col
itis cannot be excluded. No significant change from most recent CT.

## 2023-03-20 NOTE — ED
General Adult HPI





- General


Chief complaint: Abdominal Pain


Stated complaint: ABD Pain


Time Seen by Provider: 23 14:06


Source: patient, RN notes reviewed


Mode of arrival: ambulatory


Limitations: no limitations





- History of Present Illness


Initial comments: 





Patient is a pleasant 42-year-old male presenting to the emergency department 

with concerns for abdominal discomfort.  Onset of symptoms was a couple of days 

ago.  Patient has decreased appetite.  Discomfort is epigastric however does 

start to radiate a little towards the chest.  Patient does have history of 

similar symptoms previously associated with his post bariatric surgical state.  

Patient had stomach stapling done several years ago.  This procedure was done at

[facility].





- Related Data


                                Home Medications











 Medication  Instructions  Recorded  Confirmed


 


Metoprolol Tartrate [Lopressor] 12.5 mg PO BID 08/04/22 12/15/22


 


traZODone  mg PO HS 08/04/22 12/15/22


 


Ondansetron Odt [Zofran Odt] 8 mg PO Q8H PRN 09/09/22 12/15/22


 


Cyclobenzaprine [Flexeril] 10 mg PO HS PRN 09/11/22 12/15/22


 


Dicyclomine [Bentyl] 10 mg PO QID PRN 09/11/22 12/15/22


 


Eszopiclone [Lunesta] 2 mg PO HS 11/10/22 12/15/22


 


Omeprazole 20 mg PO DAILY 11/10/22 12/15/22











                                    Allergies











Allergy/AdvReac Type Severity Reaction Status Date / Time


 


latex Allergy  Rash/Hives Verified 23 13:03


 


meperidine [From Demerol] Allergy  Rash/Hives Verified 23 13:03














Review of Systems


ROS Statement: 


Those systems with pertinent positive or pertinent negative responses have been 

documented in the HPI.





ROS Other: All systems not noted in ROS Statement are negative.


Constitutional: Denies: fever


Eyes: Denies: eye pain


ENT: Denies: ear pain


Respiratory: Denies: dyspnea


Cardiovascular: Denies: palpitations


Endocrine: Denies: fatigue


Gastrointestinal: Reports: as per HPI, abdominal pain, nausea


Genitourinary: Denies: dysuria


Musculoskeletal: Denies: back pain


Skin: Denies: rash


Neurological: Denies: weakness





Past Medical History


Past Medical History: Atrial Fibrillation, GERD/Reflux, Hypertension


Additional Past Medical History / Comment(s): Pt states difficulty with 

abdominal pain/nausea and vomiting when eating since surgery for hiatal 

hernia/peg tube insertion.States afib during his  hospitalization for carola en Y/

peg tube surgery, R shoulder dislocations, pt states recent EKG at Dr. Marvin's 

office showed MI at some time, chronic low back pain. Removal of Peg Tube. 

States having dizziness. See Dr. Raygoza's H&P.


History of Any Multi-Drug Resistant Organisms: None Reported


Past Surgical History: Appendectomy, Back Surgery, Cholecystectomy, Hernia 

Repair, Orthopedic Surgery, Tonsillectomy


Additional Past Surgical History / Comment(s): Lysis of abdominal adhesions then

Carola en Y, peg tube insertion, EGD, diaphragmatic hernia repair, lumbar fusion, 

R shoulder arthroscopy, L shoulder fusion. States his hernia was attached to his

stomach wall and this is  why all the surgery. See Dr. Raygoza's H&P.


Past Anesthesia/Blood Transfusion Reactions: No Reported Reaction


Past Psychological History: No Psychological Hx Reported


Smoking Status: Never smoker


Past Alcohol Use History: None Reported


Past Drug Use History: None Reported





- Past Family History


  ** Mother


Additional Family Medical History / Comment(s): Mother is , she had 

lupus.





  ** Father


History Unknown: Yes





  ** Brother(s)


Additional Family Medical History / Comment(s): autoimmune disorder





General Exam


Limitations: no limitations


General appearance: alert, in no apparent distress


Head exam: Present: normocephalic


Eye exam: Present: normal appearance


Neck exam: Present: normal inspection


Respiratory exam: Present: normal lung sounds bilaterally


Cardiovascular Exam: Present: regular rate, normal rhythm


GI/Abdominal exam: Present: soft, tenderness (Mild to moderate epigastric 

tenderness to palpation), normal bowel sounds.  Absent: guarding, rebound, 

rigid, pulsatile mass


Extremities exam: Present: normal inspection.  Absent: pedal edema, calf 

tenderness


Neurological exam: Present: alert


Psychiatric exam: Present: normal affect, normal mood


Skin exam: Present: normal color





Course


                                   Vital Signs











  23





  13:01 15:19


 


Temperature 98.4 F 


 


Pulse Rate 63 56 L


 


Respiratory 20 18





Rate  


 


Blood Pressure 145/78 146/69


 


O2 Sat by Pulse 99 100





Oximetry  














EKG Findings





- EKG Results:


EKG: interpreted by ERMD, sinus rhythm, normal axis, normal QRS, normal ST/T





Medical Decision Making





- Medical Decision Making





Was pt. sent in by a medical professional or institution (, PA, NP, urgent 

care, hospital, or nursing home...) When possible be specific


@  -No


Did you speak to anyone other than the patient for history (EMS, parent, family,

police, friend...)? What history was obtained from this source 


@  -No


Did you review nursing and triage notes (agree or disagree)?  Why? 


@  -I reviewed and agree with nursing and triage notes


Were old charts reviewed (outside hosp., previous admission, EMS record, old 

EKG, old radiological studies, urgent care reports/EKG's, nursing home records)?

Report findings 


@  -No old charts were reviewed


Differential Diagnosis (chest pain, altered mental status, abdominal pain women,

abdominal pain men, vaginal bleeding, weakness, fever, dyspnea, syncope, 

headache, dizziness, GI bleed, back pain, seizure, CVA, palpatations, mental 

health)? 


@  -Differential Abdominal Pain Men:


Appendicitis, cholecystitis, diverticulosis, ischemic bowel, pancreatitis, 

hepatitis, UTI, gastroenteritis, AAA, incarcerated hernia, bowel obstruction, 

constipation, inflammatory bowel, hepatitis, peptic ulcer disease, splenic 

infarction, perforated viscus, testicular torsion, this is not meant to be an 

all-inclusive list


EKG interpreted by me (3pts min.).


@  -As above


X-rays interpreted by me (1pt min.).


@  -None done


CT interpreted by me (1pt min.).


@  -Report reviewed


U/S interpreted by me (1pt. min.).


@  -None done


What testing was considered but not performed or refused? (CT, X-rays, U/S, 

labs)? Why?


@  -None


What meds were considered but not given or refused? Why?


@  -None


Did you discuss the management of the patient with other professionals 

(professionals i.e. , PA, NP, lab, RT, psych nurse, , , 

teacher, , )? Give summary


@  -Case was discussed with Dr. Cruz who states patient does need to follow-

up with his surgeon for probable procedure or stretching.


Was smoking cessation discussed for >3mins.?


@  -No


Was critical care preformed (if so, how long)?


@  -No


Were there social determinants of health that impacted care today? How? 

(Homelessness, low income, unemployed, alcoholism, drug addiction, tr

ansportation, low edu. Level, literacy, decrease access to med. care, long-term, 

rehab)?


@  -No


Was there de-escalation of care discussed even if they declined (Discuss DNR or 

withdrawal of care, Hospice)? DNR status


@  -No


What co-morbidities impacted this encounter? (DM, HTN, Smoking, COPD, CAD, 

Cancer, CVA, ARF, Chemo, Hep., AIDS, mental health diagnosis, sleep apnea, 

morbid obesity)?


@  -None


Was patient admitted / discharged? Hospital course, mention meds given and 

route, prescriptions, significant lab abnormalities, going to OR and other 

pertinent info.


@  -Patient reevaluated and updated.  Patient offered transfer however he 

refuses stating he will follow-up on his own. 


Undiagnosed new problem with uncertain prognosis?


@  -No


Drug Therapy requiring intensive monitoring for toxicity (Heparin, Nitro, In

sulin, Cardizem)?


@  -No


Were any procedures done?


@  -No


Diagnosis/symptom?


@  -[Esophageal stenosis


Acute, or Chronic, or Acute on Chronic?


@  -Acute


Uncomplicated (without systemic symptoms) or Complicated (systemic symptoms)?


@  -default


Side effects of treatment?


@  -No


Exacerbation, Progression, or Severe Exacerbation?


@  -No


Poses a threat to life or bodily function? How? (Chest pain, USA, MI, pneumonia,

 PE, COPD, DKA, ARF, appy, cholecystitis, CVA, Diverticulitis, Homicidal, 

Suicidal, threat to staff... and all critical care pts)


@  -No





- Lab Data


Result diagrams: 


                                 23 13:20





                                 23 13:20


                                   Lab Results











  23 Range/Units





  13:20 13:20 13:20 


 


WBC  4.8    (3.8-10.6)  k/uL


 


RBC  4.55    (4.30-5.90)  m/uL


 


Hgb  13.1    (13.0-17.5)  gm/dL


 


Hct  40.3    (39.0-53.0)  %


 


MCV  88.6    (80.0-100.0)  fL


 


MCH  28.7    (25.0-35.0)  pg


 


MCHC  32.4    (31.0-37.0)  g/dL


 


RDW  13.4    (11.5-15.5)  %


 


Plt Count  232    (150-450)  k/uL


 


MPV  7.8    


 


Neutrophils %  60    %


 


Lymphocytes %  30    %


 


Monocytes %  6    %


 


Eosinophils %  1    %


 


Basophils %  1    %


 


Neutrophils #  2.9    (1.3-7.7)  k/uL


 


Lymphocytes #  1.4    (1.0-4.8)  k/uL


 


Monocytes #  0.3    (0-1.0)  k/uL


 


Eosinophils #  0.1    (0-0.7)  k/uL


 


Basophils #  0.0    (0-0.2)  k/uL


 


Sodium   141   (137-145)  mmol/L


 


Potassium   4.3   (3.5-5.1)  mmol/L


 


Chloride   108 H   ()  mmol/L


 


Carbon Dioxide   24   (22-30)  mmol/L


 


Anion Gap   9   mmol/L


 


BUN   15   (9-20)  mg/dL


 


Creatinine   0.79   (0.66-1.25)  mg/dL


 


Est GFR (CKD-EPI)AfAm   >90   (>60 ml/min/1.73 sqM)  


 


Est GFR (CKD-EPI)NonAf   >90   (>60 ml/min/1.73 sqM)  


 


Glucose   85   (74-99)  mg/dL


 


Calcium   9.3   (8.4-10.2)  mg/dL


 


Total Bilirubin   0.6   (0.2-1.3)  mg/dL


 


AST   22   (17-59)  U/L


 


ALT   19   (4-49)  U/L


 


Alkaline Phosphatase   95   ()  U/L


 


Troponin I    <0.012  (0.000-0.034)  ng/mL


 


Total Protein   7.7   (6.3-8.2)  g/dL


 


Albumin   4.7   (3.5-5.0)  g/dL














Disposition


Clinical Impression: 


 Esophageal stenosis





Disposition: HOME SELF-CARE


Condition: Stable


Instructions (If sedation given, give patient instructions):  Hiatal Hernia 

(ED), Esophageal Stricture (ED)


Additional Instructions: 


Please do follow-up with your surgeon, as soon as tomorrow.  If your having dif

ficulty tolerating oral intake head directly to your surgeon.  Computed 

tomography scan provided.  Return for increased pain, vomiting, not tolerating 

fluids, worsening symptoms or other concerns.


Is patient prescribed a controlled substance at d/c from ED?: No


Referrals: 


Tyra Marvin MD [Primary Care Provider] - 1-2 days


Time of Disposition: 15:48

## 2023-03-20 NOTE — ED
General Adult HPI





- General


Chief complaint: Abdominal Pain


Stated complaint: abd pain,vomiting


Time Seen by Provider: 23 22:39


Source: patient


Mode of arrival: wheelchair





- History of Present Illness


Initial comments: 


This is a 42-year-old male that is well-known to the emergency department 

presents emergency department once again for abdominal pain and intractable 

nausea and vomiting.  The patient had been seen earlier in the day in the 

emergency department for abdominal pain with computed tomography scan was 

obtained and did show stenosis of his surgical site and it was recommended to 

transfer the patient to [facility] where he had his original procedure 

done however the patient was concerned about his job and would follow-up they're

himself.  The patient stated that when he arrived home he had continued 

intractable nausea and vomiting that began as well as continued abdominal pain. 

The patient stated that because of this EKG back to the emergency department and

is now okay with being transferred to [facility].  The patient did 

request medications for his nausea and vomiting including asking for Dilaudid 

initially on arrival.  The patient was vomiting on evaluation but his abdominal 

exam did not show any acute signs of peritonitis or surgical abdomen.  The 

patient denied any fevers or chills as well as any other sick contacts.








- Related Data


                                Home Medications











 Medication  Instructions  Recorded  Confirmed


 


Metoprolol Tartrate [Lopressor] 12.5 mg PO BID 08/04/22 12/15/22


 


traZODone  mg PO HS 08/04/22 12/15/22


 


Ondansetron Odt [Zofran Odt] 8 mg PO Q8H PRN 09/09/22 12/15/22


 


Cyclobenzaprine [Flexeril] 10 mg PO HS PRN 09/11/22 12/15/22


 


Dicyclomine [Bentyl] 10 mg PO QID PRN 09/11/22 12/15/22


 


Eszopiclone [Lunesta] 2 mg PO HS 11/10/22 12/15/22


 


Omeprazole 20 mg PO DAILY 11/10/22 12/15/22











                                    Allergies











Allergy/AdvReac Type Severity Reaction Status Date / Time


 


latex Allergy  Rash/Hives Verified 23 13:03


 


meperidine [From Demerol] Allergy  Rash/Hives Verified 23 13:03














Review of Systems


ROS Statement: 


Those systems with pertinent positive or pertinent negative responses have been 

documented in the HPI.





ROS Other: All systems not noted in ROS Statement are negative.





Past Medical History


Past Medical History: Atrial Fibrillation, GERD/Reflux, Hypertension


Additional Past Medical History / Comment(s): Pt states difficulty with 

abdominal pain/nausea and vomiting when eating since surgery for hiatal 

hernia/peg tube insertion.States afib during his  hospitalization for carola en 

Y/peg tube surgery, R shoulder dislocations, pt states recent EKG at Dr. Marvin's

office showed MI at some time, chronic low back pain. Removal of Peg Tube. 

States having dizziness. See Dr. Raygoza's H&P.


History of Any Multi-Drug Resistant Organisms: None Reported


Past Surgical History: Appendectomy, Back Surgery, Cholecystectomy, Hernia 

Repair, Orthopedic Surgery, Tonsillectomy


Additional Past Surgical History / Comment(s): Lysis of abdominal adhesions then

Carola en Y, peg tube insertion, EGD, diaphragmatic hernia repair, lumbar fusion, 

R shoulder arthroscopy, L shoulder fusion. States his hernia was attached to his

stomach wall and this is  why all the surgery. See Dr. Raygoza's H&P.


Past Anesthesia/Blood Transfusion Reactions: No Reported Reaction


Past Psychological History: No Psychological Hx Reported


Smoking Status: Never smoker


Past Alcohol Use History: None Reported


Past Drug Use History: None Reported





- Past Family History


  ** Mother


Additional Family Medical History / Comment(s): Mother is , she had 

lupus.





  ** Father


History Unknown: Yes





  ** Brother(s)


Additional Family Medical History / Comment(s): autoimmune disorder





General Exam


Limitations: no limitations


General appearance: alert, in distress (Actively vomiting, complaining of 

abdominal pain)


Head exam: Present: atraumatic, normocephalic, normal inspection


Eye exam: Present: normal appearance, PERRL


Pupils: Present: normal accommodation


ENT exam: Present: normal exam, normal oropharynx, mucous membranes moist


Neck exam: Present: normal inspection, full ROM


Respiratory exam: Present: normal lung sounds bilaterally


Cardiovascular Exam: Present: regular rate, normal rhythm, normal heart sounds


GI/Abdominal exam: Present: soft, tenderness (Tenderness noted in the epigastric

and left upper quadrant)


Extremities exam: Present: normal inspection, full ROM


Back exam: Present: normal inspection, full ROM


Neurological exam: Present: alert, oriented X3, CN II-XII intact


Psychiatric exam: Present: normal affect, normal mood


Skin exam: Present: warm, dry





Course


                                   Vital Signs











  23





  22:34


 


Temperature 98.9 F


 


Pulse Rate 67


 


Respiratory 18





Rate 


 


Blood Pressure 133/68


 


O2 Sat by Pulse 100





Oximetry 














Medical Decision Making





- Medical Decision Making


Was pt. sent in by a medical professional or institution (AVEL Gallegos, NP, urgent 

care, hospital, or nursing home...) When possible be specific


@  -No


Did you speak to anyone other than the patient for history (EMS, parent, family,

police, friend...)? What history was obtained from this source 


@  -No


Did you review nursing and triage notes (agree or disagree)?  Why? 


@  -I reviewed and agree with nursing and triage notes


Were old charts reviewed (outside hosp., previous admission, EMS record, old 

EKG, old radiological studies, urgent care reports/EKG's, nursing home records)?

Report findings 


@  -Yes, previous note from the ER this morning was reviewed


Differential Diagnosis (chest pain, altered mental status, abdominal pain women,

abdominal pain men, vaginal bleeding, weakness, fever, dyspnea, syncope, 

headache, dizziness, GI bleed, back pain, seizure, CVA, palpatations, mental 

health)? 


@  -Postop complication, small bowel obstruction, intractable nausea and 

vomiting


EKG interpreted by me (3pts min.).


@  -None


X-rays interpreted by me (1pt min.).


@  -None done


CT interpreted by me (1pt min.).


@  -None done


U/S interpreted by me (1pt. min.).


@  -None done


What testing was considered but not performed or refused? (CT, X-rays, U/S, 

labs)? Why?


@  -Computed tomography scan was not obtained as he had a computed tomography 

scan less than 12 hours ago in emergency department this morning.


What meds were considered but not given or refused? Why?


@  -None


Did you discuss the management of the patient with other professionals 

(professionals i.e. AVEL Gallegos, NP, lab, RT, psych nurse, , , 

teacher, , )? Give summary


@  -Yes, transfer team at [place] was contacted and the emergency department 

physician, Dr. Vargas was contacted and accepted the patient for transfer at 

[id number]


Was smoking cessation discussed for >3mins.?


@  -No


Was critical care preformed (if so, how long)?


@  -No


Were there social determinants of health that impacted care today? How? 

(Homelessness, low income, unemployed, alcoholism, drug addiction, 

transportation, low edu. Level, literacy, decrease access to med. care, snf, 

rehab)?


@  -No


Was there de-escalation of care discussed even if they declined (Discuss DNR or 

withdrawal of care, Hospice)? DNR status


@  -No


What co-morbidities impacted this encounter? (DM, HTN, Smoking, COPD, CAD, Can

cer, CVA, ARF, Chemo, Hep., AIDS, mental health diagnosis, sleep apnea, morbid 

obesity)?


@  -Multiple previous admissions for abdominal pain, extensive abdominal surgery

including Carola-en-Y, previous thoracotomy and hiatal hernia surgery with mesh


Was patient admitted / discharged? Hospital course, mention meds given and 

route, prescriptions, significant lab abnormalities, going to OR and other 

pertinent info.


@  -The patient was seen and evaluated emergency department.  Physical exam, the

patient was actively vomiting and acute distress secondary to abdominal pain.  

Vital signs were however within normal limits.  The patient had a full workup 

obtained earlier today including a computed tomography scan.  It was recommended

to transfer to [facility] at that time however the patient refused.  The

patient did present back and was agreeable to transfer and did state that his 

pain was increasing as well as now having intractable nausea and vomiting.  

[facility] was also contacted for transfer and the emergency physician 

did accept the patient for transfer.  The patient was agreeable to this plan and

was transferred in stable condition.  The patient did receive 0.5 mg of Dilaudid

as well as Compazine and Benadryl for his intractable nausea and vomiting 

abdominal pain.


Undiagnosed new problem with uncertain prognosis?


@  -No


Drug Therapy requiring intensive monitoring for toxicity (Heparin, Nitro, 

Insulin, Cardizem)?


@  -No


Were any procedures done?


@  -No


Diagnosis/symptom?


@  -Intractable nausea and vomiting likely secondary to stenosis of the 

anastomosis surgical site.


Acute, or Chronic, or Acute on Chronic?


@  -Acute on chronic


Uncomplicated (without systemic symptoms) or Complicated (systemic symptoms)?


@  -Complicated


Side effects of treatment?


@  -No


Exacerbation, Progression, or Severe Exacerbation?


@  -No


Poses a threat to life or bodily function? How? (Chest pain, USA, MI, pneumonia,

PE, COPD, DKA, ARF, appy, cholecystitis, CVA, Diverticulitis, Homicidal, 

Suicidal, threat to staff... and all critical care pts)


@  -No








Disposition


Clinical Impression: 


 Intractable abdominal pain, Intractable nausea and vomiting, Post-operative 

complication





Disposition: OTHER INSTITUTION NOT DEFINED


Condition: Stable


Is patient prescribed a controlled substance at d/c from ED?: No


Referrals: 


Tyra Marvin MD [Primary Care Provider] - 1-2 days


Time of Disposition: 22:45





- Out of Hospital Transfer - Req. Specs


Out of Hospital Transfer - Requested Specifics: Other Emergency Center ([facility])

## 2023-03-21 LAB
ALBUMIN SERPL-MCNC: 4.7 G/DL (ref 3.5–5)
ALP SERPL-CCNC: 280 U/L (ref 38–126)
ALT SERPL-CCNC: 333 U/L (ref 4–49)
ANION GAP SERPL CALC-SCNC: 13 MMOL/L
AST SERPL-CCNC: 707 U/L (ref 17–59)
BASOPHILS # BLD AUTO: 0.1 K/UL (ref 0–0.2)
BASOPHILS NFR BLD AUTO: 1 %
BUN SERPL-SCNC: 14 MG/DL (ref 9–20)
CALCIUM SPEC-MCNC: 9.6 MG/DL (ref 8.4–10.2)
CHLORIDE SERPL-SCNC: 105 MMOL/L (ref 98–107)
CO2 SERPL-SCNC: 20 MMOL/L (ref 22–30)
EOSINOPHIL # BLD AUTO: 0 K/UL (ref 0–0.7)
EOSINOPHIL NFR BLD AUTO: 0 %
ERYTHROCYTE [DISTWIDTH] IN BLOOD BY AUTOMATED COUNT: 4.5 M/UL (ref 4.3–5.9)
ERYTHROCYTE [DISTWIDTH] IN BLOOD: 13.5 % (ref 11.5–15.5)
GLUCOSE SERPL-MCNC: 129 MG/DL (ref 74–99)
HCT VFR BLD AUTO: 39.2 % (ref 39–53)
HGB BLD-MCNC: 12.8 GM/DL (ref 13–17.5)
LIPASE SERPL-CCNC: 3590 U/L (ref 23–300)
LYMPHOCYTES # SPEC AUTO: 0.7 K/UL (ref 1–4.8)
LYMPHOCYTES NFR SPEC AUTO: 11 %
MAGNESIUM SPEC-SCNC: 1.8 MG/DL (ref 1.6–2.3)
MCH RBC QN AUTO: 28.4 PG (ref 25–35)
MCHC RBC AUTO-ENTMCNC: 32.6 G/DL (ref 31–37)
MCV RBC AUTO: 87.1 FL (ref 80–100)
MONOCYTES # BLD AUTO: 0.4 K/UL (ref 0–1)
MONOCYTES NFR BLD AUTO: 6 %
NEUTROPHILS # BLD AUTO: 5.4 K/UL (ref 1.3–7.7)
NEUTROPHILS NFR BLD AUTO: 81 %
PLATELET # BLD AUTO: 228 K/UL (ref 150–450)
POTASSIUM SERPL-SCNC: 4.1 MMOL/L (ref 3.5–5.1)
PROT SERPL-MCNC: 7.6 G/DL (ref 6.3–8.2)
SODIUM SERPL-SCNC: 138 MMOL/L (ref 137–145)
WBC # BLD AUTO: 6.6 K/UL (ref 3.8–10.6)

## 2023-04-13 ENCOUNTER — HOSPITAL ENCOUNTER (OUTPATIENT)
Dept: HOSPITAL 47 - LABWHC1 | Age: 43
Discharge: HOME | End: 2023-04-13
Attending: NURSE PRACTITIONER
Payer: COMMERCIAL

## 2023-04-13 DIAGNOSIS — R74.8: Primary | ICD-10-CM

## 2023-04-13 PROCEDURE — 83690 ASSAY OF LIPASE: CPT

## 2023-04-13 PROCEDURE — 80053 COMPREHEN METABOLIC PANEL: CPT

## 2023-04-13 PROCEDURE — 82150 ASSAY OF AMYLASE: CPT

## 2023-04-13 PROCEDURE — 85025 COMPLETE CBC W/AUTO DIFF WBC: CPT

## 2023-04-13 PROCEDURE — 36415 COLL VENOUS BLD VENIPUNCTURE: CPT

## 2023-04-14 LAB
ALBUMIN SERPL-MCNC: 4.5 G/DL (ref 3.8–4.9)
ALBUMIN/GLOB SERPL: 1.64 G/DL (ref 1.6–3.17)
ALP SERPL-CCNC: 109 U/L (ref 41–126)
ALT SERPL-CCNC: 22 U/L (ref 10–49)
AMYLASE SERPL-CCNC: 74 U/L (ref 23–121)
ANION GAP SERPL CALC-SCNC: 11.3 MMOL/L (ref 10–18)
AST SERPL-CCNC: 18 U/L (ref 14–35)
BASOPHILS # BLD AUTO: 0.03 X 10*3/UL (ref 0–0.1)
BASOPHILS NFR BLD AUTO: 0.9 %
BUN SERPL-SCNC: 13.3 MG/DL (ref 9–27)
BUN/CREAT SERPL: 14.7 RATIO (ref 12–20)
CALCIUM SPEC-MCNC: 9.7 MG/DL (ref 8.7–10.3)
CHLORIDE SERPL-SCNC: 105 MMOL/L (ref 96–109)
CO2 SERPL-SCNC: 23.7 MMOL/L (ref 20–27.5)
EOSINOPHIL # BLD AUTO: 0.02 X 10*3/UL (ref 0.04–0.35)
EOSINOPHIL NFR BLD AUTO: 0.6 %
ERYTHROCYTE [DISTWIDTH] IN BLOOD BY AUTOMATED COUNT: 4.47 X 10*6/UL (ref 4.4–5.6)
ERYTHROCYTE [DISTWIDTH] IN BLOOD: 14.2 % (ref 11.5–14.5)
GLOBULIN SER CALC-MCNC: 2.8 G/DL (ref 1.6–3.3)
GLUCOSE SERPL-MCNC: 81 MG/DL (ref 70–110)
HCT VFR BLD AUTO: 39.6 % (ref 39.6–50)
HGB BLD-MCNC: 12.2 G/DL (ref 13–17)
IMM GRANULOCYTES BLD QL AUTO: 0.3 %
LIPASE SERPL-CCNC: 34 U/L (ref 14–60)
LYMPHOCYTES # SPEC AUTO: 1.68 X 10*3/UL (ref 0.9–5)
LYMPHOCYTES NFR SPEC AUTO: 48.4 %
MCH RBC QN AUTO: 27.3 PG (ref 27–32)
MCHC RBC AUTO-ENTMCNC: 30.8 G/DL (ref 32–37)
MCV RBC AUTO: 88.6 FL (ref 80–97)
MONOCYTES # BLD AUTO: 0.23 X 10*3/UL (ref 0.2–1)
MONOCYTES NFR BLD AUTO: 6.6 %
NEUTROPHILS # BLD AUTO: 1.5 X 10*3/UL (ref 1.8–7.7)
NEUTROPHILS NFR BLD AUTO: 43.2 %
NRBC BLD AUTO-RTO: 0 /100 WBCS (ref 0–0)
PLATELET # BLD AUTO: 272 X 10*3/UL (ref 140–440)
POTASSIUM SERPL-SCNC: 4 MMOL/L (ref 3.5–5.5)
PROT SERPL-MCNC: 7.3 G/DL (ref 6.2–8.2)
SODIUM SERPL-SCNC: 140 MMOL/L (ref 135–145)
WBC # BLD AUTO: 3.47 X 10*3/UL (ref 4.5–10)

## 2023-06-11 ENCOUNTER — HOSPITAL ENCOUNTER (OUTPATIENT)
Dept: HOSPITAL 47 - EC | Age: 43
Setting detail: OBSERVATION
Discharge: LEFT BEFORE DISCHARGE | End: 2023-06-11
Payer: MEDICAID

## 2023-06-11 VITALS — SYSTOLIC BLOOD PRESSURE: 106 MMHG | DIASTOLIC BLOOD PRESSURE: 75 MMHG | HEART RATE: 49 BPM | RESPIRATION RATE: 22 BRPM

## 2023-06-11 VITALS — TEMPERATURE: 98 F

## 2023-06-11 DIAGNOSIS — R07.89: Primary | ICD-10-CM

## 2023-06-11 DIAGNOSIS — Z98.1: ICD-10-CM

## 2023-06-11 DIAGNOSIS — Z53.29: ICD-10-CM

## 2023-06-11 DIAGNOSIS — Z91.040: ICD-10-CM

## 2023-06-11 DIAGNOSIS — Z88.5: ICD-10-CM

## 2023-06-11 DIAGNOSIS — Z98.84: ICD-10-CM

## 2023-06-11 DIAGNOSIS — Z90.49: ICD-10-CM

## 2023-06-11 DIAGNOSIS — R11.2: ICD-10-CM

## 2023-06-11 DIAGNOSIS — M54.50: ICD-10-CM

## 2023-06-11 DIAGNOSIS — K44.9: ICD-10-CM

## 2023-06-11 DIAGNOSIS — K21.9: ICD-10-CM

## 2023-06-11 DIAGNOSIS — G89.29: ICD-10-CM

## 2023-06-11 DIAGNOSIS — I10: ICD-10-CM

## 2023-06-11 DIAGNOSIS — I48.0: ICD-10-CM

## 2023-06-11 LAB
ALBUMIN SERPL-MCNC: 4.6 G/DL (ref 3.5–5)
ALP SERPL-CCNC: 146 U/L (ref 38–126)
ALT SERPL-CCNC: 244 U/L (ref 4–49)
AMYLASE SERPL-CCNC: 107 U/L (ref 30–110)
ANION GAP SERPL CALC-SCNC: 11 MMOL/L
APTT BLD: 20.8 SEC (ref 22–30)
AST SERPL-CCNC: 566 U/L (ref 17–59)
BASOPHILS # BLD AUTO: 0 K/UL (ref 0–0.2)
BASOPHILS NFR BLD AUTO: 0 %
BUN SERPL-SCNC: 19 MG/DL (ref 9–20)
CALCIUM SPEC-MCNC: 9 MG/DL (ref 8.4–10.2)
CHLORIDE SERPL-SCNC: 105 MMOL/L (ref 98–107)
CO2 SERPL-SCNC: 26 MMOL/L (ref 22–30)
EOSINOPHIL # BLD AUTO: 0.1 K/UL (ref 0–0.7)
EOSINOPHIL NFR BLD AUTO: 1 %
ERYTHROCYTE [DISTWIDTH] IN BLOOD BY AUTOMATED COUNT: 4.62 M/UL (ref 4.3–5.9)
ERYTHROCYTE [DISTWIDTH] IN BLOOD: 15.4 % (ref 11.5–15.5)
GLUCOSE SERPL-MCNC: 88 MG/DL (ref 74–99)
HCT VFR BLD AUTO: 40.2 % (ref 39–53)
HGB BLD-MCNC: 12.8 GM/DL (ref 13–17.5)
INR PPP: 0.9 (ref ?–1.2)
LIPASE SERPL-CCNC: 235 U/L (ref 23–300)
LYMPHOCYTES # SPEC AUTO: 1.4 K/UL (ref 1–4.8)
LYMPHOCYTES NFR SPEC AUTO: 14 %
MAGNESIUM SPEC-SCNC: 2.1 MG/DL (ref 1.6–2.3)
MCH RBC QN AUTO: 27.6 PG (ref 25–35)
MCHC RBC AUTO-ENTMCNC: 31.8 G/DL (ref 31–37)
MCV RBC AUTO: 87 FL (ref 80–100)
MONOCYTES # BLD AUTO: 0.7 K/UL (ref 0–1)
MONOCYTES NFR BLD AUTO: 6 %
NEUTROPHILS # BLD AUTO: 8.1 K/UL (ref 1.3–7.7)
NEUTROPHILS NFR BLD AUTO: 78 %
PLATELET # BLD AUTO: 224 K/UL (ref 150–450)
POTASSIUM SERPL-SCNC: 4.1 MMOL/L (ref 3.5–5.1)
PROT SERPL-MCNC: 7.8 G/DL (ref 6.3–8.2)
PT BLD: 9.7 SEC (ref 9–12)
SODIUM SERPL-SCNC: 142 MMOL/L (ref 137–145)
WBC # BLD AUTO: 10.4 K/UL (ref 3.8–10.6)

## 2023-06-11 PROCEDURE — 71046 X-RAY EXAM CHEST 2 VIEWS: CPT

## 2023-06-11 PROCEDURE — 85730 THROMBOPLASTIN TIME PARTIAL: CPT

## 2023-06-11 PROCEDURE — 85025 COMPLETE CBC W/AUTO DIFF WBC: CPT

## 2023-06-11 PROCEDURE — 96374 THER/PROPH/DIAG INJ IV PUSH: CPT

## 2023-06-11 PROCEDURE — 80053 COMPREHEN METABOLIC PANEL: CPT

## 2023-06-11 PROCEDURE — 93005 ELECTROCARDIOGRAM TRACING: CPT

## 2023-06-11 PROCEDURE — 82150 ASSAY OF AMYLASE: CPT

## 2023-06-11 PROCEDURE — 83690 ASSAY OF LIPASE: CPT

## 2023-06-11 PROCEDURE — 99285 EMERGENCY DEPT VISIT HI MDM: CPT

## 2023-06-11 PROCEDURE — 85610 PROTHROMBIN TIME: CPT

## 2023-06-11 PROCEDURE — 36415 COLL VENOUS BLD VENIPUNCTURE: CPT

## 2023-06-11 PROCEDURE — 85379 FIBRIN DEGRADATION QUANT: CPT

## 2023-06-11 PROCEDURE — 83735 ASSAY OF MAGNESIUM: CPT

## 2023-06-11 PROCEDURE — 84484 ASSAY OF TROPONIN QUANT: CPT

## 2023-06-11 NOTE — XR
EXAMINATION TYPE: XR chest 2V

 

DATE OF EXAM: 6/11/2023

 

COMPARISON: 3/20/2023

 

HISTORY: 43-year-old male with chest pain

 

TECHNIQUE:  PA and lateral views

 

FINDINGS:  

Heart normal size. Aorta and pulmonary vasculature within normal limits. Redemonstrated moderate-size
d hiatal hernia status post Carola-en-Y gastric bypass. Surgical clips related to prior left posterior 
eighth rib resection. Resurfacing right shoulder arthroplasty.  No consolidation or pleural effusion 
seen. 

 

 

IMPRESSION:  

No acute cardiopulmonary process. Redemonstrated small to moderate sized hiatal hernia status post Ro
ux-en-Y gastric bypass.

## 2023-06-11 NOTE — ED
Chest Pain HPI





- General


Source: patient


Mode of arrival: ambulatory


Limitations: no limitations





<Lily Hodges - Last Filed: 23 14:11>





- General


Source: patient, RN notes reviewed


Mode of arrival: ambulatory


Limitations: no limitations





<Kang Kessler - Last Filed: 23 18:25>





- General


Chief Complaint: Chest Pain


Stated Complaint: Chest pain


Time Seen by Provider: 23 14:11





- History of Present Illness


Initial Comments: 


Patient is a 43-year-old  male presented to the emergency room with 

complaints of chest pain which is epigastric in nature and severe which began 

while he was eating salsa and chips approximately 20 minutes prior to his 

arrival to the emergency room. He reports a history of significant GERD and 

follows with gastroenterology out of [place]. He had a Al-en-Y procedure 

completed at [place] by his gastroenterology team approximately 1 year ago. He 

reports that the chest pain is causing him to not be able to take in a deep 

breath consequently he is slightly short of breath. He reports nausea with 

vomiting. He denies any blood or coffee-ground emesis. He denies any diarrhea, 

headache, dizziness, diaphoresis, orthopnea, fevers or chills. He states that he

was diagnosed with atrial fibrillation which was paroxysmal in the past at 

[place] but is not currently on any medication for atrial fibrillation 

including anticoagulants. He has a past medical history significant for 

hypertension. (Lily Hodges)


Patient is a pleasant 43-year-old male presenting to the emergency department 

with concerns with chest pain.  Onset of symptoms was a couple hours ago eating.

 Discomfort feels like heaviness and remains persistent.  Mild associated 

dyspnea.  Patient does have nausea and did vomit once.  No diaphoresis.  Patient

unclear if he has history of similar symptoms previous.  Patient does have 

history of hiatal hernia however is not clear if this is similar. (Kang Kessler)





- Related Data


                                Home Medications











 Medication  Instructions  Recorded  Confirmed


 


traZODone  mg PO HS 22


 


Eszopiclone [Lunesta] 3 mg PO HS 23











                                    Allergies











Allergy/AdvReac Type Severity Reaction Status Date / Time


 


latex Allergy  Rash/Hives Verified 23 17:47


 


meperidine [From Demerol] Allergy  Rash/Hives Verified 23 17:47














Review of Systems


ROS Other: All systems not noted in ROS Statement are negative.





<Lily Hodges - Last Filed: 23 14:11>


ROS Other: All systems not noted in ROS Statement are negative.


Constitutional: Denies: fever


Eyes: Denies: eye pain


ENT: Denies: ear pain


Respiratory: Reports: as per HPI


Cardiovascular: Reports: as per HPI, chest pain


Endocrine: Denies: fatigue


Gastrointestinal: Denies: abdominal pain


Genitourinary: Denies: urgency


Musculoskeletal: Denies: back pain


Skin: Denies: rash


Neurological: Denies: weakness





<Kang Kessler - Last Filed: 23 18:25>


ROS Statement: 


Those systems with pertinent positive or pertinent negative responses have been 

documented in the HPI.








Past Medical History


Past Medical History: Atrial Fibrillation, GERD/Reflux, Hypertension


Additional Past Medical History / Comment(s): Pt states difficulty with 

abdominal pain/nausea and vomiting when eating since surgery for hiatal 

hernia/peg tube insertion.States afib during his  hospitalization for al en 

Y/peg tube surgery, R shoulder dislocations, pt states recent EKG at Dr. Marvin's

office showed MI at some time, chronic low back pain. Removal of Peg Tube. 

States having dizziness. See Dr. Raygoza's H&P.


History of Any Multi-Drug Resistant Organisms: None Reported


Past Surgical History: Appendectomy, Back Surgery, Cholecystectomy, Hernia 

Repair, Orthopedic Surgery, Tonsillectomy


Additional Past Surgical History / Comment(s): Lysis of abdominal adhesions then

Al en Y, peg tube insertion, EGD, diaphragmatic hernia repair, lumbar fusion, 

R shoulder arthroscopy, L shoulder fusion. States his hernia was attached to his

stomach wall and this is  why all the surgery. See Dr. Raygoza's H&P.


Past Anesthesia/Blood Transfusion Reactions: No Reported Reaction


Past Psychological History: No Psychological Hx Reported


Smoking Status: Never smoker


Past Alcohol Use History: None Reported


Past Drug Use History: None Reported





- Past Family History


  ** Mother


Additional Family Medical History / Comment(s): Mother is , she had 

lupus.





  ** Father


History Unknown: Yes





  ** Brother(s)


Additional Family Medical History / Comment(s): autoimmune disorder





<Lily Hodges - Last Filed: 23 14:11>





General Exam


Limitations: no limitations





<Danyel Hodgesa - Last Filed: 23 14:11>


Limitations: no limitations


General appearance: alert, in no apparent distress


Head exam: Present: atraumatic


Eye exam: Present: normal appearance


Neck exam: Present: normal inspection


Respiratory exam: Present: normal lung sounds bilaterally.  Absent: chest wall 

tenderness


Cardiovascular Exam: Present: regular rate, normal rhythm


  ** Expanded


Peripheral pulses: 2+: Radial (R), Radial (L), Posterior Tibialis (R), Posterior

Tibialis (L), Dorsalis Pedis (R), Dorsalis Pedis (L)


GI/Abdominal exam: Present: soft.  Absent: tenderness


Extremities exam: Present: normal inspection.  Absent: pedal edema, calf 

tenderness


Neurological exam: Present: alert


Psychiatric exam: Present: normal affect, normal mood


Skin exam: Present: normal color





<Kang Kessler - Last Filed: 23 18:25>





- General Exam Comments


Initial Comments: 


Visual Physical Exam





Vital signs reviewed





General: Well-appearing, nontoxic, no acute distress.


Head: Normocephalic, atraumatic


Eyes: PERRLA, EOMI


ENT: Airway patent


Chest: Nonlabored breathing


Skin: No visual rash, normal skin tone


Neuro: Alert and oriented 3


Musculoskeletal: No gross abnormalities (Lily Hodges)





Course





<Kang Kessler - Last Filed: 23 18:25>


                                   Vital Signs











  23





  14:04


 


Temperature 98 F


 


Pulse Rate 89


 


Respiratory 20





Rate 


 


Blood Pressure 113/73


 


O2 Sat by Pulse 99





Oximetry 














- Reevaluation(s)


Reevaluation #1: 





23 16:30


EKG interpreted by myself shows sinus rhythm with a rate of 90.  Normal axis.  

Normal QRS.  No acute ST change. (Kang Kessler)





Chest Pain MDM





<Kang Kessler - Last Filed: 23 18:25>





- MDM





Was pt. sent in by a medical professional or institution (Dr. PA, NP, urgent 

care, hospital, or nursing home...) When possible be specific


@  -No


Did you speak to anyone other than the patient for history (EMS, parent, family,

police, friend...)? What history was obtained from this source 


@  -No


Did you review nursing and triage notes (agree or disagree)?  Why? 


@  -I reviewed and agree with nursing and triage notes


Were old charts reviewed (outside hosp., previous admission, EMS record, old 

EKG, old radiological studies, urgent care reports/EKG's, nursing home records)?

Report findings 


@  -No old charts were reviewed


Differential Diagnosis (chest pain, altered mental status, abdominal pain women,

abdominal pain men, vaginal bleeding, weakness, fever, dyspnea, syncope, 

headache, dizziness, GI bleed, back pain, seizure, CVA, palpatations, mental 

health)? 


@  -Differential Chest Pain:


Stable Angina, Unstable Angina, STEMI, NSTEMI Aortic Dissection, Pneumothorax, 

Musculoskeletal, Esophageal Spasm GERD, Cholecystitis, Pancreatitis, Zoster, 

this is not meant to be an all-inclusive list. 


EKG interpreted by me (3pts min.).


@  -As above


X-rays interpreted by me (1pt min.).


@  -Chest x-ray shows no acute process


CT interpreted by me (1pt min.).


@  -None done


U/S interpreted by me (1pt. min.).


@  -None done


What testing was considered but not performed or refused? (CT, X-rays, U/S, 

labs)? Why?


@  -None


What meds were considered but not given or refused? Why?


@  -None


Did you discuss the management of the patient with other professionals 

(professionals i.e. , PA, NP, lab, RT, psych nurse, , , 

teacher, , )? Give summary


@  -Case discussed with [facility] hospitalist will admit covered Dr. Marvin


Was smoking cessation discussed for >3mins.?


@  -No


Was critical care preformed (if so, how long)?


@  -No


Were there social determinants of health that impacted care today? How? 

(Homelessness, low income, unemployed, alcoholism, drug addiction, 

transportation, low edu. Level, literacy, decrease access to med. care, snf, 

rehab)?


@  -No


Was there de-escalation of care discussed even if they declined (Discuss DNR or 

withdrawal of care, Hospice)? DNR status


@  -No


What co-morbidities impacted this encounter? (DM, HTN, Smoking, COPD, CAD, 

Cancer, CVA, ARF, Chemo, Hep., AIDS, mental health diagnosis, sleep apnea, 

morbid obesity)?


@  -None


Was patient admitted / discharged? Hospital course, mention meds given and 

route, prescriptions, significant lab abnormalities, going to OR and other 

pertinent info.


@  -Patient reevaluated.  Not much improvement with the initial round of 

medications.  She will be tried with GI cocktail.  Patient will be held for 

cardiac eval.


Undiagnosed new problem with uncertain prognosis?


@  -No


Drug Therapy requiring intensive monitoring for toxicity (Heparin, Nitro, 

Insulin, Cardizem)?


@  -No


Were any procedures done?


@  -No


Diagnosis/symptom?


@  -Chest pain


Acute, or Chronic, or Acute on Chronic?


@  -Acute


Uncomplicated (without systemic symptoms) or Complicated (systemic symptoms)?


@  -default


Side effects of treatment?


@  -No


Exacerbation, Progression, or Severe Exacerbation?


@  -No


Poses a threat to life or bodily function? How? (Chest pain, USA, MI, pneumonia,

PE, COPD, DKA, ARF, appy, cholecystitis, CVA, Diverticulitis, Homicidal, 

Suicidal, threat to staff... and all critical care pts)


@  -No (Kang Kessler)





Disposition





<Lily Hodges - Last Filed: 23 14:11>


Is patient prescribed a controlled substance at d/c from ED?: No


Time of Disposition: 18:25





<Kang Kessler - Last Filed: 23 18:25>


Clinical Impression: 


 Chest pain





Disposition: ADMITTED AS IP TO THIS HOSP


Referrals: 


Tyra Marvin MD [Primary Care Provider] - 1-2 days

## 2023-08-31 ENCOUNTER — HOSPITAL ENCOUNTER (EMERGENCY)
Dept: HOSPITAL 47 - EC | Age: 43
Discharge: HOME | End: 2023-08-31
Payer: MEDICAID

## 2023-08-31 VITALS — TEMPERATURE: 98.4 F | DIASTOLIC BLOOD PRESSURE: 64 MMHG | SYSTOLIC BLOOD PRESSURE: 110 MMHG | HEART RATE: 60 BPM

## 2023-08-31 VITALS — RESPIRATION RATE: 16 BRPM

## 2023-08-31 DIAGNOSIS — K21.9: ICD-10-CM

## 2023-08-31 DIAGNOSIS — I10: ICD-10-CM

## 2023-08-31 DIAGNOSIS — Z88.5: ICD-10-CM

## 2023-08-31 DIAGNOSIS — R10.13: Primary | ICD-10-CM

## 2023-08-31 DIAGNOSIS — Z91.040: ICD-10-CM

## 2023-08-31 DIAGNOSIS — Z79.899: ICD-10-CM

## 2023-08-31 DIAGNOSIS — I48.91: ICD-10-CM

## 2023-08-31 DIAGNOSIS — Z88.8: ICD-10-CM

## 2023-08-31 LAB
ALBUMIN SERPL-MCNC: 3.9 G/DL (ref 3.5–5)
ALP SERPL-CCNC: 84 U/L (ref 38–126)
ALT SERPL-CCNC: 18 U/L (ref 4–49)
ANION GAP SERPL CALC-SCNC: 9 MMOL/L
APTT BLD: 21.3 SEC (ref 22–30)
AST SERPL-CCNC: 34 U/L (ref 17–59)
BUN SERPL-SCNC: 12 MG/DL (ref 9–20)
CALCIUM SPEC-MCNC: 8.9 MG/DL (ref 8.4–10.2)
CHLORIDE SERPL-SCNC: 108 MMOL/L (ref 98–107)
CO2 SERPL-SCNC: 23 MMOL/L (ref 22–30)
GLUCOSE SERPL-MCNC: 79 MG/DL (ref 74–99)
INR PPP: 1 (ref ?–1.2)
LIPASE SERPL-CCNC: 542 U/L (ref 23–300)
MAGNESIUM SPEC-SCNC: 2.1 MG/DL (ref 1.6–2.3)
POTASSIUM SERPL-SCNC: 3.6 MMOL/L (ref 3.5–5.1)
PROT SERPL-MCNC: 6.7 G/DL (ref 6.3–8.2)
PT BLD: 11 SEC (ref 9–12)
SODIUM SERPL-SCNC: 140 MMOL/L (ref 137–145)

## 2023-08-31 PROCEDURE — 85610 PROTHROMBIN TIME: CPT

## 2023-08-31 PROCEDURE — 85730 THROMBOPLASTIN TIME PARTIAL: CPT

## 2023-08-31 PROCEDURE — 96374 THER/PROPH/DIAG INJ IV PUSH: CPT

## 2023-08-31 PROCEDURE — 96375 TX/PRO/DX INJ NEW DRUG ADDON: CPT

## 2023-08-31 PROCEDURE — 84484 ASSAY OF TROPONIN QUANT: CPT

## 2023-08-31 PROCEDURE — 71046 X-RAY EXAM CHEST 2 VIEWS: CPT

## 2023-08-31 PROCEDURE — 80053 COMPREHEN METABOLIC PANEL: CPT

## 2023-08-31 PROCEDURE — 99285 EMERGENCY DEPT VISIT HI MDM: CPT

## 2023-08-31 PROCEDURE — 83690 ASSAY OF LIPASE: CPT

## 2023-08-31 PROCEDURE — 36415 COLL VENOUS BLD VENIPUNCTURE: CPT

## 2023-08-31 PROCEDURE — 83735 ASSAY OF MAGNESIUM: CPT

## 2023-08-31 PROCEDURE — 93005 ELECTROCARDIOGRAM TRACING: CPT

## 2023-08-31 NOTE — XR
EXAMINATION TYPE: XR chest 2V

 

DATE OF EXAM: 8/31/2023

 

COMPARISON: 6/11/2023

 

TECHNIQUE: PA and lateral views submitted.

 

HISTORY: Chest pain

 

FINDINGS:

The lungs are clear and  there is no pneumothorax, pleural effusion, or focal pneumonia.  Heart size 
normal and no overt failure. Postsurgical changes right shoulder. There is fusion of the left shoulde
r. Left lower lobe subsegmental consolidation. There is a small hiatal hernia.

 

IMPRESSION: 

1. No acute process. Small hiatal hernia. Suspect the retrocardiac density is most likely related to 
the hiatal hernia rather than pneumonia and the finding is similar to the prior exam. Correlate clini
rogelio.

## 2023-08-31 NOTE — ED
Chest Pain HPI





- General


Chief Complaint: Chest Pain


Stated Complaint: chest pain


Time Seen by Provider: 23 09:58


Source: patient, RN notes reviewed


Mode of arrival: ambulatory


Limitations: no limitations





- History of Present Illness


Initial Comments: 


43-year-old male presents emergency Department with chief complaint of chest 

pain, epigastric discomfort.  This is an ongoing chronic problem patient states 

that bothers him every once in a while.  Patient states that he has a hiatal 

hernia and which she had a prior repair but has recurred.  Patient denies any 

fevers or chills he states he has some nausea and vomiting.  Patient states he 

isn't current and antacids.  Denies any back pain no other associated symptoms.








- Related Data


                                Home Medications











 Medication  Instructions  Recorded  Confirmed


 


traZODone  mg PO HS 22


 


Eszopiclone [Lunesta] 3 mg PO HS 23


 


Famotidine [Pepcid] 20 mg PO DAILY 23


 


Omeprazole 20 mg PO DAILY 23











                                    Allergies











Allergy/AdvReac Type Severity Reaction Status Date / Time


 


latex Allergy  Rash/Hives Verified 23 09:52


 


meperidine [From Demerol] Allergy  Rash/Hives Verified 23 09:52














Review of Systems


ROS Statement: 


Those systems with pertinent positive or pertinent negative responses have been 

documented in the HPI.





ROS Other: All systems not noted in ROS Statement are negative.





EKG Findings





- EKG Comments:


EKG Findings:: EKG performed at 951 sinus bradycardia rate of 58   

QT//406





- EKG Results:


EKG: interpreted by ESTHER





Past Medical History


Past Medical History: Atrial Fibrillation, GERD/Reflux, Hypertension


Additional Past Medical History / Comment(s): Pt states difficulty with 

abdominal pain/nausea and vomiting when eating since surgery for hiatal 

hernia/peg tube insertion.States afib during his  hospitalization for carola en 

Y/peg tube surgery, R shoulder dislocations, pt states recent EKG at Dr. Marvin's

office showed MI at some time, chronic low back pain. Removal of Peg Tube. 

States having dizziness. See Dr. Raygoza's H&P.


History of Any Multi-Drug Resistant Organisms: None Reported


Past Surgical History: Appendectomy, Back Surgery, Cholecystectomy, Hernia 

Repair, Orthopedic Surgery, Tonsillectomy


Additional Past Surgical History / Comment(s): Lysis of abdominal adhesions then

Carola en Y, peg tube insertion, EGD, diaphragmatic hernia repair, lumbar fusion, 

R shoulder arthroscopy, L shoulder fusion. States his hernia was attached to his

stomach wall and this is  why all the surgery. See Dr. Raygoza's H&P.


Past Anesthesia/Blood Transfusion Reactions: No Reported Reaction


Past Psychological History: No Psychological Hx Reported


Smoking Status: Never smoker


Past Alcohol Use History: None Reported


Past Drug Use History: None Reported





- Past Family History


  ** Mother


Additional Family Medical History / Comment(s): Mother is , she had 

lupus.





  ** Father


History Unknown: Yes





  ** Brother(s)


Additional Family Medical History / Comment(s): autoimmune disorder





General Exam


Limitations: no limitations


General appearance: alert, in no apparent distress


Head exam: Present: atraumatic, normocephalic, normal inspection


Eye exam: Present: normal appearance, PERRL, EOMI.  Absent: scleral icterus, 

conjunctival injection, periorbital swelling


ENT exam: Present: normal exam, normal oropharynx, mucous membranes moist


Neck exam: Present: normal inspection, full ROM.  Absent: tenderness, 

meningismus, lymphadenopathy


Respiratory exam: Present: normal lung sounds bilaterally.  Absent: respiratory 

distress, wheezes, rales, rhonchi, stridor


Cardiovascular Exam: Present: regular rate, normal rhythm, normal heart sounds. 

Absent: systolic murmur, diastolic murmur, rubs, gallop, clicks


GI/Abdominal exam: Present: soft, tenderness, normal bowel sounds.  Absent: 

distended, guarding, rebound, rigid


Back exam: Absent: CVA tenderness (R), CVA tenderness (L)


Neurological exam: Present: alert





Course


                                   Vital Signs











  23





  09:49 11:39


 


Temperature 98.3 F 


 


Pulse Rate 62 


 


Respiratory 18 16





Rate  


 


Blood Pressure 104/67 


 


O2 Sat by Pulse 100 





Oximetry  














Chest Pain MDM





- MDM


Was pt. sent in by a medical professional or institution (, PA, NP, urgent 

care, hospital, or nursing home...) When possible be specific


@  -No


Did you speak to anyone other than the patient for history (EMS, parent, family,

police, friend...)? What history was obtained from this source 


@  -No


Did you review nursing and triage notes (agree or disagree)?  Why? 


@  -I reviewed and agree with nursing and triage notes


Were old charts reviewed (outside hosp., previous admission, EMS record, old 

EKG, old radiological studies, urgent care reports/EKG's, nursing home records)?

Report findings 


@  -Reviewed prior laboratory studies


Differential Diagnosis (chest pain, altered mental status, abdominal pain women,

abdominal pain men, vaginal bleeding, weakness, fever, dyspnea, syncope, 

headache, dizziness, GI bleed, back pain, seizure, CVA, palpatations, mental 

health, musculoskeletal)? 


@  -Differential Chest Pain:


Stable Angina, Unstable Angina, STEMI, NSTEMI Aortic Dissection, Pneumothorax, 

Musculoskeletal, Esophageal Spasm GERD, Cholecystitis, Pancreatitis, Zoster, 

this is not meant to be an all-inclusive list. 


EKG interpreted by me (3pts min.).


@  -As above


X-rays interpreted by me (1pt min.).


@  -Chest x-ray shows no acute processes.


CT interpreted by me (1pt min.).


@  -None done


U/S interpreted by me (1pt. min.).


@  -None done


What testing was considered but not performed or refused? (CT, X-rays, U/S, 

labs)? Why?


@  -None


What meds were considered but not given or refused? Why?


@  -None


Did you discuss the management of the patient with other professionals 

(professionals i.e. , PA, NP, lab, RT, psych nurse, , , 

teacher, , )? Give summary


@  -No


Was smoking cessation discussed for >3mins.?


@  -No


Was critical care preformed (if so, how long)?


@  -No


Were there social determinants of health that impacted care today? How? 

(Homelessness, low income, unemployed, alcoholism, drug addiction, 

transportation, low edu. Level, literacy, decrease access to med. care, correction, 

rehab)?


@  -No


Was there de-escalation of care discussed even if they declined (Discuss DNR or 

withdrawal of care, Hospice)? DNR status


@  -No


What co-morbidities impacted this encounter? (DM, HTN, Smoking, COPD, CAD, 

Cancer, CVA, ARF, Chemo, Hep., AIDS, mental health diagnosis, sleep apnea, 

morbid obesity)?


@  -None


Was patient admitted / discharged? Hospital course, mention meds given and 

route, prescriptions, significant lab abnormalities, going to OR and other 

pertinent info.


@  -[Discharged this is a chronic condition in nature patient has minimal 

elevated lipase, discharged otherwise unremarkable we discharged in stable 

condition return parameters were discussed.


Undiagnosed new problem with uncertain prognosis?


@  -No


Drug Therapy requiring intensive monitoring for toxicity (Heparin, Nitro, 

Insulin, Cardizem)?


@  -No


Were any procedures done?


@  -No


Diagnosis/symptom?


@  -Abdominal pain


Acute, or Chronic, or Acute on Chronic?


@  -Acute chronic


Uncomplicated (without systemic symptoms) or Complicated (systemic symptoms)?


@  -Uncomplicated


Side effects of treatment?


@  -No


Exacerbation, Progression, or Severe Exacerbation?


@  -No


Poses a threat to life or bodily function? How? (Chest pain, USA, MI, pneumonia,

 PE, COPD, DKA, ARF, appy, cholecystitis, CVA, Diverticulitis, Homicidal, 

Suicidal, threat to staff... and all critical care pts)


@  -No








Disposition


Clinical Impression: 


 Abdominal pain





Disposition: HOME SELF-CARE


Condition: Stable


Instructions (If sedation given, give patient instructions):  Abdominal Pain 

(ED)


Additional Instructions: 


Please return to the Emergency Department if symptoms worsen or any other 

concerns.


Is patient prescribed a controlled substance at d/c from ED?: No


Referrals: 


Tyra Marvin MD [Primary Care Provider] - 1-2 days


Time of Disposition: 15:11

## 2024-02-06 ENCOUNTER — HOSPITAL ENCOUNTER (EMERGENCY)
Dept: HOSPITAL 47 - EC | Age: 44
Discharge: OTHER INSTITUTION | End: 2024-02-06
Payer: COMMERCIAL

## 2024-02-06 VITALS — HEART RATE: 76 BPM | SYSTOLIC BLOOD PRESSURE: 110 MMHG | DIASTOLIC BLOOD PRESSURE: 72 MMHG | TEMPERATURE: 99 F

## 2024-02-06 VITALS — RESPIRATION RATE: 18 BRPM

## 2024-02-06 DIAGNOSIS — Z90.49: ICD-10-CM

## 2024-02-06 DIAGNOSIS — Z91.040: ICD-10-CM

## 2024-02-06 DIAGNOSIS — I10: ICD-10-CM

## 2024-02-06 DIAGNOSIS — T81.31XA: Primary | ICD-10-CM

## 2024-02-06 DIAGNOSIS — Z88.5: ICD-10-CM

## 2024-02-06 DIAGNOSIS — K44.9: ICD-10-CM

## 2024-02-06 LAB
ALBUMIN SERPL-MCNC: 3.5 G/DL (ref 3.5–5)
ALP SERPL-CCNC: 72 U/L (ref 38–126)
ALT SERPL-CCNC: 19 U/L (ref 4–49)
AMYLASE SERPL-CCNC: 73 U/L (ref 30–110)
ANION GAP SERPL CALC-SCNC: 7 MMOL/L
AST SERPL-CCNC: 22 U/L (ref 17–59)
BASOPHILS # BLD AUTO: 0 K/UL (ref 0–0.2)
BASOPHILS NFR BLD AUTO: 0 %
BUN SERPL-SCNC: 16 MG/DL (ref 9–20)
CALCIUM SPEC-MCNC: 8.8 MG/DL (ref 8.4–10.2)
CHLORIDE SERPL-SCNC: 110 MMOL/L (ref 98–107)
CO2 SERPL-SCNC: 23 MMOL/L (ref 22–30)
EOSINOPHIL # BLD AUTO: 0.2 K/UL (ref 0–0.7)
EOSINOPHIL NFR BLD AUTO: 2 %
ERYTHROCYTE [DISTWIDTH] IN BLOOD BY AUTOMATED COUNT: 4.01 M/UL (ref 4.3–5.9)
ERYTHROCYTE [DISTWIDTH] IN BLOOD: 13.8 % (ref 11.5–15.5)
GLUCOSE SERPL-MCNC: 92 MG/DL (ref 74–99)
HCT VFR BLD AUTO: 38.7 % (ref 39–53)
HGB BLD-MCNC: 12.9 GM/DL (ref 13–17.5)
LIPASE SERPL-CCNC: 179 U/L (ref 23–300)
LYMPHOCYTES # SPEC AUTO: 1.1 K/UL (ref 1–4.8)
LYMPHOCYTES NFR SPEC AUTO: 14 %
MCH RBC QN AUTO: 32.3 PG (ref 25–35)
MCHC RBC AUTO-ENTMCNC: 33.4 G/DL (ref 31–37)
MCV RBC AUTO: 96.5 FL (ref 80–100)
MONOCYTES # BLD AUTO: 0.3 K/UL (ref 0–1)
MONOCYTES NFR BLD AUTO: 4 %
NEUTROPHILS # BLD AUTO: 5.9 K/UL (ref 1.3–7.7)
NEUTROPHILS NFR BLD AUTO: 78 %
PLATELET # BLD AUTO: 398 K/UL (ref 150–450)
POTASSIUM SERPL-SCNC: 3.8 MMOL/L (ref 3.5–5.1)
PROT SERPL-MCNC: 6.3 G/DL (ref 6.3–8.2)
SODIUM SERPL-SCNC: 140 MMOL/L (ref 137–145)
WBC # BLD AUTO: 7.5 K/UL (ref 3.8–10.6)

## 2024-02-06 PROCEDURE — 96375 TX/PRO/DX INJ NEW DRUG ADDON: CPT

## 2024-02-06 PROCEDURE — 85025 COMPLETE CBC W/AUTO DIFF WBC: CPT

## 2024-02-06 PROCEDURE — 82150 ASSAY OF AMYLASE: CPT

## 2024-02-06 PROCEDURE — 96376 TX/PRO/DX INJ SAME DRUG ADON: CPT

## 2024-02-06 PROCEDURE — 96361 HYDRATE IV INFUSION ADD-ON: CPT

## 2024-02-06 PROCEDURE — 80053 COMPREHEN METABOLIC PANEL: CPT

## 2024-02-06 PROCEDURE — 83605 ASSAY OF LACTIC ACID: CPT

## 2024-02-06 PROCEDURE — 74177 CT ABD & PELVIS W/CONTRAST: CPT

## 2024-02-06 PROCEDURE — 93005 ELECTROCARDIOGRAM TRACING: CPT

## 2024-02-06 PROCEDURE — 83690 ASSAY OF LIPASE: CPT

## 2024-02-06 PROCEDURE — 36415 COLL VENOUS BLD VENIPUNCTURE: CPT

## 2024-02-06 PROCEDURE — 96374 THER/PROPH/DIAG INJ IV PUSH: CPT

## 2024-02-06 PROCEDURE — 87040 BLOOD CULTURE FOR BACTERIA: CPT

## 2024-02-06 PROCEDURE — 99285 EMERGENCY DEPT VISIT HI MDM: CPT

## 2024-02-06 NOTE — ED
General Adult HPI





- General


Chief complaint: Abdominal Pain


Stated complaint: PO SURGERY, PAIN


Time Seen by Provider: 24 12:34


Source: patient, RN notes reviewed


Mode of arrival: ambulatory


Limitations: no limitations





- History of Present Illness


Initial comments: 





Patient is a pleasant 43-year-old male presented to the emergency department 

with concerns with abdominal discomfort.  Patient is postop 3 weeks from [place] 

hernia surgery.  Patient was found to have infection during time of surgery and 

had drain placed.  Drain has been having purulent drainage.  Drain has a little 

bit more blood today.  Discomfort has increased.  Patient has decreased oral 

intake, approximately half of normal for him.  No fever.  Discomfort is 

epigastric.  Patient did have a surgery done at [facility] secondary

to stating no surgeons here would do his procedure.





- Related Data


                                Home Medications











 Medication  Instructions  Recorded  Confirmed


 


traZODone  mg PO HS 22


 


Eszopiclone [Lunesta] 3 mg PO HS 23


 


Amoxicillin/Potassium Clav 1 tab PO BID 24





[Amox-Clav 875-125 mg Tablet]   


 


Cyanocobalamin [Vitamin B-12] 500 mcg PO DAILY 24


 


Ondansetron Odt [Zofran Odt] 4 mg PO Q6H PRN 24


 


oxyCODONE HCL [Roxicodone] 5 mg PO Q6H PRN 24











                                    Allergies











Allergy/AdvReac Type Severity Reaction Status Date / Time


 


latex Allergy  Rash/Hives Verified 24 15:44


 


meperidine [From Demerol] Allergy  Rash/Hives Verified 24 15:44














Review of Systems


ROS Statement: 


Those systems with pertinent positive or pertinent negative responses have been 

documented in the HPI.





ROS Other: All systems not noted in ROS Statement are negative.


Constitutional: Denies: fever


Eyes: Denies: eye pain


ENT: Denies: ear pain


Respiratory: Denies: cough


Cardiovascular: Denies: chest pain


Gastrointestinal: Reports: as per HPI, abdominal pain


Skin: Denies: rash





Past Medical History


Past Medical History: Atrial Fibrillation, GERD/Reflux, Hypertension


Additional Past Medical History / Comment(s): Pt states difficulty with 

abdominal pain/nausea and vomiting when eating since surgery for hiatal 

hernia/peg tube insertion.States afib during his  hospitalization for al en 

Y/peg tube surgery, R shoulder dislocations, pt states recent EKG at Dr. Marvin's

office showed MI at some time, chronic low back pain. Removal of Peg Tube. 

States having dizziness. See Dr. Raygoza's H&P.


History of Any Multi-Drug Resistant Organisms: None Reported


Past Surgical History: Appendectomy, Back Surgery, Cholecystectomy, Hernia 

Repair, Orthopedic Surgery, Tonsillectomy


Additional Past Surgical History / Comment(s): Lysis of abdominal adhesions then

Al en Y, peg tube insertion, EGD, diaphragmatic hernia repair, lumbar fusion, 

R shoulder arthroscopy, L shoulder fusion. States his hernia was attached to his

stomach wall and this is  why all the surgery. See Dr. Raygoza's H&P.


Past Anesthesia/Blood Transfusion Reactions: No Reported Reaction


Past Psychological History: No Psychological Hx Reported


Smoking Status: Never smoker


Past Alcohol Use History: None Reported


Past Drug Use History: None Reported





- Past Family History


  ** Mother


Additional Family Medical History / Comment(s): Mother is , she had 

lupus.





  ** Father


History Unknown: Yes





  ** Brother(s)


Additional Family Medical History / Comment(s): autoimmune disorder





General Exam


Limitations: no limitations


General appearance: alert, in no apparent distress


Head exam: Present: normocephalic


Eye exam: Present: normal appearance


Neck exam: Present: normal inspection


Respiratory exam: Present: normal lung sounds bilaterally


Cardiovascular Exam: Present: regular rate, normal rhythm


GI/Abdominal exam: Present: soft, tenderness (Moderate epigastric tenderness.  

There is drain tube present with mild amount of serosanguineous fluid/blood), 

normal bowel sounds.  Absent: distended, guarding, rebound, rigid, pulsatile 

mass


Extremities exam: Present: normal inspection


Neurological exam: Present: alert


Psychiatric exam: Present: normal affect, normal mood


Skin exam: Present: normal color





Course


                                   Vital Signs











  24





  12:03 12:45 13:23


 


Temperature 98.5 F  


 


Pulse Rate 86 60 57 L


 


Respiratory 16 16 17





Rate   


 


Blood Pressure 96/69 99/77 97/69


 


O2 Sat by Pulse 99 100 100





Oximetry   














  24





  13:31 14:05 14:30


 


Temperature   


 


Pulse Rate 56 L 67 


 


Respiratory 16 20 





Rate   


 


Blood Pressure 100/66 113/80 112/72


 


O2 Sat by Pulse 100 100 





Oximetry   














  24





  15:06 15:40


 


Temperature  


 


Pulse Rate 65 71


 


Respiratory 19 17





Rate  


 


Blood Pressure 105/69 104/73


 


O2 Sat by Pulse 100 100





Oximetry  














EKG Findings





- EKG Results:


EKG: interpreted by ERMD (T wave inversion lead III with downward QRS), sinus 

rhythm, normal axis, normal QRS





Medical Decision Making





- Medical Decision Making





Was pt. sent in by a medical professional or institution (, PA, NP, urgent 

care, hospital, or nursing home...) When possible be specific


@  -No


Did you speak to anyone other than the patient for history (EMS, parent, family,

police, friend...)? What history was obtained from this source 


@  -No


Did you review nursing and triage notes (agree or disagree)?  Why? 


@  -I reviewed and agree with nursing and triage notes


Were old charts reviewed (outside hosp., previous admission, EMS record, old 

EKG, old radiological studies, urgent care reports/EKG's, nursing home records)?

Report findings 


@  -Previous consults reviewed


Differential Diagnosis (chest pain, altered mental status, abdominal pain women,

abdominal pain men, vaginal bleeding, weakness, fever, dyspnea, syncope, h

eadache, dizziness, GI bleed, back pain, seizure, CVA, palpatations, mental 

health, musculoskeletal)? 


@  -Differential Abdominal Pain Men:


Appendicitis, cholecystitis, diverticulosis, ischemic bowel, pancreatitis, 

hepatitis, UTI, gastroenteritis, AAA, incarcerated hernia, bowel obstruction, 

constipation, inflammatory bowel, hepatitis, peptic ulcer disease, splenic 

infarction, perforated viscus, testicular torsion, this is not meant to be an 

all-inclusive list





EKG interpreted by me (3pts min.).


@  -As above


X-rays interpreted by me (1pt min.).


@  -None done


CT interpreted by me (1pt min.).


@  -CT scan shows postoperative change


U/S interpreted by me (1pt. min.).


@  -None done


What testing was considered but not performed or refused? (CT, X-rays, U/S, 

labs)? Why?


@  -None


What meds were considered but not given or refused? Why?


@  -None


Did you discuss the management of the patient with other professionals 

(professionals i.e. , PA, NP, lab, RT, psych nurse, , , te

acher, , )? Give summary


@  -Case discussed with Dr. Maloney who recommends transfer to [facility] where patient had his surgery done.  Case was also discussed with Dr. Tahir Banegas at [facility] who will accept transfer.


Was smoking cessation discussed for >3mins.?


@  -No


Was critical care preformed (if so, how long)?


@  -No


Were there social determinants of health that impacted care today? How? 

(Homelessness, low income, unemployed, alcoholism, drug addiction, 

transportation, low edu. Level, literacy, decrease access to med. care, [facility], 

rehab)?


@  -No


Was there de-escalation of care discussed even if they declined (Discuss DNR or 

withdrawal of care, Hospice)? DNR status


@  -No


What co-morbidities impacted this encounter? (DM, HTN, Smoking, COPD, CAD, 

Cancer, CVA, ARF, Chemo, Hep., AIDS, mental health diagnosis, sleep apnea, 

morbid obesity)?


@  -None


Was patient admitted / discharged? Hospital course, mention meds given and 

route, prescriptions, significant lab abnormalities, going to OR and other 

pertinent info.


@  -Patient reevaluated and updated.  Call placed for [facility] for

transfer.  Patient on CT scan has concern for possible dehiscence of hiatal 

hernia repair site.  Patient has continued discomfort despite pain medications


Undiagnosed new problem with uncertain prognosis?


@  -No


Drug Therapy requiring intensive monitoring for toxicity (Heparin, Nitro, 

Insulin, Cardizem)?


@  -No


Were any procedures done?


@  -No


Diagnosis/symptom?


@  -Wound dehiscence, hiatal hernia


Acute, or Chronic, or Acute on Chronic?


@  -Acute


Uncomplicated (without systemic symptoms) or Complicated (systemic symptoms)?


@  -Default


Side effects of treatment?


@  -No


Exacerbation, Progression, or Severe Exacerbation?


@  -No


Poses a threat to life or bodily function? How? (Chest pain, USA, MI, pneumonia,

PE, COPD, DKA, ARF, appy, cholecystitis, CVA, Diverticulitis, Homicidal, 

Suicidal, threat to staff... and all critical care pts)


@  -No








- Lab Data


Result diagrams: 


                                 24 12:14





                                 24 13:25


                                   Lab Results











  24 Range/Units





  12:14 13:17 13:25 


 


WBC  7.5    (3.8-10.6)  k/uL


 


RBC  4.01 L    (4.30-5.90)  m/uL


 


Hgb  12.9 L    (13.0-17.5)  gm/dL


 


Hct  38.7 L    (39.0-53.0)  %


 


MCV  96.5    (80.0-100.0)  fL


 


MCH  32.3    (25.0-35.0)  pg


 


MCHC  33.4    (31.0-37.0)  g/dL


 


RDW  13.8    (11.5-15.5)  %


 


Plt Count  398    (150-450)  k/uL


 


MPV  7.4    


 


Neutrophils %  78    %


 


Lymphocytes %  14    %


 


Monocytes %  4    %


 


Eosinophils %  2    %


 


Basophils %  0    %


 


Neutrophils #  5.9    (1.3-7.7)  k/uL


 


Lymphocytes #  1.1    (1.0-4.8)  k/uL


 


Monocytes #  0.3    (0-1.0)  k/uL


 


Eosinophils #  0.2    (0-0.7)  k/uL


 


Basophils #  0.0    (0-0.2)  k/uL


 


Sodium    140  (137-145)  mmol/L


 


Potassium    3.8  (3.5-5.1)  mmol/L


 


Chloride    110 H  ()  mmol/L


 


Carbon Dioxide    23  (22-30)  mmol/L


 


Anion Gap    7  mmol/L


 


BUN    16  (9-20)  mg/dL


 


Creatinine    0.65 L  (0.66-1.25)  mg/dL


 


Est GFR (CKD-EPI)AfAm    >90  (>60 ml/min/1.73 sqM)  


 


Est GFR (CKD-EPI)NonAf    >90  (>60 ml/min/1.73 sqM)  


 


Glucose    92  (74-99)  mg/dL


 


Plasma Lactic Acid Frankie   1.5   (0.7-2.0)  mmol/L


 


Calcium    8.8  (8.4-10.2)  mg/dL


 


Total Bilirubin    0.4  (0.2-1.3)  mg/dL


 


AST    22  (17-59)  U/L


 


ALT    19  (4-49)  U/L


 


Alkaline Phosphatase    72  ()  U/L


 


Total Protein    6.3  (6.3-8.2)  g/dL


 


Albumin    3.5  (3.5-5.0)  g/dL


 


Amylase    73  ()  U/L


 


Lipase    179  ()  U/L














Disposition


Clinical Impression: 


 Wound dehiscence, surgical, Hiatal hernia





Disposition: OTHER INSTITUTION NOT DEFINED


Is patient prescribed a controlled substance at d/c from ED?: No


Referrals: 


Tyra Marvin MD [Primary Care Provider] - 1-2 days


Time of Disposition: 16:21





- Out of Hospital Transfer - Req. Specs


Out of Hospital Transfer - Requested Specifics: Other Emergency Center

## 2024-02-06 NOTE — CT
EXAMINATION TYPE: CT abdomen pelvis w con

CT DLP: 849.1 mGycm, Automated exposure control for dose reduction was used.

 

DATE OF EXAM: 2/6/2024 2:09 PM

 

COMPARISON: 9/22/2023

 

CLINICAL INDICATION:Male, 43 years old with history of bariatric protocol, abp; Abdominal pain and na
usea xfew days. Recent hiatal hernia surgery 1/15/24.

 

TECHNIQUE:  Axial CT abdomen pelvis w con;Sagittal and coronal reformats were created on a separate w
orkstation. 

 

Contrast used:100ml mL of Isovue 300 with IV Contrast, (none if empty)

Oral contrast used: with Oral Contrast (none if empty)

 

FINDINGS: 

LOWER CHEST: Unremarkable

 

ABDOMEN

LIVER: Unremarkable

GALLBLADDER AND BILE DUCTS: Unremarkable.

PANCREAS: Unremarkable.

SPLEEN: Unremarkable.

ADRENAL GLANDS: Unremarkable.

KIDNEYS AND URETERS: No evidence of hydronephrosis or renal calculus. The ureters are unremarkable.  


 

PELVIS

BLADDER: Unremarkable

REPRODUCTIVE: Unremarkable.

 

ABDOMEN & PELVIS

STOMACH AND BOWEL: No evidence of bowel obstruction. Postsurgical changes at the gastroesophageal nate
ction. No evidence for pneumoperitoneum. Moderate to large amount stool throughout the colon. There i
s a moderate to large hiatal hernia.

PERITONEUM/RETROPERITONEUM: No evidence of pneumoperitoneum or free fluid. No organizing fluid collec
tions in the upper abdomen.

VASCULATURE: No evidence of aortic aneurysm. 

MUSCULOSKELETAL: No acute osseous abnormalities, fixation hardware L5-S1 appears intact.

LYMPH NODES: No gross evidence for lymphadenopathy.

SOFT TISSUE/ABDOMINAL WALL: Surgical changes to the anterior abdominal wall. Drainage catheter enters
 the upper abdomen and terminates in the gastroesophageal junction.

 

IMPRESSION:

Moderate to large hiatal hernia with postsurgical changes at the gastroesophageal junction. No eviden
ce for pneumoperitoneum. Correlate for dehiscence at the hiatal hernia repair site. Abdominal cathete
r terminates near the gastroesophageal junction. There is a large amount stool in the colon. No organ
izing fluid collections.

## 2024-03-06 ENCOUNTER — HOSPITAL ENCOUNTER (EMERGENCY)
Dept: HOSPITAL 47 - EC | Age: 44
Discharge: HOME | End: 2024-03-06
Payer: COMMERCIAL

## 2024-03-06 VITALS — HEART RATE: 78 BPM | RESPIRATION RATE: 18 BRPM | SYSTOLIC BLOOD PRESSURE: 118 MMHG | DIASTOLIC BLOOD PRESSURE: 74 MMHG

## 2024-03-06 VITALS — TEMPERATURE: 99.6 F

## 2024-03-06 DIAGNOSIS — Z88.5: ICD-10-CM

## 2024-03-06 DIAGNOSIS — I48.91: ICD-10-CM

## 2024-03-06 DIAGNOSIS — I10: ICD-10-CM

## 2024-03-06 DIAGNOSIS — R10.13: Primary | ICD-10-CM

## 2024-03-06 DIAGNOSIS — Z91.040: ICD-10-CM

## 2024-03-06 LAB
ALBUMIN SERPL-MCNC: 3.8 G/DL (ref 3.5–5)
ALP SERPL-CCNC: 168 U/L (ref 38–126)
ALT SERPL-CCNC: 43 U/L (ref 4–49)
AMYLASE SERPL-CCNC: 65 U/L (ref 30–110)
ANION GAP SERPL CALC-SCNC: 7 MMOL/L
AST SERPL-CCNC: 39 U/L (ref 17–59)
BASOPHILS # BLD AUTO: 0 K/UL (ref 0–0.2)
BASOPHILS NFR BLD AUTO: 0 %
BUN SERPL-SCNC: 13 MG/DL (ref 9–20)
CALCIUM SPEC-MCNC: 9 MG/DL (ref 8.4–10.2)
CHLORIDE SERPL-SCNC: 108 MMOL/L (ref 98–107)
CO2 SERPL-SCNC: 20 MMOL/L (ref 22–30)
EOSINOPHIL # BLD AUTO: 0.1 K/UL (ref 0–0.7)
EOSINOPHIL NFR BLD AUTO: 2 %
ERYTHROCYTE [DISTWIDTH] IN BLOOD BY AUTOMATED COUNT: 3.86 M/UL (ref 4.3–5.9)
ERYTHROCYTE [DISTWIDTH] IN BLOOD: 13.5 % (ref 11.5–15.5)
GLUCOSE SERPL-MCNC: 90 MG/DL (ref 74–99)
HCT VFR BLD AUTO: 37.7 % (ref 39–53)
HGB BLD-MCNC: 12.7 GM/DL (ref 13–17.5)
LIPASE SERPL-CCNC: 79 U/L (ref 23–300)
LYMPHOCYTES # SPEC AUTO: 0.2 K/UL (ref 1–4.8)
LYMPHOCYTES NFR SPEC AUTO: 8 %
MCH RBC QN AUTO: 32.9 PG (ref 25–35)
MCHC RBC AUTO-ENTMCNC: 33.7 G/DL (ref 31–37)
MCV RBC AUTO: 97.8 FL (ref 80–100)
MONOCYTES # BLD AUTO: 0.2 K/UL (ref 0–1)
MONOCYTES NFR BLD AUTO: 8 %
NEUTROPHILS # BLD AUTO: 2.2 K/UL (ref 1.3–7.7)
NEUTROPHILS NFR BLD AUTO: 79 %
PLATELET # BLD AUTO: 209 K/UL (ref 150–450)
POTASSIUM SERPL-SCNC: 4.5 MMOL/L (ref 3.5–5.1)
PROT SERPL-MCNC: 6.5 G/DL (ref 6.3–8.2)
SODIUM SERPL-SCNC: 135 MMOL/L (ref 137–145)
WBC # BLD AUTO: 2.8 K/UL (ref 3.8–10.6)

## 2024-03-06 PROCEDURE — 96372 THER/PROPH/DIAG INJ SC/IM: CPT

## 2024-03-06 PROCEDURE — 83605 ASSAY OF LACTIC ACID: CPT

## 2024-03-06 PROCEDURE — 82150 ASSAY OF AMYLASE: CPT

## 2024-03-06 PROCEDURE — 74177 CT ABD & PELVIS W/CONTRAST: CPT

## 2024-03-06 PROCEDURE — 85025 COMPLETE CBC W/AUTO DIFF WBC: CPT

## 2024-03-06 PROCEDURE — 96361 HYDRATE IV INFUSION ADD-ON: CPT

## 2024-03-06 PROCEDURE — 36415 COLL VENOUS BLD VENIPUNCTURE: CPT

## 2024-03-06 PROCEDURE — 96374 THER/PROPH/DIAG INJ IV PUSH: CPT

## 2024-03-06 PROCEDURE — 99284 EMERGENCY DEPT VISIT MOD MDM: CPT

## 2024-03-06 PROCEDURE — 96375 TX/PRO/DX INJ NEW DRUG ADDON: CPT

## 2024-03-06 PROCEDURE — 80053 COMPREHEN METABOLIC PANEL: CPT

## 2024-03-06 PROCEDURE — 83690 ASSAY OF LIPASE: CPT

## 2024-03-06 RX ADMIN — ONDANSETRON STA MG: 4 TABLET, ORALLY DISINTEGRATING ORAL at 14:44

## 2024-03-06 RX ADMIN — HYDROMORPHONE HYDROCHLORIDE STA MG: 1 INJECTION, SOLUTION INTRAMUSCULAR; INTRAVENOUS; SUBCUTANEOUS at 14:43

## 2024-03-06 RX ADMIN — ONDANSETRON STA: 2 INJECTION INTRAMUSCULAR; INTRAVENOUS at 14:45

## 2024-03-06 RX ADMIN — HYDROMORPHONE HYDROCHLORIDE STA: 1 INJECTION, SOLUTION INTRAMUSCULAR; INTRAVENOUS; SUBCUTANEOUS at 14:44

## 2024-03-06 RX ADMIN — HYDROMORPHONE HYDROCHLORIDE STA MG: 1 INJECTION, SOLUTION INTRAMUSCULAR; INTRAVENOUS; SUBCUTANEOUS at 17:27

## 2024-03-06 RX ADMIN — NICARDIPINE HYDROCHLORIDE ONE MLS/HR: 2.5 INJECTION INTRAVENOUS at 15:27

## 2024-03-06 RX ADMIN — CEFAZOLIN ONE MLS/HR: 330 INJECTION, POWDER, FOR SOLUTION INTRAMUSCULAR; INTRAVENOUS at 15:27

## 2024-03-06 RX ADMIN — ONDANSETRON STA MG: 2 INJECTION INTRAMUSCULAR; INTRAVENOUS at 17:32

## 2024-03-06 NOTE — ED
Abdominal Pain HPI





- General


Source: patient, RN notes reviewed


Mode of arrival: ambulatory


Limitations: no limitations





- History of Present Illness


MD Complaint: abdominal pain





<Josselin Underwood - Last Filed: 24 13:01>





<Billy Harding - Last Filed: 24 19:08>





- General


Chief Complaint: Abdominal Pain


Stated Complaint: Hernia, vomitting, chest pain


Time Seen by Provider: 24 13:02





- History of Present Illness


Initial Comments: 


Quick Note: This is a 43-year-old male who presents to the emergency department 

for abdominal pain, nausea, and vomiting.  States that he has a hernia attached 

to his stomach and in his chest.  He follows with thoracic surgery at the 

[facility].  He has had increasing pain, nausea, and vomiting over 

the last 2 days.  He called his surgeon, who advised he come to the emergency 

department and discuss transfer to [facility].


 (Josselin Underwood)


This is a 43-year-old male who presents to the emergency department complaining 

of abdominal pain nausea and vomiting for the last week.  Patient states he had 

multiple abdominal surgeries.  Patient states he thinks he might need another 

abdominal surgery but he just saw his doctor on Friday and called his physician 

today and the physician told him to come back to the emergency department to be 

evaluated.  Patient denies any chest pain difficulty breathing or shortness of 

breath.  Patient has any back pain.  Patient states is the same pain he always 

seems to get. (Billy Harding)





- Related Data


                                Home Medications











 Medication  Instructions  Recorded  Confirmed


 


traZODone  mg PO HS 22


 


Eszopiclone [Lunesta] 3 mg PO HS 23


 


Amoxicillin/Potassium Clav 1 tab PO BID 24





[Amox-Clav 875-125 mg Tablet]   


 


Cyanocobalamin [Vitamin B-12] 500 mcg PO DAILY 24


 


Ondansetron Odt [Zofran Odt] 4 mg PO Q6H PRN 24


 


oxyCODONE HCL [Roxicodone] 5 mg PO Q6H PRN 24











                                    Allergies











Allergy/AdvReac Type Severity Reaction Status Date / Time


 


latex Allergy  Rash/Hives Verified 24 12:04


 


meperidine [From Demerol] Allergy  Rash/Hives Verified 24 12:04














Review of Systems


ROS Other: All systems not noted in ROS Statement are negative.





<Josselin Underwood - Last Filed: 24 13:01>


ROS Other: All systems not noted in ROS Statement are negative.





<Billy Harding - Last Filed: 24 19:08>


ROS Statement: 


Those systems with pertinent positive or pertinent negative responses have been 

documented in the HPI.








Past Medical History


Past Medical History: Atrial Fibrillation, GERD/Reflux, Hypertension


Additional Past Medical History / Comment(s): Pt states difficulty with 

abdominal pain/nausea and vomiting when eating since surgery for hiatal 

hernia/peg tube insertion.States afib during his  hospitalization for al en 

Y/peg tube surgery, R shoulder dislocations, pt states recent EKG at Dr. Marvin's

office showed MI at some time, chronic low back pain. Removal of Peg Tube. 

States having dizziness. See Dr. Raygoza's H&P.  infection in stomach


History of Any Multi-Drug Resistant Organisms: None Reported


Past Surgical History: Appendectomy, Back Surgery, Cholecystectomy, Hernia 

Repair, Orthopedic Surgery, Tonsillectomy


Additional Past Surgical History / Comment(s): Lysis of abdominal adhesions then

Al en Y, peg tube insertion, EGD, diaphragmatic hernia repair, lumbar fusion, 

R shoulder arthroscopy, L shoulder fusion. States his hernia was attached to his

stomach wall and this is  why all the surgery. See Dr. Raygoza's H&P.


Past Anesthesia/Blood Transfusion Reactions: No Reported Reaction


Past Psychological History: No Psychological Hx Reported


Smoking Status: Never smoker


Past Alcohol Use History: None Reported


Past Drug Use History: None Reported





- Past Family History


  ** Mother


Additional Family Medical History / Comment(s): Mother is , she had 

lupus.





  ** Father


History Unknown: Yes





  ** Brother(s)


Additional Family Medical History / Comment(s): autoimmune disorder





<Josselin Underwood - Last Filed: 24 13:01>





General Exam





<Josselin Underwood - Last Filed: 24 13:01>





<Billy Harding - Last Filed: 24 19:08>





- General Exam Comments


Initial Comments: 


Visual Physical Exam





Vital signs reviewed





General: Well-appearing, nontoxic, no acute distress.


Head: Normocephalic, atraumatic


Eyes: PERRLA, EOMI


ENT: Airway patent


Chest: Nonlabored breathing


Skin: No visual rash, normal skin tone


Neuro: Alert and oriented 3


Musculoskeletal: No gross abnormalities


 (Josselin Underwood)


GENERAL:


Patient is well-developed and well-nourished.  Patient is nontoxic and well-

hydrated and is in mild distress.





ENT:


Neck is soft and supple.  No significant lymphadenopathy is noted.  Oropharynx 

is clear.  Moist mucous membranes.  Neck has full range of motion without 

eliciting any pain.  T





EYES:


The sclera were anicteric and conjunctiva were pink and moist.  Extraocular 

movements were intact and pupils were equal round and reactive to light.  

Eyelids were unremarkable.





PULMONARY:


Unlabored respirations.  Good breath sounds bilaterally.  No audible rales 

rhonchi or wheezing was noted.





CARDIOVASCULAR:


There is a regular rate and rhythm without any murmurs gallops or rubs.  





ABDOMEN:


Patient has mild epigastric abdominal pain with no rebound





SKIN:


Skin is clear with no lesions or rashes and otherwise unremarkable.





NEUROLOGIC:


Patient is alert and oriented x3.  Cranial nerves II through XII are grossly 

intact.  Motor and sensory are also intact.  Normal speech, volume and content. 

Symmetrical smile.  





MUSCULOSKELETAL:


Normal extremities with adequate strength and full range of motion.  No lower 

extremity swelling or edema.  No calf tenderness.





LYMPHATICS:


No significant lymphadenopathy is noted





PSYCHIATRIC:


Normal psychiatric evaluation.  (Billy Harding)





Course





                                   Vital Signs











  24





  11:57 17:20


 


Temperature 98.5 F 99.6 F


 


Pulse Rate 102 H 80


 


Respiratory 18 14





Rate  


 


Blood Pressure 96/63 106/60


 


O2 Sat by Pulse 96 98





Oximetry  














Medical Decision Making





<Josselin Underwood - Last Filed: 24 13:01>





- Lab Data


Result diagrams: 


                                 24 13:24





                                 24 13:24





<Billy Harding - Last Filed: 24 19:08>





- Medical Decision Making


I performed the QuickNote portion of this chart. Signed Josselin Underwood PA-C.


 (Josselin Underwood)


Was pt. sent in by a medical professional or institution ([facility], NP, urgent 

care, hospital, or nursing home...) When possible be specific


@ -Patient states his surgeon sent him to the emergency department


Did you speak to anyone other than the patient for history (EMS, parent, family,

police, friend...)? What history was obtained from this source 


@ -No


Did you review nursing and triage notes (agree or disagree)? Why? 


@ -I reviewed and agree with nursing and triage notes


Were old charts reviewed (outside hosp., previous admission, EMS record, old 

EKG, old radiological studies, urgent care reports/EKG's, nursing home records)?

Report findings 


@ -No old charts were reviewed


Differential Diagnosis (chest pain, altered mental status, abdominal pain women,

abdominal pain men, vaginal bleeding, weakness, fever, dyspnea, syncope, 

headache, dizziness, GI bleed, back pain, seizure, CVA, palpatations, mental 

health, musculoskeletal)? 


@ -Differential Abdominal Pain Men:


Appendicitis, cholecystitis, diverticulosis, ischemic bowel, pancreatitis, 

hepatitis, UTI, gastroenteritis, AAA, incarcerated hernia, bowel obstruction, 

constipation, inflammatory bowel, hepatitis, peptic ulcer disease, splenic 

infarction, perforated viscus, testicular torsion, this is not meant to be an 

all-inclusive list





EKG interpreted by me (3pts min.).


@ -As above


X-rays interpreted by me (1pt min.).


@ -No


CT interpreted by me (1pt min.).


@ -CT of the abdomen pelvis showed no acute abnormality there is no free air and

there is a surgical site at the GE junction


U/S interpreted by me (1pt. min.).


@ -None done


What testing was considered but not performed or refused? (CT, X-rays, U/S, 

labs)? Why?


@ -None


What meds were considered but not given or refused? Why?


@ -None


Did you discuss the management of the patient with other professionals 

(professionals i.e. [facility], NP, lab, RT, psych nurse, , , 

teacher, , )? Give summary


@ -I spoke with the patient's surgeon at [facility] Dr. Callaway.  

The surgeon said that he said he often is unable to figure out the patient's 

pain and felt as though the patient to be discharged home with the lab work was 

within normal range.


Was smoking cessation discussed for >3mins.?


@ -No


Was critical care preformed (if so, how long)?


@ -No


Were there social determinants of health that impacted care today? How? (Ho

melessness, low income, unemployed, alcoholism, drug addiction, transportation, 

low edu. Level, literacy, decrease access to med. care, [facility], rehab)?


@ -No


Was there de-escalation of care discussed even if they declined (Discuss DNR or 

withdrawal of care, Hospice)? DNR status


@ -No


What co-morbidities impacted this encounter? (DM, HTN, Smoking, COPD, CAD, 

Cancer, CVA, ARF, Chemo, Hep., AIDS, mental health diagnosis, sleep apnea, 

morbid obesity)?


@ -None


Was patient admitted / discharged? Hospital course, mention meds given and 

route, prescriptions, significant lab abnormalities, going to OR and other 

pertinent info.


@ -Patient did not vomit while in the emergency department.  He did continue to 

complain about some abdominal pain.  Patient was given pain medications.  

Patient was given Zofran.  I went by to evaluate the patient on multiple 

occasions and he was not vomiting and never in any distress.  Patient was okay 

with going home and states that he will follow-up with his surgeon.  Patient 

also received a liter and a half of fluid while in the emergency department


Undiagnosed new problem with uncertain prognosis?


@ -No


Drug Therapy requiring intensive monitoring for toxicity (Heparin, Nitro, 

Insulin, Cardizem)?


@ -No


Were any procedures done?


@ -No


Diagnosis/symptom?


@ -Abdominal pain


Acute, or Chronic, or Acute on Chronic?


@ -Chronic


Uncomplicated (without systemic symptoms) or Complicated (systemic symptoms)?


@ -Complicated


Side effects of treatment?


@ -No


Exacerbation, Progression, or Severe Exacerbation?


@ -No


Poses a threat to life or bodily function? How? (Chest pain, USA, MI, pneumonia,

PE, COPD, DKA, ARF, appy, cholecystitis, CVA, Diverticulitis, Homicidal, Suic

idal, threat to staff... and all critical care pts)


@ -No (Billy Harding)





- Lab Data





                                   Lab Results











  24 Range/Units





  13:24 13:24 13:24 


 


WBC  2.8 L    (3.8-10.6)  k/uL


 


RBC  3.86 L    (4.30-5.90)  m/uL


 


Hgb  12.7 L    (13.0-17.5)  gm/dL


 


Hct  37.7 L    (39.0-53.0)  %


 


MCV  97.8    (80.0-100.0)  fL


 


MCH  32.9    (25.0-35.0)  pg


 


MCHC  33.7    (31.0-37.0)  g/dL


 


RDW  13.5    (11.5-15.5)  %


 


Plt Count  209    (150-450)  k/uL


 


MPV  7.6    


 


Neutrophils %  79    %


 


Lymphocytes %  8    %


 


Monocytes %  8    %


 


Eosinophils %  2    %


 


Basophils %  0    %


 


Neutrophils #  2.2    (1.3-7.7)  k/uL


 


Lymphocytes #  0.2 L    (1.0-4.8)  k/uL


 


Monocytes #  0.2    (0-1.0)  k/uL


 


Eosinophils #  0.1    (0-0.7)  k/uL


 


Basophils #  0.0    (0-0.2)  k/uL


 


Sodium   135 L   (137-145)  mmol/L


 


Potassium   4.5   (3.5-5.1)  mmol/L


 


Chloride   108 H   ()  mmol/L


 


Carbon Dioxide   20 L   (22-30)  mmol/L


 


Anion Gap   7   mmol/L


 


BUN   13   (9-20)  mg/dL


 


Creatinine   0.65 L   (0.66-1.25)  mg/dL


 


Est GFR (CKD-EPI)AfAm   >90   (>60 ml/min/1.73 sqM)  


 


Est GFR (CKD-EPI)NonAf   >90   (>60 ml/min/1.73 sqM)  


 


Glucose   90   (74-99)  mg/dL


 


Plasma Lactic Acid Frankie    1.9  (0.7-2.0)  mmol/L


 


Calcium   9.0   (8.4-10.2)  mg/dL


 


Total Bilirubin   0.6   (0.2-1.3)  mg/dL


 


AST   39   (17-59)  U/L


 


ALT   43   (4-49)  U/L


 


Alkaline Phosphatase   168 H   ()  U/L


 


Total Protein   6.5   (6.3-8.2)  g/dL


 


Albumin   3.8   (3.5-5.0)  g/dL


 


Amylase   65   ()  U/L


 


Lipase   79   ()  U/L














Disposition





<Josselin Underwood - Last Filed: 24 13:01>


Is patient prescribed a controlled substance at d/c from ED?: No


Time of Disposition: 19:08





<Billy Harding - Last Filed: 24 19:08>


Clinical Impression: 


 Abdominal pain





Disposition: HOME SELF-CARE


Condition: Good


Instructions (If sedation given, give patient instructions):  Abdominal Pain 

(ED)


Additional Instructions: 


Patient should call his surgeon tomorrow and follow-up with him as directed


Referrals: 


Tyra Marvin MD [Primary Care Provider] - 1-2 days

## 2024-03-06 NOTE — CT
EXAMINATION TYPE: CT abdomen pelvis w con

 

DATE OF EXAM: 3/6/2024

 

COMPARISON: 2/6/2024

 

HISTORY: abdominal pain, nausea, vomiting. prior on pacs

 

CT DLP: 930.9 mGycm

 

CONTRAST: 

CT scan of the abdomen and pelvis is performed without Oral Contrast and with IV Contrast, patient in
jected with 90ml mL of Isovue 300.

 

FINDINGS: 

LUNG BASES-: No visible nodule.  No infiltrate. 

 

LIVER/GB:   No calcified gallstones.  No space occupying hepatic lesion. Biliary tree is of normal ca
liber. 

 

PANCREAS:  No inflammation.  No distinct mass. 

 

SPLEEN:  No splenic enlargement.  No lesion seen. 

 

ADRENALS:  No nodule.  No thickening. 

 

KIDNEYS/BLADDER:  No hydronephrosis.  No nephrolithiasis.  No distinct renal mass.  Urinary bladder g
rossly unremarkable. 

 

BOWEL: Moderate to large hiatal hernia is redemonstrated with postsurgical changes at the GE junction
. Again correlate for dehiscence at the hiatal hernia repair site. No obvious leak although contrast 
was not administered. No evidence of pneumoperitoneum. Moderate fecal burden. Epigastric catheter see
n previously has been removed. There is no evidence for abscess or unusual collection. No evidence of
 bowel dilatation or wall thickening.

 

GENITAL ORGANS:  No gross abnormality. 

 

LYMPH NODES:  No greater than 1cm abdominal or pelvic lymph nodes are appreciated.

 

AORTA: No significant abnormality. 

 

OSSEOUS STRUCTURES:  No significant abnormality is seen. 

 

OTHER:  No significant additional abnormality is seen. 

 

IMPRESSION: 

1. Moderate to large hiatal hernia is redemonstrated with postsurgical changes at the GE junction. Ag
ain correlate for dehiscence at the hiatal hernia repair site. No obvious leak although contrast was 
not administered. No evidence of pneumoperitoneum. 

2. No acute process seen

## 2024-04-10 ENCOUNTER — HOSPITAL ENCOUNTER (EMERGENCY)
Dept: HOSPITAL 47 - EC | Age: 44
Discharge: HOME | End: 2024-04-10
Payer: COMMERCIAL

## 2024-04-10 VITALS — TEMPERATURE: 97.7 F

## 2024-04-10 VITALS — RESPIRATION RATE: 16 BRPM | SYSTOLIC BLOOD PRESSURE: 109 MMHG | DIASTOLIC BLOOD PRESSURE: 76 MMHG | HEART RATE: 70 BPM

## 2024-04-10 DIAGNOSIS — K56.7: ICD-10-CM

## 2024-04-10 DIAGNOSIS — Z91.040: ICD-10-CM

## 2024-04-10 DIAGNOSIS — R74.01: ICD-10-CM

## 2024-04-10 DIAGNOSIS — Z88.5: ICD-10-CM

## 2024-04-10 DIAGNOSIS — K44.9: Primary | ICD-10-CM

## 2024-04-10 LAB
ALBUMIN SERPL-MCNC: 4.7 G/DL (ref 3.5–5)
ALP SERPL-CCNC: 79 U/L (ref 38–126)
ALT SERPL-CCNC: 26 U/L (ref 4–49)
AMYLASE SERPL-CCNC: 75 U/L (ref 30–110)
ANION GAP SERPL CALC-SCNC: 14 MMOL/L
APTT BLD: 20.6 SEC (ref 22–30)
AST SERPL-CCNC: 32 U/L (ref 17–59)
BASOPHILS # BLD AUTO: 0 K/UL (ref 0–0.2)
BASOPHILS NFR BLD AUTO: 1 %
BUN SERPL-SCNC: 15 MG/DL (ref 9–20)
CALCIUM SPEC-MCNC: 9.7 MG/DL (ref 8.4–10.2)
CHLORIDE SERPL-SCNC: 107 MMOL/L (ref 98–107)
CO2 SERPL-SCNC: 21 MMOL/L (ref 22–30)
EOSINOPHIL # BLD AUTO: 0.1 K/UL (ref 0–0.7)
EOSINOPHIL NFR BLD AUTO: 2 %
ERYTHROCYTE [DISTWIDTH] IN BLOOD BY AUTOMATED COUNT: 4.61 M/UL (ref 4.3–5.9)
ERYTHROCYTE [DISTWIDTH] IN BLOOD: 13.6 % (ref 11.5–15.5)
GLUCOSE SERPL-MCNC: 110 MG/DL (ref 74–99)
HCT VFR BLD AUTO: 44.8 % (ref 39–53)
HGB BLD-MCNC: 14.5 GM/DL (ref 13–17.5)
INR PPP: 0.9 (ref ?–1.2)
LIPASE SERPL-CCNC: 181 U/L (ref 23–300)
LYMPHOCYTES # SPEC AUTO: 1.2 K/UL (ref 1–4.8)
LYMPHOCYTES NFR SPEC AUTO: 31 %
MCH RBC QN AUTO: 31.4 PG (ref 25–35)
MCHC RBC AUTO-ENTMCNC: 32.4 G/DL (ref 31–37)
MCV RBC AUTO: 97.1 FL (ref 80–100)
MONOCYTES # BLD AUTO: 0.2 K/UL (ref 0–1)
MONOCYTES NFR BLD AUTO: 6 %
NEUTROPHILS # BLD AUTO: 2.1 K/UL (ref 1.3–7.7)
NEUTROPHILS NFR BLD AUTO: 58 %
PLATELET # BLD AUTO: 219 K/UL (ref 150–450)
POTASSIUM SERPL-SCNC: 4.6 MMOL/L (ref 3.5–5.1)
PROT SERPL-MCNC: 7.1 G/DL (ref 6.3–8.2)
PT BLD: 10.3 SEC (ref 10–12.5)
SODIUM SERPL-SCNC: 142 MMOL/L (ref 137–145)
WBC # BLD AUTO: 3.7 K/UL (ref 3.8–10.6)

## 2024-04-10 PROCEDURE — 83605 ASSAY OF LACTIC ACID: CPT

## 2024-04-10 PROCEDURE — 74018 RADEX ABDOMEN 1 VIEW: CPT

## 2024-04-10 PROCEDURE — 85730 THROMBOPLASTIN TIME PARTIAL: CPT

## 2024-04-10 PROCEDURE — 96375 TX/PRO/DX INJ NEW DRUG ADDON: CPT

## 2024-04-10 PROCEDURE — 93005 ELECTROCARDIOGRAM TRACING: CPT

## 2024-04-10 PROCEDURE — 85610 PROTHROMBIN TIME: CPT

## 2024-04-10 PROCEDURE — 96376 TX/PRO/DX INJ SAME DRUG ADON: CPT

## 2024-04-10 PROCEDURE — 85025 COMPLETE CBC W/AUTO DIFF WBC: CPT

## 2024-04-10 PROCEDURE — 74177 CT ABD & PELVIS W/CONTRAST: CPT

## 2024-04-10 PROCEDURE — 80053 COMPREHEN METABOLIC PANEL: CPT

## 2024-04-10 PROCEDURE — 96374 THER/PROPH/DIAG INJ IV PUSH: CPT

## 2024-04-10 PROCEDURE — 99285 EMERGENCY DEPT VISIT HI MDM: CPT

## 2024-04-10 PROCEDURE — 83690 ASSAY OF LIPASE: CPT

## 2024-04-10 PROCEDURE — 82150 ASSAY OF AMYLASE: CPT

## 2024-04-10 PROCEDURE — 36415 COLL VENOUS BLD VENIPUNCTURE: CPT

## 2024-04-10 RX ADMIN — ACETAMINOPHEN AND CODEINE PHOSPHATE STA EACH: 300; 30 TABLET ORAL at 13:08

## 2024-04-10 RX ADMIN — HYDROMORPHONE HYDROCHLORIDE STA MG: 1 INJECTION, SOLUTION INTRAMUSCULAR; INTRAVENOUS; SUBCUTANEOUS at 10:49

## 2024-04-10 RX ADMIN — FAMOTIDINE STA MG: 10 INJECTION, SOLUTION INTRAVENOUS at 11:29

## 2024-04-10 RX ADMIN — PANTOPRAZOLE SODIUM STA MG: 40 INJECTION, POWDER, FOR SOLUTION INTRAVENOUS at 11:29

## 2024-04-10 RX ADMIN — ACETAMINOPHEN STA ML: 160 SOLUTION ORAL at 11:28

## 2024-04-10 RX ADMIN — HYDROMORPHONE HYDROCHLORIDE STA MG: 1 INJECTION, SOLUTION INTRAMUSCULAR; INTRAVENOUS; SUBCUTANEOUS at 12:09

## 2024-04-10 RX ADMIN — ONDANSETRON STA MG: 2 INJECTION INTRAMUSCULAR; INTRAVENOUS at 10:48

## 2024-04-10 NOTE — CT
EXAMINATION TYPE: CT abdomen pelvis w con

 

DATE OF EXAM: 4/10/2024

 

COMPARISON: 3/6/2024

 

INDICATION: Abdominal pain

 

DLP: 869.6 mGycm, Automated exposure control for dose reduction was used.

 

CONTRAST:  100 ml mL of Isovue 300. 

                        Study performed without Oral Contrast

 

TECHNIQUE: Axial images were obtained from above the diaphragm to the pubic rami in the axial plane a
t 5 mm thick sections.  Reconstructed images are reviewed on the computer in the coronal plane.  

 

FINDINGS:

 

Limited CT sections are obtained the lung bases.  The lung bases are clear.  There is a moderate size
 hiatal hernia present. Postsurgical changes are within the stomach.

 

CT ABDOMEN:

 

Liver: Normal

 

Spleen: Normal

 

Pancreas: Normal

 

Adrenal glands: The adrenal glands are normal.

 

Gallbladder: Not identified. Correlate with surgical history.  

 

Kidneys: No masses are evident. No hydronephrosis is present.   No cysts are present.  Delayed images
 were obtained through the kidneys, which remain unremarkable.

 

Aorta: Normal

 

Inferior vena cava: Normal.

 

CT PELVIS: Fecal debris is within the distal colon.   The study is without oral contrast limiting bow
el evaluation. There are a few mildly prominent fluid-filled small bowel loops in the right mid abdom
en correlate for ileus

 

Appendix: Not identified. No dilated tubular structure or inflammatory change is evident.

 

Urinary bladder: Normal. 

 

Genitourinary structures: There is mild prominence of the prostate.

 

Osseous structures: No suspicious lytic or sclerotic lesions.

 

IMPRESSION:

1.  Stable appearing moderate size hiatal hernia with postsurgical changes within the stomach. Findin
gs appears stable.

2. Mild prominence of right mid small bowel loops containing fluid. Correlate for mild ileus. No obst
ruction is identified.

## 2024-04-10 NOTE — ED
Abdominal Pain HPI





- General


Chief Complaint: Abdominal Pain


Stated Complaint: abd pain


Time Seen by Provider: 04/10/24 10:10


Source: patient, RN notes reviewed


Mode of arrival: ambulatory


Limitations: no limitations





- History of Present Illness


Initial Comments: 


This is a 43-year-old male with history of gastric bypass surgery and large 

hiatal hernia who presents to the ED with chief complaint of worsening abdominal

pain, chest pressure, and nausea vomiting.  Patient states that this morning he 

was attempting to eat some breakfast when he began to feel nauseous and felt a 

pop in his epigastric region assoicated with abdominal pain.  Patient states 

that he has been having difficulty consuming food and liquids over the past few 

months due to pain from his hiatal hernia for years and was supposed to be 

contacted this week for potential PEG tube placement.  Patient denies fevers at 

home, diarrhea, hematemesis, hematochezia.  Endorses vomiting with an episode 

this morning described as green in color.  He does not use any antacids or PPIs 

at home, is prescribed Norco for pain relief. Patient follow with CT surgery at 

, Dr. Callaway. 








- Related Data


                                Home Medications











 Medication  Instructions  Recorded  Confirmed


 


traZODone  mg PO HS 08/04/22 04/10/24


 


Zolpidem [Ambien] 10 mg PO HS PRN 04/10/24 04/10/24


 


diphenhydrAMINE [Benadryl] 25 mg PO HS 04/10/24 04/10/24











                                    Allergies











Allergy/AdvReac Type Severity Reaction Status Date / Time


 


latex Allergy  Rash/Hives Verified 04/10/24 12:48


 


meperidine [From Demerol] Allergy  Rash/Hives Verified 04/10/24 12:48














Review of Systems


ROS Statement: 


Those systems with pertinent positive or pertinent negative responses have been 

documented in the HPI.





ROS Other: All systems not noted in ROS Statement are negative.





Past Medical History


Past Medical History: Atrial Fibrillation, GERD/Reflux, Hypertension


Additional Past Medical History / Comment(s): Pt states difficulty with 

abdominal pain/nausea and vomiting when eating since surgery for hiatal 

hernia/peg tube insertion.States afib during his  hospitalization for carola en 

Y/peg tube surgery, R shoulder dislocations, pt states recent EKG at Dr. Marvin's

office showed MI at some time, chronic low back pain. Removal of Peg Tube. 

States having dizziness. See Dr. Raygoza's H&P.  infection in stomach


History of Any Multi-Drug Resistant Organisms: None Reported


Past Surgical History: Appendectomy, Back Surgery, Cholecystectomy, Hernia 

Repair, Orthopedic Surgery, Tonsillectomy


Additional Past Surgical History / Comment(s): Lysis of abdominal adhesions then

Carola en Y, peg tube insertion, EGD, diaphragmatic hernia repair, lumbar fusion, 

R shoulder arthroscopy, L shoulder fusion. States his hernia was attached to his

stomach wall and this is  why all the surgery. See Dr. Raygoza's H&P.


Past Anesthesia/Blood Transfusion Reactions: No Reported Reaction


Past Psychological History: No Psychological Hx Reported


Smoking Status: Never smoker


Past Alcohol Use History: None Reported


Past Drug Use History: None Reported





- Past Family History


  ** Mother


Additional Family Medical History / Comment(s): Mother is , she had 

lupus.





  ** Father


History Unknown: Yes





  ** Brother(s)


Additional Family Medical History / Comment(s): autoimmune disorder





General Exam


Limitations: no limitations


General appearance: alert, in no apparent distress


Head exam: Present: atraumatic, normocephalic, normal inspection


Eye exam: Present: normal appearance, PERRL, EOMI.  Absent: scleral icterus, 

conjunctival injection, periorbital swelling


ENT exam: Present: normal exam, mucous membranes moist


Neck exam: Present: normal inspection.  Absent: tenderness, meningismus, 

lymphadenopathy


Respiratory exam: Present: normal lung sounds bilaterally.  Absent: respiratory 

distress, wheezes, rales, rhonchi, stridor


Cardiovascular Exam: Present: regular rate, normal rhythm, normal heart sounds. 

Absent: systolic murmur, diastolic murmur, rubs, gallop, clicks


GI/Abdominal exam: Present: soft, tenderness (epigastric, RUQ), normal bowel 

sounds, other (8 cm previous abdominal surgical scar, midline abdomen).  Absent:

distended, guarding, rebound, rigid


Extremities exam: Present: normal inspection, full ROM, normal capillary refill.

 Absent: tenderness, pedal edema, joint swelling, calf tenderness


Back exam: Present: normal inspection


Neurological exam: Present: alert, oriented X3, CN II-XII intact


Psychiatric exam: Present: normal affect, normal mood


Skin exam: Present: warm, dry, intact, normal color.  Absent: rash





Course


                                   Vital Signs











  04/10/24 04/10/24 04/10/24





  10:05 12:06 13:34


 


Temperature 97.7 F  


 


Pulse Rate 83 69 70


 


Respiratory 18 18 16





Rate   


 


Blood Pressure 108/59 107/77 109/76


 


O2 Sat by Pulse 100 100 97





Oximetry   














Medical Decision Making





- Medical Decision Making


Was pt. sent in by a medical professional or institution (, PA, NP, urgent 

care, hospital, or nursing home...) When possible be specific


@ -No


Did you speak to anyone other than the patient for history (EMS, parent, family,

police, friend...)? What history was obtained from this source 


@ -No


Did you review nursing and triage notes (agree or disagree)? Why? 


@ -Patient has a known large hiatal hernia and follows with  thoracic surgery 

for this.  Patient was not admitted at previous emergency department visit with 

abdominal pain, nausea, vomiting.


Were old charts reviewed (outside hosp., previous admission, EMS record, old 

EKG, old radiological studies, urgent care reports/EKG's, nursing home records)?

Report findings 


@ -No old charts were reviewed


Differential Diagnosis (chest pain, altered mental status, abdominal pain women,

abdominal pain men, vaginal bleeding, weakness, fever, dyspnea, syncope, 

headache, dizziness, GI bleed, back pain, seizure, CVA, palpatations, mental 

health, musculoskeletal)? 


@ -Differential Abdominal Pain Men:


Appendicitis, cholecystitis, diverticulosis, ischemic bowel, pancreatitis, 

hepatitis, UTI, gastroenteritis, AAA, incarcerated hernia, bowel obstruction, 

constipation, inflammatory bowel, hepatitis, peptic ulcer disease, splenic 

infarction, perforated viscus, testicular torsion, this is not meant to be an 

all-inclusive list





EKG interpreted by me (3pts min.).


@ -Completed at 1253, sinus rhythm, ventricular rate 64, NM interval 195, QTc 

395.  No acute signs of ischemia.


X-rays interpreted by me (1pt min.).


@ -KUB x-ray reveals a nonspecific abdomen with no evidence of obstruction and  

postsurgical changes with suspected hiatal hernia.


CT interpreted by me (1pt min.).


@ -CT with IV contrast of abdomen pelvis reveals a stable hiatal hernia with 

postsurgical changes within abdomen and noted prominence of the right mid small 

bowel loops containing fluid correlate for mild ileus with no obstruction 

identified


U/S interpreted by me (1pt. min.).


@ -None done


What testing was considered but not performed or refused? (CT, X-rays, U/S, 

labs)? Why?


@ -None


What meds were considered but not given or refused? Why?


@ -None


Did you discuss the management of the patient with other professionals 

(professionals i.e. , PA, NP, lab, RT, psych nurse, , , 

teacher, , )? Give summary


@ -I discussed the management with Dr. Pruitt who suggested I contact Trauma 

surgery, Dr. Miller for evaluation.  Dr. Mills recommends transfer to [facility] for continuation of care due to patient CT abdomen results and additional 

symptoms.  I spoke with Dr. Villa, ED admission physician to states that 

patient is not a candidate for transfer due to not having electrolyte 

abnormalities at this time and pain stable aside from reports of pain.  Recom

mend that patient follows up outpatient with CT surgeon, Dr. Callaway, for 

further evaluation.


Was smoking cessation discussed for >3mins.?


@ -No


Was critical care preformed (if so, how long)?


@ -No


Were there social determinants of health that impacted care today? How? 

(Homelessness, low income, unemployed, alcoholism, drug addiction, 

transportation, low edu. Level, literacy, decrease access to med. care, half-way, 

rehab)?


@ -No


Was there de-escalation of care discussed even if they declined (Discuss DNR or 

withdrawal of care, Hospice)? DNR status


@ -No


What co-morbidities impacted this encounter? (DM, HTN, Smoking, COPD, CAD, 

Cancer, CVA, ARF, Chemo, Hep., AIDS, mental health diagnosis, sleep apnea, 

morbid obesity)?


@ -None


Was patient admitted / discharged? Hospital course, mention meds given and 

route, prescriptions, significant lab abnormalities, going to OR and other 

pertinent info.


@ -43-year-old male with gastric pain, nausea and vomiting.  On physical exam 

patient was noted to have tenderness to palpation over the epigastric and right 

upper quadrant.  Patient was given IV antibiotics in addition to remaining 

medication and sent for x-ray KUB to rule out acute peritoneum, evaluation no 

signs of urinary abdomen on x-ray.  Patient laboratory results sent in addition 

to coagulation profile and pancreatic enzymes.  Patient denies any urinary 

symptoms therefore UA was deferred at this time. Laboratory results unremarkable

on CBC for acute signs of infection, no electrolyte abnormalities noted at this 

time, lactate elevated at 2.5..  On reevaluation, patient states that he is 

still experiencing epigastric and chest discomfort that was reproducible on 

palpation.  Patient given antacids in addition to oral GI cocktail, EKG ordered.

 No acute signs of ischemia, patient given second dose symptomatic patient CT 

results concerning for mild ileus with no signs of obstruction, Dr. Mills 

consulted for potential transfer to [facility]  With transfer to admitting doctor.


You have orders transfer at this time and recommends follow-up outpatient with 

surgeon.  I discussed this with the patient, who states that she will get a hold

of the office today or tomorrow for outpatient follow-up.  Discussed strict 

return parameters with patient, and he is agreeable with this patient also 

states that he will likely self transport himself to [facility] for further evaluation.

 Additionally, patient states that he has been experiencing struggles with 

picking up his prescriptions due to not being covered with additional 

prescription coverage on Medicaid.  Patient given pain medication starter pack 

in the ED. Discussed with Dr. Pruitt.


Undiagnosed new problem with uncertain prognosis?


@ -No


Drug Therapy requiring intensive monitoring for toxicity (Heparin, Nitro, 

Insulin, Cardizem)?


@ -No


Were any procedures done?


@ -No


Diagnosis/symptom?


@ -Hiatal hernia, ileus, abdominal pain


Acute, or Chronic, or Acute on Chronic?


@ -acute


Uncomplicated (without systemic symptoms) or Complicated (systemic symptoms)?


@ -uncomplicated


Side effects of treatment?


@ -No


Exacerbation, Progression, or Severe Exacerbation?


@ -No


Poses a threat to life or bodily function? How? (Chest pain, USA, MI, pneumonia,

PE, COPD, DKA, ARF, appy, cholecystitis, CVA, Diverticulitis, Homicidal, 

Suicidal, threat to staff... and all critical care pts)


@ -No








- Lab Data


Result diagrams: 


                                 04/10/24 10:45





                                 04/10/24 10:45


                                   Lab Results











  04/10/24 04/10/24 04/10/24 Range/Units





  10:45 10:45 10:45 


 


WBC  3.7 L    (3.8-10.6)  k/uL


 


RBC  4.61    (4.30-5.90)  m/uL


 


Hgb  14.5    (13.0-17.5)  gm/dL


 


Hct  44.8    (39.0-53.0)  %


 


MCV  97.1    (80.0-100.0)  fL


 


MCH  31.4    (25.0-35.0)  pg


 


MCHC  32.4    (31.0-37.0)  g/dL


 


RDW  13.6    (11.5-15.5)  %


 


Plt Count  219    (150-450)  k/uL


 


MPV  7.9    


 


Neutrophils %  58    %


 


Lymphocytes %  31    %


 


Monocytes %  6    %


 


Eosinophils %  2    %


 


Basophils %  1    %


 


Neutrophils #  2.1    (1.3-7.7)  k/uL


 


Lymphocytes #  1.2    (1.0-4.8)  k/uL


 


Monocytes #  0.2    (0-1.0)  k/uL


 


Eosinophils #  0.1    (0-0.7)  k/uL


 


Basophils #  0.0    (0-0.2)  k/uL


 


PT   10.3   (10.0-12.5)  sec


 


INR   0.9   (<1.2)  


 


APTT   20.6 L   (22.0-30.0)  sec


 


Sodium    142  (137-145)  mmol/L


 


Potassium    4.6  (3.5-5.1)  mmol/L


 


Chloride    107  ()  mmol/L


 


Carbon Dioxide    21 L  (22-30)  mmol/L


 


Anion Gap    14  mmol/L


 


BUN    15  (9-20)  mg/dL


 


Creatinine    0.68  (0.66-1.25)  mg/dL


 


Est GFR (CKD-EPI)AfAm    >90  (>60 ml/min/1.73 sqM)  


 


Est GFR (CKD-EPI)NonAf    >90  (>60 ml/min/1.73 sqM)  


 


Glucose    110 H  (74-99)  mg/dL


 


Lactic Ac Sepsis Rflx     


 


Plasma Lactic Acid Frankie     (0.7-2.0)  mmol/L


 


Calcium    9.7  (8.4-10.2)  mg/dL


 


Total Bilirubin    0.5  (0.2-1.3)  mg/dL


 


AST    32  (17-59)  U/L


 


ALT    26  (4-49)  U/L


 


Alkaline Phosphatase    79  ()  U/L


 


Total Protein    7.1  (6.3-8.2)  g/dL


 


Albumin    4.7  (3.5-5.0)  g/dL


 


Amylase    75  ()  U/L


 


Lipase    181  ()  U/L














  04/10/24 04/10/24 Range/Units





  10:45 11:30 


 


WBC    (3.8-10.6)  k/uL


 


RBC    (4.30-5.90)  m/uL


 


Hgb    (13.0-17.5)  gm/dL


 


Hct    (39.0-53.0)  %


 


MCV    (80.0-100.0)  fL


 


MCH    (25.0-35.0)  pg


 


MCHC    (31.0-37.0)  g/dL


 


RDW    (11.5-15.5)  %


 


Plt Count    (150-450)  k/uL


 


MPV    


 


Neutrophils %    %


 


Lymphocytes %    %


 


Monocytes %    %


 


Eosinophils %    %


 


Basophils %    %


 


Neutrophils #    (1.3-7.7)  k/uL


 


Lymphocytes #    (1.0-4.8)  k/uL


 


Monocytes #    (0-1.0)  k/uL


 


Eosinophils #    (0-0.7)  k/uL


 


Basophils #    (0-0.2)  k/uL


 


PT    (10.0-12.5)  sec


 


INR    (<1.2)  


 


APTT    (22.0-30.0)  sec


 


Sodium    (137-145)  mmol/L


 


Potassium    (3.5-5.1)  mmol/L


 


Chloride    ()  mmol/L


 


Carbon Dioxide    (22-30)  mmol/L


 


Anion Gap    mmol/L


 


BUN    (9-20)  mg/dL


 


Creatinine    (0.66-1.25)  mg/dL


 


Est GFR (CKD-EPI)AfAm    (>60 ml/min/1.73 sqM)  


 


Est GFR (CKD-EPI)NonAf    (>60 ml/min/1.73 sqM)  


 


Glucose    (74-99)  mg/dL


 


Lactic Ac Sepsis Rflx   Y  


 


Plasma Lactic Acid Frankie  2.5 H*   (0.7-2.0)  mmol/L


 


Calcium    (8.4-10.2)  mg/dL


 


Total Bilirubin    (0.2-1.3)  mg/dL


 


AST    (17-59)  U/L


 


ALT    (4-49)  U/L


 


Alkaline Phosphatase    ()  U/L


 


Total Protein    (6.3-8.2)  g/dL


 


Albumin    (3.5-5.0)  g/dL


 


Amylase    ()  U/L


 


Lipase    ()  U/L














Disposition


Clinical Impression: 


 Hiatal hernia, Ileus, unspecified, Nausea





Narrative: 


Please return to the Emergency Department if symptoms worsen or any other 

concerns.  Follow-up with your CT surgeon, Dr. Callaway, within the next 1 to 2

days for further evaluation.





Disposition: HOME SELF-CARE


Condition: Fair


Instructions (If sedation given, give patient instructions):  Hiatal Hernia (ED)


Is patient prescribed a controlled substance at d/c from ED?: No


Referrals: 


Tyra Marvin MD [Primary Care Provider] - 1-2 days


Time of Disposition: 12:36

## 2024-04-10 NOTE — XR
EXAMINATION TYPE: XR KUB

 

DATE OF EXAM: 4/10/2024

 

COMPARISON: 9/11/2022

 

HISTORY: Pain

 

TECHNIQUE: One view abdominal series

 

FINDINGS:  

Postsurgical changes lower lumbar spine. There is a hiatal hernia with postsurgical changes in the ep
igastrium. No diagnostic evidence of bowel obstruction. Correlate for constipation with moderate darío
ined debris.

 

No suspicious calcifications. Diffuse osteopenia. Mild multilevel degenerative disc disease.

 

IMPRESSION:

1. Nonspecific abdomen with no diagnostic evidence of obstruction. Correlate for constipation.

2. There are postsurgical changes in the epigastrium with a suspected hiatal hernia.

## 2024-05-22 ENCOUNTER — HOSPITAL ENCOUNTER (EMERGENCY)
Dept: HOSPITAL 47 - EC | Age: 44
Discharge: HOME | End: 2024-05-22
Payer: COMMERCIAL

## 2024-05-22 VITALS
DIASTOLIC BLOOD PRESSURE: 93 MMHG | TEMPERATURE: 98.5 F | HEART RATE: 71 BPM | RESPIRATION RATE: 18 BRPM | SYSTOLIC BLOOD PRESSURE: 128 MMHG

## 2024-05-22 DIAGNOSIS — Z88.8: ICD-10-CM

## 2024-05-22 DIAGNOSIS — M24.411: Primary | ICD-10-CM

## 2024-05-22 DIAGNOSIS — Z91.040: ICD-10-CM

## 2024-05-22 DIAGNOSIS — K44.9: ICD-10-CM

## 2024-05-22 LAB
ALBUMIN SERPL-MCNC: 4 G/DL (ref 3.5–5)
ALP SERPL-CCNC: 88 U/L (ref 38–126)
ALT SERPL-CCNC: 77 U/L (ref 4–49)
ANION GAP SERPL CALC-SCNC: 5 MMOL/L
AST SERPL-CCNC: 179 U/L (ref 17–59)
BASOPHILS # BLD AUTO: 0 K/UL (ref 0–0.2)
BASOPHILS NFR BLD AUTO: 1 %
BUN SERPL-SCNC: 20 MG/DL (ref 9–20)
CALCIUM SPEC-MCNC: 8.8 MG/DL (ref 8.4–10.2)
CHLORIDE SERPL-SCNC: 107 MMOL/L (ref 98–107)
CO2 SERPL-SCNC: 26 MMOL/L (ref 22–30)
EOSINOPHIL # BLD AUTO: 0.1 K/UL (ref 0–0.7)
EOSINOPHIL NFR BLD AUTO: 2 %
ERYTHROCYTE [DISTWIDTH] IN BLOOD BY AUTOMATED COUNT: 4.76 M/UL (ref 4.3–5.9)
ERYTHROCYTE [DISTWIDTH] IN BLOOD: 13.1 % (ref 11.5–15.5)
GLUCOSE SERPL-MCNC: 92 MG/DL (ref 74–99)
HCT VFR BLD AUTO: 45 % (ref 39–53)
HGB BLD-MCNC: 14.8 GM/DL (ref 13–17.5)
LYMPHOCYTES # SPEC AUTO: 2 K/UL (ref 1–4.8)
LYMPHOCYTES NFR SPEC AUTO: 34 %
MCH RBC QN AUTO: 31.1 PG (ref 25–35)
MCHC RBC AUTO-ENTMCNC: 32.9 G/DL (ref 31–37)
MCV RBC AUTO: 94.6 FL (ref 80–100)
MONOCYTES # BLD AUTO: 0.4 K/UL (ref 0–1)
MONOCYTES NFR BLD AUTO: 7 %
NEUTROPHILS # BLD AUTO: 3.1 K/UL (ref 1.3–7.7)
NEUTROPHILS NFR BLD AUTO: 55 %
PLATELET # BLD AUTO: 215 K/UL (ref 150–450)
POTASSIUM SERPL-SCNC: 4.5 MMOL/L (ref 3.5–5.1)
PROT SERPL-MCNC: 6.7 G/DL (ref 6.3–8.2)
SODIUM SERPL-SCNC: 138 MMOL/L (ref 137–145)
WBC # BLD AUTO: 5.7 K/UL (ref 3.8–10.6)

## 2024-05-22 PROCEDURE — 23650 CLTX SHO DSLC W/MNPJ WO ANES: CPT

## 2024-05-22 PROCEDURE — 99152 MOD SED SAME PHYS/QHP 5/>YRS: CPT

## 2024-05-22 PROCEDURE — 74177 CT ABD & PELVIS W/CONTRAST: CPT

## 2024-05-22 PROCEDURE — 80053 COMPREHEN METABOLIC PANEL: CPT

## 2024-05-22 PROCEDURE — 96374 THER/PROPH/DIAG INJ IV PUSH: CPT

## 2024-05-22 PROCEDURE — 99284 EMERGENCY DEPT VISIT MOD MDM: CPT

## 2024-05-22 PROCEDURE — 73020 X-RAY EXAM OF SHOULDER: CPT

## 2024-05-22 PROCEDURE — 85025 COMPLETE CBC W/AUTO DIFF WBC: CPT

## 2024-05-22 PROCEDURE — 73030 X-RAY EXAM OF SHOULDER: CPT

## 2024-05-22 PROCEDURE — 96375 TX/PRO/DX INJ NEW DRUG ADDON: CPT

## 2024-05-22 PROCEDURE — 96361 HYDRATE IV INFUSION ADD-ON: CPT

## 2024-05-22 PROCEDURE — 36415 COLL VENOUS BLD VENIPUNCTURE: CPT

## 2024-05-22 RX ADMIN — KETAMINE HYDROCHLORIDE ONE MG: 10 INJECTION, SOLUTION INTRAMUSCULAR; INTRAVENOUS at 04:24

## 2024-05-22 RX ADMIN — PROPOFOL ONE MG: 10 INJECTION, EMULSION INTRAVENOUS at 04:06

## 2024-05-22 RX ADMIN — PROPOFOL ONE MG: 10 INJECTION, EMULSION INTRAVENOUS at 04:27

## 2024-05-22 RX ADMIN — ACETAMINOPHEN AND CODEINE PHOSPHATE STA EACH: 300; 30 TABLET ORAL at 05:26

## 2024-05-22 RX ADMIN — HYDROMORPHONE HYDROCHLORIDE STA MG: 1 INJECTION, SOLUTION INTRAMUSCULAR; INTRAVENOUS; SUBCUTANEOUS at 03:26

## 2024-05-22 RX ADMIN — FAMOTIDINE STA MG: 10 INJECTION, SOLUTION INTRAVENOUS at 09:12

## 2024-05-22 RX ADMIN — ONDANSETRON STA MG: 2 INJECTION INTRAMUSCULAR; INTRAVENOUS at 04:06

## 2024-05-22 RX ADMIN — CEFAZOLIN STA MLS/HR: 330 INJECTION, POWDER, FOR SOLUTION INTRAMUSCULAR; INTRAVENOUS at 03:28

## 2024-05-22 RX ADMIN — PANTOPRAZOLE SODIUM STA MG: 40 INJECTION, POWDER, FOR SOLUTION INTRAVENOUS at 04:06

## 2024-05-22 RX ADMIN — MORPHINE SULFATE STA MG: 4 INJECTION, SOLUTION INTRAMUSCULAR; INTRAVENOUS at 05:26

## 2024-05-22 NOTE — XR
EXAMINATION TYPE: XR shoulder limited RT

 

DATE OF EXAM: 5/22/2024

 

CLINICAL HISTORY: Postreduction

 

TECHNIQUE: 2 views of the right shoulder are obtained.

 

COMPARISON: Prior right shoulder x-rays May 22, 2024 

 

FINDINGS: Surgical change to superior aspect of the humeral head and osseous glenoid are redemonstrat
ed. No acute fracture or dislocation is seen. Acromioclavicular joint is maintained. Visualized ribs 
are intact. No significant change from most recent prior.

 

IMPRESSION: As above.

## 2024-05-22 NOTE — XR
EXAM:

  XR Right Shoulder Complete, 2 or More Views

 

CLINICAL HISTORY:

  ITS.REASON XR Reason: r/o disolocation

 

TECHNIQUE:

  Two or more views of the right shoulder.

 

COMPARISON:

  

1/30/2022

 

FINDINGS:

  Bones/joints:  No acute fracture.  No dislocation.  Hardware intact.

  Soft tissues:  Unremarkable.

 

IMPRESSION:     

  No acute osseous abnormalities.

## 2024-05-22 NOTE — CT
EXAMINATION TYPE: CT abdomen pelvis w con

CT DLP: 859.6 mGycm, Automated exposure control for dose reduction was used.

 

DATE OF EXAM: 5/22/2024 7:53 AM

 

COMPARISON: 4/10/2024 

 

CLINICAL INDICATION:Male, 43 years old with history of abd pain, numerous prior surgeries; Abdominal 
pain, numerous prior surgeries

 

TECHNIQUE:  Axial CT abdomen pelvis w con;Sagittal and coronal reformats were created on a separate w
orkstation. 

 

Contrast used:100 ml mL of Isovue 300 with IV Contrast, (none if empty)

Oral contrast used: without Oral Contrast (none if empty)

 

FINDINGS: 

LOWER CHEST: Unremarkable

 

ABDOMEN

LIVER: Unremarkable

GALLBLADDER AND BILE DUCTS: Unremarkable.

PANCREAS: Unremarkable.

SPLEEN: Unremarkable.

ADRENAL GLANDS: Unremarkable.

KIDNEYS AND URETERS: No evidence of hydronephrosis or renal calculus. The ureters are unremarkable.  


 

PELVIS

BLADDER: Unremarkable

REPRODUCTIVE: Unremarkable.

 

ABDOMEN & PELVIS

STOMACH AND BOWEL: No evidence of bowel obstruction. Moderate hiatal hernia is present with its posts
urgical sutures are identified. There is multiple is a bowel containing suture.

PERITONEUM/RETROPERITONEUM: No evidence of pneumoperitoneum or free fluid.

VASCULATURE: No evidence of aortic aneurysm. 

MUSCULOSKELETAL: No acute osseous abnormalities, postsurgical changes to the spine. Hardware L5 and S
1 appears intact.

LYMPH NODES: No gross evidence for lymphadenopathy.

SOFT TISSUE/ABDOMINAL WALL: Unremarkable

 

IMPRESSION:

There is some mild fat stranding wall thickening of the stomach in the thorax possibly representing g
astritis. Otherwise no significant change from priors with moderate hiatal hernia postsurgical change
s of bowel.

## 2024-05-22 NOTE — ED
General Adult HPI





- General


Source: patient, RN notes reviewed, old records reviewed


Mode of arrival: ambulatory


Limitations: no limitations





<Aidan Vick - Last Filed: 24 06:28>





<Kang Kessler - Last Filed: 24 08:54>





- General


Chief complaint: Extremity Injury, Upper


Stated complaint: R Shoulder Dislocation


Time Seen by Provider: 24 02:43





- History of Present Illness


Initial comments: 





Patient is a 43-year-old male who presents emergency department complaining of 

right shoulder pain.  Patient states he believes he dislocated it.  Was helping 

his family member move objects at 2 AM.  States that felt like it was dislocated

and tried to put on his shirt afterwards.  Denies any obvious trauma otherwise. 

Presents for evaluation.Also complaining of chronic nausea, vomiting, epigastric

abdominal discomfort.  Follows up with his surgeon at [facility].  Recent scope a 

few months ago that was inconclusive.  Primary complaint is the right shoulder. 

(Aidan Vick)





- Related Data


                                Home Medications











 Medication  Instructions  Recorded  Confirmed


 


traZODone  mg PO HS 08/04/22 04/10/24


 


Zolpidem [Ambien] 10 mg PO HS PRN 04/10/24 04/10/24


 


diphenhydrAMINE [Benadryl] 25 mg PO HS 04/10/24 04/10/24








                                  Previous Rx's











 Medication  Instructions  Recorded


 


Pantoprazole [Protonix] 40 mg PO DAILY #30 tab 24











                                    Allergies











Allergy/AdvReac Type Severity Reaction Status Date / Time


 


latex Allergy  Rash/Hives Verified 24 02:38


 


meperidine [From Demerol] Allergy  Rash/Hives Verified 24 02:38














Review of Systems


ROS Other: All systems not noted in ROS Statement are negative.





<Aidan Vick - Last Filed: 24 06:28>


ROS Other: All systems not noted in ROS Statement are negative.





<Kang Kessler - Last Filed: 24 08:54>


ROS Statement: 


Those systems with pertinent positive or pertinent negative responses have been 

documented in the HPI.


Review of Systems:


CONST: Denies fever


EYES: Denies blurry vision


ENT: Denies nasal congestion


C/V: Denies Chest pain


RESP: Denies shortness of breath


GI: Denies abdominal pain


: Denies dysuria


SKIN: Denies rash.


MSK: Endorses right shoulder pain


NEURO: Denies headache


 (Aidan Vick)





Past Medical History


Past Medical History: Atrial Fibrillation, GERD/Reflux, Hypertension


Additional Past Medical History / Comment(s): Pt states difficulty with 

abdominal pain/nausea and vomiting when eating since surgery for hiatal 

hernia/peg tube insertion.States afib during his  hospitalization for al en 

Y/peg tube surgery, R shoulder dislocations, pt states recent EKG at Dr. Marvin's

office showed MI at some time, chronic low back pain. Removal of Peg Tube. 

States having dizziness. See Dr. Raygoza's H&P.  infection in stomach


History of Any Multi-Drug Resistant Organisms: None Reported


Past Surgical History: Appendectomy, Back Surgery, Cholecystectomy, Hernia 

Repair, Orthopedic Surgery, Tonsillectomy


Additional Past Surgical History / Comment(s): Lysis of abdominal adhesions then

Al en Y, peg tube insertion and removal, EGD, diaphragmatic hernia repair, 

lumbar fusion, R shoulder arthroscopy, L shoulder fusion. States his hernia was 

attached to his stomach wall and this is  why all the surgery. See Dr. Raygoza's

H&P.


Past Anesthesia/Blood Transfusion Reactions: No Reported Reaction


Past Psychological History: No Psychological Hx Reported


Smoking Status: Never smoker


Past Alcohol Use History: None Reported


Past Drug Use History: None Reported





- Past Family History


  ** Mother


Additional Family Medical History / Comment(s): Mother is , she had 

lupus.





  ** Father


History Unknown: Yes





  ** Brother(s)


Additional Family Medical History / Comment(s): autoimmune disorder





<Aidan Vick - Last Filed: 24 06:28>





General Exam


Limitations: no limitations





<Aidan Vick - Last Filed: 24 06:28>





- General Exam Comments


Initial Comments: 





General: Appears in no acute distress.


HEAD: Normal with no signs of head trauma.


EYES: EOMI


ENT: Hearing grossly intact, normal oropharynx.


RESPIRATORY: Clear breath sounds bilaterally.  No wheezes, rales, or rhonchi.


C/V: Peripheral pulses 2+ intact throughout


ABD: Abdomen is nontender, soft, nondistended.  No guarding.  No rebound 

tenderness.


EXT: Right shoulder appears dislocated posteriorly.  No obvious other deformity 

appreciated.  Neurovascular intact in the right upper extremity.


SKIN: No rashes or lesions observed on exposed skin.


NEURO: Alert and oriented x 4.


 (Aidan Vick)





Course





                                   Vital Signs











  24





  02:39 04:00 04:15


 


Temperature 98.3 F  


 


Pulse Rate 72 86 81


 


Respiratory 18 18 18





Rate   


 


Blood Pressure 118/79 126/80 130/90


 


O2 Sat by Pulse 99 97 98





Oximetry   














  24





  04:19 04:24 04:29


 


Temperature   


 


Pulse Rate 81 72 68


 


Respiratory 18 16 16





Rate   


 


Blood Pressure 131/90 118/79 111/71


 


O2 Sat by Pulse 98 94 L 94 L





Oximetry   














  24





  04:34 04:39 04:54


 


Temperature   


 


Pulse Rate 69 81 77


 


Respiratory 16 18 18





Rate   


 


Blood Pressure 127/71 111/75 103/74


 


O2 Sat by Pulse 94 L 100 98





Oximetry   














  24





  05:09 05:24 05:39


 


Temperature   


 


Pulse Rate 87 82 88


 


Respiratory 18 18 18





Rate   


 


Blood Pressure 113/84 105/73 112/78


 


O2 Sat by Pulse 97 98 99





Oximetry   














  24





  06:09 06:39 08:07


 


Temperature   


 


Pulse Rate 69 69 78


 


Respiratory 18 18 20





Rate   


 


Blood Pressure 111/62 115/62 116/80


 


O2 Sat by Pulse 98 98 99





Oximetry   














Procedures





- Orthopedic Joint Reduction


  ** Joint #1


Consent Obtained: verbal consent


Side: right


Joint Reduction Location: shoulder


Analgesia: procedural sedation


Shoulder Technique Used (if applicable): traction/counter-traction


Post-Reduction Neuro Exam: intact


Post-Reduction Vascular Exam: intact


Post Reduction X-Ray Obtained: Yes


Post Reduction X-Ray Results: reduced


Patient Tolerated Procedure: well





- Procedural Sedation


*Procedural Sedation Start Time: 04:19


*Procedural Sedation Stop Time: 04:35


*Risks,benefits, and alternative therapies discussed?: Yes


*Patient indicates understanding of risk/benefit discussion?: Yes


*Indications: fracture/dislocation reduction


*Previous Adverse Reaction to Anesthesia/Sedation?: No


*ASA Class: I


*Mallampati Airway Score: 2


*Time of Last PO Intake: 18:00


Preparation: cardiac monitor applied, pulse oximeter, capnometry used, 

supplemental O2 applied, suction/airway equipment at bedside, IV secured


Ketamine: IV


Ketamine Dose: 33


IV Propofol Dose (mgs): 52


Complications: none


Patient Tolerated Procedure: well





<Aidan Vick - Last Filed: 24 06:28>





- Orthopedic Joint Reduction


  ** Joint #1


Additional Comments: 





placed in shoulder immobilizer    (Aidan Vick)





Medical Decision Making





- Lab Data


Result diagrams: 


                                 24 04:04





                                 24 04:04





<Aidan Vick - Last Filed: 24 06:28>





- Lab Data


Result diagrams: 


                                 24 04:04





                                 24 04:04





<ChecoKang - Last Filed: 24 08:54>





- Medical Decision Making





Was pt. sent in by a medical professional or institution (, PA, NP, urgent 

care, hospital, or nursing home...) When possible be specific


@  -No


Did you speak to anyone other than the patient for history (EMS, parent, family,

police, friend...)? What history was obtained from this source 


@  -No


Did you review nursing and triage notes (agree or disagree)?  Why? 


@  -I reviewed and agree with nursing and triage notes


Were old charts reviewed (outside hosp., previous admission, EMS record, old 

EKG, old radiological studies, urgent care reports/EKG's, nursing home records)?

Report findings 


@  -No old charts were reviewed


Differential Diagnosis (chest pain, altered mental status, abdominal pain women,

abdominal pain men, vaginal bleeding, weakness, fever, dyspnea, syncope, 

headache, dizziness, GI bleed, back pain, seizure, CVA, palpatations, mental hea

lth, musculoskeletal)? 


@  -Differential Musculoskeletal


Muscular strain, contusion, ligament sprain, fracture, arthritis, septic 

arthritis, bursitis, cellulitis, muscle spasm, nerve compression, DVT, arterial 

occlusion, herpes zoster, electrolyte abnormality, tumor.... This is not meant 

to be in all inclusive list


EKG interpreted by me (3pts min.).


@  -None done


X-rays interpreted by me (1pt min.).


@  -X-ray read by radiology as showing no evidence of shoulder dislocation 

however this is similar to previous presentation when clinically the patient 

presents as a posterior shoulder dislocation and has history for shoulder 

replacement complicates the x-rays.


CT interpreted by me (1pt min.).


@  -None done


U/S interpreted by me (1pt. min.).


@  -None done


What testing was considered but not performed or refused? (CT, X-rays, U/S, 

labs)? Why?


@  -None


What meds were considered but not given or refused? Why?


@  -None


Did you discuss the management of the patient with other professionals (prof feldman i.e. , PA, NP, lab, RT, psych nurse, , , 

teacher, , )? Give summary


@  -No


Was smoking cessation discussed for >3mins.?


@  -No


Was critical care preformed (if so, how long)?


@  -No


Were there social determinants of health that impacted care today? How? 

(Homelessness, low income, unemployed, alcoholism, drug addiction, 

transportation, low edu. Level, literacy, decrease access to med. care, prison, 

rehab)?


@  -No


Was there de-escalation of care discussed even if they declined (Discuss DNR or 

withdrawal of care, Hospice)? DNR status


@  -No


What co-morbidities impacted this encounter? (DM, HTN, Smoking, COPD, CAD, 

Cancer, CVA, ARF, Chemo, Hep., AIDS, mental health diagnosis, sleep apnea, 

morbid obesity)?


@  -None


Was patient admitted / discharged? Hospital course, mention meds given and 

route, prescriptions, significant lab abnormalities, going to OR and other 

pertinent info.


@  -Patient presents with right shoulder pain.  Has a history of right shoulder 

surgery.  Concerned he may have dislocated it.  Will obtain x-ray.  IV access w

ill be obtained patient will be given Dilaudid and IV fluids.  Patient was in 

agreement this plan.





Is also complaining of nausea, vomiting, decreased oral intake as the patient 

does have a history of a hiatal hernia which is causing him issues.  States he 

is not on medications for it.  We will obtain basic labs.





At this time I discussed the results with the patient of the x-ray.  As he is 

clinically presenting as a shoulder dislocation despite the x-ray being read as 

different, and with my comparison of prior x-ray of that shoulder, we will 

proceed with procedural sedation and shoulder reduction.  Patient was in 

agreement this plan.  States this is what normally happens when he has this po

sterior shoulder dislocation concerning it looks abnormal due to the type of 

shoulder surgery he had.





Procedural sedation with ketamine and propofol completed.  See additional note. 

Patient tolerated reduction well.  Placed in a older immobilizer.  Clinically, 

shoulder appears to have been reduced.  X-ray supports this as well as the 

anatomic position appears normal.





I updated the patient.  Laboratory studies unremarkable.  Overall patient is 

feeling improved with some mild pain now in the shoulder but he states it feels 

like it is back into place.  In agreement.  I believe it is safer to be 

discharged home at this time.  Surgeon that performed the surgery on her 

shoulder was out of [place] 17 years ago.  I recommended follow-up with orthopedic

surgery as this seems to be a recurrent issue.  He was in agreement this plan.  

He already has GI follow-up at [facility] which I recommended he 

call.  He was in agreement this plan.





Patient was in agreement discharge but wanted to attempt to eat something prior 

to discharge.  Has a history of numerous abdominal surgeries with hiatal hernia.

 States that when he ate something, it feels like it is getting stuck.  Has been

having this issue for a few days.  Now that his shoulder is faxed he would like 

this evaluated.  Labs we discussed were unremarkable however he is asking for a 

CT of his abdomen pelvis at this time.  Due to his history of multiple GI 

surgeries in the past and also consideration for removing his entire stomach at 

one point, I do agree this is a reasonable request and we will obtain a CT 

abdomen pelvis.  Exam remains unremarkable.  No episodes of nausea or vomiting 

here in the department.  Did tolerate the small amount of oral intake that he 

had.





At this time as seen in my shift.  Patient will be signed out to Dr. Kessler 

pending results of CT.





Undiagnosed new problem with uncertain prognosis?


@  -No


Drug Therapy requiring intensive monitoring for toxicity (Heparin, Nitro, 

Insulin, Cardizem)?


@  -No


Were any procedures done?


@  -No





Diagnosis/symptom?


@  -Right shoulder dislocation status postreduction, chronic abdominal pain/ 

nausea


Acute, or Chronic, or Acute on Chronic?


@  -Acute on chronic


Uncomplicated (without systemic symptoms) or Complicated (systemic symptoms)?


@  -Uncomplicated


Side effects of treatment?


@  -None


Exacerbation, Progression, or Severe Exacerbation]


@  -No


Poses a threat to life or bodily function?


@  -Unlikely





 (Aidan Vick)


CT scan interpreted by myself shows mild gastritis.


Patient reevaluated and resting comfortably in bed.  Patient is updated on 

results and need for follow-up.  Patient states he is not currently on any 

medication for his stomach and would be interested in refilling his Protonix 

prescription.


Patient is recommended follow-up with his surgeon as well as primary care doctor

and orthopedic. (Kang Kessler)





- Lab Data





                                   Lab Results











  24 Range/Units





  04:04 04:04 


 


WBC  5.7   (3.8-10.6)  k/uL


 


RBC  4.76   (4.30-5.90)  m/uL


 


Hgb  14.8   (13.0-17.5)  gm/dL


 


Hct  45.0   (39.0-53.0)  %


 


MCV  94.6   (80.0-100.0)  fL


 


MCH  31.1   (25.0-35.0)  pg


 


MCHC  32.9   (31.0-37.0)  g/dL


 


RDW  13.1   (11.5-15.5)  %


 


Plt Count  215   (150-450)  k/uL


 


MPV  7.6   


 


Neutrophils %  55   %


 


Lymphocytes %  34   %


 


Monocytes %  7   %


 


Eosinophils %  2   %


 


Basophils %  1   %


 


Neutrophils #  3.1   (1.3-7.7)  k/uL


 


Lymphocytes #  2.0   (1.0-4.8)  k/uL


 


Monocytes #  0.4   (0-1.0)  k/uL


 


Eosinophils #  0.1   (0-0.7)  k/uL


 


Basophils #  0.0   (0-0.2)  k/uL


 


Sodium   138  (137-145)  mmol/L


 


Potassium   4.5  (3.5-5.1)  mmol/L


 


Chloride   107  ()  mmol/L


 


Carbon Dioxide   26  (22-30)  mmol/L


 


Anion Gap   5  mmol/L


 


BUN   20  (9-20)  mg/dL


 


Creatinine   0.65 L  (0.66-1.25)  mg/dL


 


Est GFR (CKD-EPI)AfAm   >90  (>60 ml/min/1.73 sqM)  


 


Est GFR (CKD-EPI)NonAf   >90  (>60 ml/min/1.73 sqM)  


 


Glucose   92  (74-99)  mg/dL


 


Calcium   8.8  (8.4-10.2)  mg/dL


 


Total Bilirubin   0.5  (0.2-1.3)  mg/dL


 


AST   179 H  (17-59)  U/L


 


ALT   77 H  (4-49)  U/L


 


Alkaline Phosphatase   88  ()  U/L


 


Total Protein   6.7  (6.3-8.2)  g/dL


 


Albumin   4.0  (3.5-5.0)  g/dL














Disposition


Is patient prescribed a controlled substance at d/c from ED?: No





<Aidan Vick - Last Filed: 24 06:28>


Is patient prescribed a controlled substance at d/c from ED?: No


Time of Disposition: 08:53





<Kang Kessler - Last Filed: 24 08:54>


Clinical Impression: 


 Recurrent dislocation, right shoulder, Chronic nausea, Chronic abdominal pain





Disposition: HOME SELF-CARE


Condition: Stable


Instructions (If sedation given, give patient instructions):  Shoulder 

Dislocation (ED), Moderate Sedation (ED), Abdominal Pain (ED)


Additional Instructions: 


Please follow-up with your primary care physician, orthopedic physician and 

surgeon for your stomach in the next couple of days for recheck.  Prescription 

for Protonix has been sent to pharmacy.  Return for increased pain, vomiting, 

not tolerating oral intake, worsening or changing symptoms or other concerns.


Prescriptions: 


Pantoprazole [Protonix] 40 mg PO DAILY #30 tab


Referrals: 


Tyra Marvin MD [Primary Care Provider] - 1-2 days


Nimesh Olivera DO [Doctor of Osteopathic Medicine] - 1-2 days

## 2024-06-12 ENCOUNTER — HOSPITAL ENCOUNTER (EMERGENCY)
Dept: HOSPITAL 47 - EC | Age: 44
Discharge: HOME | End: 2024-06-12
Payer: COMMERCIAL

## 2024-06-12 VITALS — HEART RATE: 89 BPM | TEMPERATURE: 97.6 F | SYSTOLIC BLOOD PRESSURE: 143 MMHG | DIASTOLIC BLOOD PRESSURE: 94 MMHG

## 2024-06-12 VITALS — RESPIRATION RATE: 18 BRPM

## 2024-06-12 DIAGNOSIS — S43.004A: Primary | ICD-10-CM

## 2024-06-12 DIAGNOSIS — Z91.040: ICD-10-CM

## 2024-06-12 DIAGNOSIS — Z88.5: ICD-10-CM
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
## 2024-06-12 PROCEDURE — 23650 CLTX SHO DSLC W/MNPJ WO ANES: CPT

## 2024-06-12 PROCEDURE — 96374 THER/PROPH/DIAG INJ IV PUSH: CPT

## 2024-06-12 PROCEDURE — 99284 EMERGENCY DEPT VISIT MOD MDM: CPT

## 2024-06-12 PROCEDURE — 96376 TX/PRO/DX INJ SAME DRUG ADON: CPT

## 2024-06-12 PROCEDURE — 99152 MOD SED SAME PHYS/QHP 5/>YRS: CPT

## 2024-06-12 PROCEDURE — 96375 TX/PRO/DX INJ NEW DRUG ADDON: CPT

## 2024-06-12 PROCEDURE — 96361 HYDRATE IV INFUSION ADD-ON: CPT

## 2024-06-12 PROCEDURE — 73020 X-RAY EXAM OF SHOULDER: CPT

## 2024-06-12 RX ADMIN — ACETAMINOPHEN AND CODEINE PHOSPHATE STA EACH: 300; 30 TABLET ORAL at 04:55

## 2024-06-12 RX ADMIN — CEFAZOLIN STA MLS/HR: 330 INJECTION, POWDER, FOR SOLUTION INTRAMUSCULAR; INTRAVENOUS at 02:37

## 2024-06-12 RX ADMIN — HYDROMORPHONE HYDROCHLORIDE STA MG: 1 INJECTION, SOLUTION INTRAMUSCULAR; INTRAVENOUS; SUBCUTANEOUS at 04:54

## 2024-06-12 RX ADMIN — MORPHINE SULFATE STA MG: 4 INJECTION, SOLUTION INTRAMUSCULAR; INTRAVENOUS at 02:38

## 2024-06-12 RX ADMIN — HYDROMORPHONE HYDROCHLORIDE STA MG: 1 INJECTION, SOLUTION INTRAMUSCULAR; INTRAVENOUS; SUBCUTANEOUS at 03:00

## 2024-06-12 RX ADMIN — PROPOFOL ONE MG: 10 INJECTION, EMULSION INTRAVENOUS at 02:50

## 2024-06-12 NOTE — XR
EXAM:

  XR Right Shoulder Complete, 2 or More Views

 

CLINICAL HISTORY:

  ITS.REASON XR Reason: shoulder dislocation

 

TECHNIQUE:

  Two or more views of the right shoulder.

 

COMPARISON:

  June 12, 2024.

 

FINDINGS:

  Bones/joints:  RIGHT shoulder arthroplasty with 3 screws traversing the 

glenoid.  Correlate with surgical history.  Overall, similar when 

compared to radiographs from June 12, 2024.  No acute fracture.  No 

dislocation.

  Soft tissues:  Unremarkable.

 

IMPRESSION:     

Postoperative changes.

## 2024-06-12 NOTE — XR
EXAM:

  XR Right Shoulder Complete, 2 or More Views

 

CLINICAL HISTORY:

   XR Reason: relocation

 

TECHNIQUE:

  Two or more views of the right shoulder.

 

COMPARISON:

  June 12, 2024 at 0206 hrs. and May 22, 2024

 

FINDINGS:

  Bones/joints:  Previous humeral head arthroplasty.  There are several 

screws in the glenoid.  The interface between the humeral head and 

glenoid rim is obscured.  The distal clavicles intact and normally 

aligned with respect to the acromion.  No acute fracture.  No dislocation.

 

  Soft tissues:  Unremarkable.

 

IMPRESSION:     

  Previous humeral head arthroplasty.  There are several screws in the 

glenoid.  The interface between the humeral head and glenoid rim is 

obscured.  The positioning appears similar to May 22, 2024 which was not 

considered dislocated.

## 2024-06-12 NOTE — ED
General Adult HPI





- General


Chief complaint: Extremity Injury, Upper


Stated complaint: R shoulder disloacted


Time Seen by Provider: 24 01:49


Source: patient


Mode of arrival: ambulatory


Limitations: no limitations





- History of Present Illness


Initial comments: 


Dictation was produced using dragon dictation software. please excuse any 

grammatical, word or spelling errors. 











Chief Complaint: 44-year-old male presents to the ER for right shoulder 

dislocation





History of Present Illness: Patient is a 44-year-old male he has past medical 

history of right shoulder surgery.  He has had multiple dislocations in the 

past.  States that his right shoulder was dislocated posteriorly.  He was moving

some things for his significant other when he felt it pop out of place.  Patient

has had relocations performed on multiple occasions.  Patient states that when 

this happens he is unable to bend at his elbow.  States that his surgeon has 

moved out of state 17 years ago.  He has not followed up with the [facility] yet.








The ROS documented in this emergency department record has been reviewed and 

confirmed by me.  Those systems with pertinent positive or negative responses 

have been documented in the HPI.  All other systems are other negative and/or 

noncontributory.




















- Related Data


                                Home Medications











 Medication  Instructions  Recorded  Confirmed


 


traZODone  mg PO HS 08/04/22 04/10/24


 


Zolpidem [Ambien] 10 mg PO HS PRN 04/10/24 04/10/24


 


diphenhydrAMINE [Benadryl] 25 mg PO HS 04/10/24 04/10/24








                                  Previous Rx's











 Medication  Instructions  Recorded


 


Pantoprazole [Protonix] 40 mg PO DAILY #30 tab 24











                                    Allergies











Allergy/AdvReac Type Severity Reaction Status Date / Time


 


latex Allergy  Rash/Hives Verified 24 02:38


 


meperidine [From Demerol] Allergy  Rash/Hives Verified 24 02:38














Review of Systems


ROS Statement: 


Those systems with pertinent positive or pertinent negative responses have been 

documented in the HPI.





ROS Other: All systems not noted in ROS Statement are negative.





Past Medical History


Past Medical History: Atrial Fibrillation, GERD/Reflux, Hypertension


Additional Past Medical History / Comment(s): Pt states difficulty with 

abdominal pain/nausea and vomiting when eating since surgery for hiatal 

hernia/peg tube insertion.States afib during his  hospitalization for carola en 

Y/peg tube surgery, R shoulder dislocations, pt states recent EKG at Dr. Marvin's

office showed MI at some time, chronic low back pain. Removal of Peg Tube. 

States having dizziness. See Dr. Raygoza's H&P.  infection in stomach


History of Any Multi-Drug Resistant Organisms: None Reported


Past Surgical History: Appendectomy, Back Surgery, Cholecystectomy, Hernia 

Repair, Orthopedic Surgery, Tonsillectomy


Additional Past Surgical History / Comment(s): Lysis of abdominal adhesions then

Carola en Y, peg tube insertion and removal, EGD, diaphragmatic hernia repair, 

lumbar fusion, R shoulder arthroscopy, L shoulder fusion. States his hernia was 

attached to his stomach wall and this is  why all the surgery. See Dr. Raygoza's

H&P.


Past Anesthesia/Blood Transfusion Reactions: No Reported Reaction


Past Psychological History: No Psychological Hx Reported


Smoking Status: Never smoker


Past Alcohol Use History: None Reported


Past Drug Use History: None Reported





- Past Family History


  ** Mother


Additional Family Medical History / Comment(s): Mother is , she had 

lupus.





  ** Father


History Unknown: Yes





  ** Brother(s)


Additional Family Medical History / Comment(s): autoimmune disorder





General Exam





- General Exam Comments


Initial Comments: 











General: Acute distress secondary to pain


Head: Normocephalic, atraumatic


Eyes: PERRLA, EOMI


ENT: Airway patent


Chest: Nonlabored breathing


Skin: No visual rash, normal skin tone


Neuro: Alert and oriented 3


Musculoskeletal: No gross abnormalities


Right shoulder: Decreased passive and active range of motion at the right 

shoulder and right elbow, no obvious deformities





Limitations: no limitations





Course


                                   Vital Signs











  24





  01:44 02:49 02:55


 


Temperature 98.2 F  


 


Pulse Rate 85 75 80


 


Respiratory 18 16 18





Rate   


 


Blood Pressure 141/98 114/87 126/87


 


O2 Sat by Pulse 98 100 100





Oximetry   














  24





  02:56 03:11 03:26


 


Temperature   


 


Pulse Rate 76 79 73


 


Respiratory 18 16 18





Rate   


 


Blood Pressure 130/92 113/72 133/82


 


O2 Sat by Pulse 100 100 100





Oximetry   














  24





  03:41 03:56 04:26


 


Temperature   


 


Pulse Rate 73 87 70


 


Respiratory 16 16 18





Rate   


 


Blood Pressure 123/81 128/82 132/72


 


O2 Sat by Pulse 100 100 100





Oximetry   














Procedures





- Orthopedic Joint Reduction


  ** Joint #1


Consent Obtained: verbal consent, written consent, emergent situation


Side: right


Joint Reduction Location: shoulder


Analgesia: procedural sedation


Shoulder Technique Used (if applicable): traction/counter-traction


Technique Used: traction/counter-traction


Post-Reduction Neuro Exam: intact


Post-Reduction Vascular Exam: intact


Post Reduction X-Ray Obtained: Yes


Post Reduction X-Ray Results: reduced


Splint Applied: Yes


Patient Tolerated Procedure: well





- Procedural Sedation


*Procedural Sedation Start Time: 02:50


*Procedural Sedation Stop Time: 02:56


*Risks,benefits, and alternative therapies discussed?: Yes


*Patient indicates understanding of risk/benefit discussion?: Yes


*Indications: fracture/dislocation reduction


*Previous Adverse Reaction to Anesthesia/Sedation?: No


*ASA Class: I


Preparation: cardiac monitor applied, pulse oximeter, capnometry used, 

supplemental O2 applied


IV Propofol Dose (mgs): 100


Complications: none


Patient Tolerated Procedure: well





Medical Decision Making





- Medical Decision Making





Was pt. sent in by a medical professional or institution (Dr. PA, NP, urgent 

care, hospital, or nursing home...) When possible be specific


@  -No


Did you speak to anyone other than the patient for history (EMS, parent, family,

police, friend...)? What history was obtained from this source 


@  -No


Did you review nursing and triage notes (agree or disagree)?  Why? 


@  -I reviewed and agree with nursing and triage notes


Were old charts reviewed (outside hosp., previous admission, EMS record, old 

EKG, old radiological studies, urgent care reports/EKG's, nursing home records)?

Report findings 


@  -No old charts were reviewed


Differential Diagnosis (chest pain, altered mental status, abdominal pain women,

abdominal pain men, vaginal bleeding, musculoskeletal, weakness, fever, dyspnea,

syncope, headache, dizziness, GI bleed, back pain, seizure, CVA, palpatations, 

mental health)? 


@  -Shoulder dislocation, shoulder fracture, shoulder sprain


EKG interpreted by me (3pts min.).


@  -None done


X-rays interpreted by me (1pt min.).


@  -X-rays shows still in alignment to previous shoulder x-rays.  There is no 

fractures or any other acute other acute processes.


CT interpreted by me (1pt min.).


@  -None done


U/S interpreted by me (1pt. min.).


@  -None done


What testing was considered but not performed or refused? (CT, X-rays, U/S, 

labs)? Why?


@  -None


What meds were considered but not given or refused? Why?


@  -None


Did you discuss the management of the patient with other professionals 

(professionals i.e. Dr., PA, NP, lab, RT, psych nurse, , , 

teacher, , )? Give summary


@  -No


Was smoking cessation discussed for >3mins.?


@  -No


Was critical care preformed (if so, how long)?


@  -No


Were there social determinants of health that impacted care today? How? 

(Homelessness, low income, unemployed, alcoholism, drug addiction, transpo

rtation, low edu. Level, literacy, decrease access to med. care, [facility], rehab)?


@  -No


Was there de-escalation of care discussed even if they declined (Discuss DNR or 

withdrawal of care, Hospice)? DNR status


@  -No


What co-morbidities impacted this encounter? (DM, HTN, Smoking, COPD, CAD, 

Cancer, CVA, ARF, Chemo, Hep., AIDS, mental health diagnosis, sleep apnea, 

morbid obesity)?


@  -None


Was patient admitted / discharged? Hospital course, mention meds given and 

route, prescriptions, significant lab abnormalities, going to OR and other 

pertinent info.


@  -44-year-old male with history of humeral head shoulder surgery performed 

several years ago presents to the ER with concerns of shoulder dislocation.  

Vital signs upon arrival are within acceptable limits.  Procedural sedation was 

performed.  Arm was manipulated to movement which felt rather smooth.  There is 

no clunking.  Patient's arm was relocated to a more anatomical/neutral position.

 He woke complaining of persistent pain.  Patient given more analgesics with 

improvement of symptoms.  At this point patient cleared for discharge he is 

advised to follow-up with [facility] like he was initially instructed.  

Chart was reviewed patient had been in the emergency department multiple 

occasions for this.  There is some concern that perhaps patient is exhibiting 

drug-seeking behavior.


Undiagnosed new problem with uncertain prognosis?


@  -No


Drug Therapy requiring intensive monitoring for toxicity (Heparin, Nitro, 

Insulin, Cardizem)?


@  -No


Were any procedures done?


@  -See above


Diagnosis/symptom?  Acute, or Chronic, or Acute on Chronic?  Uncomplicated 

(without systemic symptoms) or Complicated (systemic symptoms)?


@  -Shoulder pain, no obvious source


Side effects of treatment?


@  -No


Exacerbation, Progression, or Severe Exacerbation?


@  -No


Poses a threat to life or bodily function? How? (Chest pain, USA, MI, pneumonia,

PE, COPD, DKA, ARF, appy, cholecystitis, CVA, Diverticulitis, Homicidal, 

Suicidal, threat to staff... and all critical care pts)


@  -yes








Disposition


Clinical Impression: 


 Shoulder pain





Disposition: HOME SELF-CARE


Condition: Fair


Instructions (If sedation given, give patient instructions):  Shoulder Pain (ED)


Is patient prescribed a controlled substance at d/c from ED?: No


Referrals: 


Tyra Marvin MD [Primary Care Provider] - 1-2 days


Time of Disposition: 04:42

## 2024-08-07 ENCOUNTER — HOSPITAL ENCOUNTER (EMERGENCY)
Dept: HOSPITAL 47 - EC | Age: 44
Discharge: HOME | End: 2024-08-07
Payer: COMMERCIAL

## 2024-08-07 PROCEDURE — 96361 HYDRATE IV INFUSION ADD-ON: CPT

## 2024-08-07 PROCEDURE — 23650 CLTX SHO DSLC W/MNPJ WO ANES: CPT

## 2024-08-07 PROCEDURE — 96374 THER/PROPH/DIAG INJ IV PUSH: CPT

## 2024-08-07 PROCEDURE — 96375 TX/PRO/DX INJ NEW DRUG ADDON: CPT

## 2024-08-07 PROCEDURE — 99283 EMERGENCY DEPT VISIT LOW MDM: CPT

## 2024-08-29 NOTE — XR
EXAMINATION TYPE: XR shoulder limited RT

 

DATE OF EXAM: 8/29/2024 12:20 PM

 

INDICATION: 

Patient age:Male;  44 years old; 

Reason for study: POST REDUCTION;

 

COMPARISON: Right shoulder radiograph the same day.

 

TECHNIQUE: The right shoulder  was examined in single AP projection.

 

FINDINGS: 

Right shoulder arthroplasty changes with 3 screws traversing the glenoid. No acute fracture. Postredu
ction AC joint is maintained. Visualized ribs are intact. 

 

IMPRESSION: 

Post reduction right shoulder arthroplasty.

## 2024-09-05 NOTE — XR
Patient: Gunner Hoffmann J Ordering Physician: Unknown, Unknown ID: [id number] Phone, Pager: Phone: N
/A Pager: N/A : 1980 Age/Gender: 44Y, M Primary Location: N/A Procedure: XR shoulder complete
 RT Study Date: 2024 5:50:23 AM Accession #: [id number]

 

EXAMINATION TYPE: XR shoulder complete RT

 

DATE OF EXAM: 2024 10:47 AM

 

CLINICAL INDICATION: Pain 

 

COMPARISON: None

 

TECHNIQUE: XR shoulder complete RT; examined in AP, internally rotated and scapular Y projections. 

 

FINDINGS: Arthroplasty changes hardware appears intact. No evidence of acute osseous pathology, joint
 dislocation, or soft tissue swelling.  The remaining portions of the visualized chest are unremarkab
le.

 

IMPRESSION: 

Post arthroplasty changes without evidence of fracture.